# Patient Record
Sex: MALE | Race: WHITE | NOT HISPANIC OR LATINO | URBAN - METROPOLITAN AREA
[De-identification: names, ages, dates, MRNs, and addresses within clinical notes are randomized per-mention and may not be internally consistent; named-entity substitution may affect disease eponyms.]

---

## 2018-11-04 ENCOUNTER — INPATIENT (INPATIENT)
Facility: HOSPITAL | Age: 83
LOS: 0 days | Discharge: HOME | DRG: 312 | End: 2018-11-05
Attending: HOSPITALIST | Admitting: INTERNAL MEDICINE
Payer: COMMERCIAL

## 2018-11-04 VITALS
SYSTOLIC BLOOD PRESSURE: 174 MMHG | RESPIRATION RATE: 18 BRPM | OXYGEN SATURATION: 96 % | TEMPERATURE: 97 F | DIASTOLIC BLOOD PRESSURE: 82 MMHG | WEIGHT: 175.05 LBS | HEART RATE: 67 BPM | HEIGHT: 69 IN

## 2018-11-04 DIAGNOSIS — Z95.1 PRESENCE OF AORTOCORONARY BYPASS GRAFT: Chronic | ICD-10-CM

## 2018-11-04 DIAGNOSIS — R55 SYNCOPE AND COLLAPSE: ICD-10-CM

## 2018-11-04 DIAGNOSIS — E03.9 HYPOTHYROIDISM, UNSPECIFIED: ICD-10-CM

## 2018-11-04 DIAGNOSIS — Z90.89 ACQUIRED ABSENCE OF OTHER ORGANS: Chronic | ICD-10-CM

## 2018-11-04 DIAGNOSIS — E11.9 TYPE 2 DIABETES MELLITUS WITHOUT COMPLICATIONS: ICD-10-CM

## 2018-11-04 DIAGNOSIS — I10 ESSENTIAL (PRIMARY) HYPERTENSION: ICD-10-CM

## 2018-11-04 DIAGNOSIS — I25.10 ATHEROSCLEROTIC HEART DISEASE OF NATIVE CORONARY ARTERY WITHOUT ANGINA PECTORIS: ICD-10-CM

## 2018-11-04 DIAGNOSIS — E78.00 PURE HYPERCHOLESTEROLEMIA, UNSPECIFIED: ICD-10-CM

## 2018-11-04 LAB
ALBUMIN SERPL ELPH-MCNC: 3.6 G/DL — SIGNIFICANT CHANGE UP (ref 3.3–5)
ALP SERPL-CCNC: 81 U/L — SIGNIFICANT CHANGE UP (ref 40–120)
ALT FLD-CCNC: 40 U/L DA — SIGNIFICANT CHANGE UP (ref 10–45)
ANION GAP SERPL CALC-SCNC: 5 MMOL/L — SIGNIFICANT CHANGE UP (ref 5–17)
APTT BLD: 29.8 SEC — SIGNIFICANT CHANGE UP (ref 27.5–36.3)
AST SERPL-CCNC: 32 U/L — SIGNIFICANT CHANGE UP (ref 10–40)
BASOPHILS # BLD AUTO: 0 K/UL — SIGNIFICANT CHANGE UP (ref 0–0.2)
BASOPHILS NFR BLD AUTO: 0.5 % — SIGNIFICANT CHANGE UP (ref 0–2)
BILIRUB SERPL-MCNC: 0.4 MG/DL — SIGNIFICANT CHANGE UP (ref 0.2–1.2)
BUN SERPL-MCNC: 31 MG/DL — HIGH (ref 7–23)
CALCIUM SERPL-MCNC: 9.3 MG/DL — SIGNIFICANT CHANGE UP (ref 8.4–10.5)
CHLORIDE SERPL-SCNC: 106 MMOL/L — SIGNIFICANT CHANGE UP (ref 96–108)
CK MB BLD-MCNC: 2.2 % — SIGNIFICANT CHANGE UP (ref 0–3.5)
CK MB CFR SERPL CALC: 2.5 NG/ML — SIGNIFICANT CHANGE UP (ref 0.5–10)
CK SERPL-CCNC: 115 U/L — SIGNIFICANT CHANGE UP (ref 30–200)
CO2 SERPL-SCNC: 31 MMOL/L — SIGNIFICANT CHANGE UP (ref 22–31)
CREAT SERPL-MCNC: 0.89 MG/DL — SIGNIFICANT CHANGE UP (ref 0.5–1.3)
EOSINOPHIL # BLD AUTO: 0.2 K/UL — SIGNIFICANT CHANGE UP (ref 0–0.5)
EOSINOPHIL NFR BLD AUTO: 2.7 % — SIGNIFICANT CHANGE UP (ref 0–6)
GLUCOSE SERPL-MCNC: 112 MG/DL — HIGH (ref 70–99)
HCT VFR BLD CALC: 49.7 % — SIGNIFICANT CHANGE UP (ref 39–50)
HGB BLD-MCNC: 16.4 G/DL — SIGNIFICANT CHANGE UP (ref 13–17)
INR BLD: 1.13 RATIO — SIGNIFICANT CHANGE UP (ref 0.88–1.16)
LYMPHOCYTES # BLD AUTO: 1.7 K/UL — SIGNIFICANT CHANGE UP (ref 1–3.3)
LYMPHOCYTES # BLD AUTO: 18.5 % — SIGNIFICANT CHANGE UP (ref 13–44)
MCHC RBC-ENTMCNC: 31.6 PG — SIGNIFICANT CHANGE UP (ref 27–34)
MCHC RBC-ENTMCNC: 32.9 GM/DL — SIGNIFICANT CHANGE UP (ref 32–36)
MCV RBC AUTO: 96 FL — SIGNIFICANT CHANGE UP (ref 80–100)
MONOCYTES # BLD AUTO: 0.9 K/UL — SIGNIFICANT CHANGE UP (ref 0–0.9)
MONOCYTES NFR BLD AUTO: 9.5 % — HIGH (ref 1–9)
NEUTROPHILS # BLD AUTO: 6.1 K/UL — SIGNIFICANT CHANGE UP (ref 1.8–7.4)
NEUTROPHILS NFR BLD AUTO: 68.6 % — SIGNIFICANT CHANGE UP (ref 43–77)
PLATELET # BLD AUTO: 128 K/UL — LOW (ref 150–400)
POTASSIUM SERPL-MCNC: 5 MMOL/L — SIGNIFICANT CHANGE UP (ref 3.5–5.3)
POTASSIUM SERPL-SCNC: 5 MMOL/L — SIGNIFICANT CHANGE UP (ref 3.5–5.3)
PROT SERPL-MCNC: 7.2 G/DL — SIGNIFICANT CHANGE UP (ref 6–8.3)
PROTHROM AB SERPL-ACNC: 12.7 SEC — SIGNIFICANT CHANGE UP (ref 10–12.9)
RBC # BLD: 5.18 M/UL — SIGNIFICANT CHANGE UP (ref 4.2–5.8)
RBC # FLD: 12.1 % — SIGNIFICANT CHANGE UP (ref 10.3–14.5)
SODIUM SERPL-SCNC: 142 MMOL/L — SIGNIFICANT CHANGE UP (ref 135–145)
TROPONIN I SERPL-MCNC: 0.02 NG/ML — SIGNIFICANT CHANGE UP (ref 0.02–0.06)
TROPONIN I SERPL-MCNC: 0.03 NG/ML — SIGNIFICANT CHANGE UP (ref 0.02–0.06)
WBC # BLD: 8.9 K/UL — SIGNIFICANT CHANGE UP (ref 3.8–10.5)
WBC # FLD AUTO: 8.9 K/UL — SIGNIFICANT CHANGE UP (ref 3.8–10.5)

## 2018-11-04 PROCEDURE — 71045 X-RAY EXAM CHEST 1 VIEW: CPT | Mod: 26

## 2018-11-04 PROCEDURE — 70450 CT HEAD/BRAIN W/O DYE: CPT | Mod: 26

## 2018-11-04 PROCEDURE — 99223 1ST HOSP IP/OBS HIGH 75: CPT

## 2018-11-04 PROCEDURE — 93010 ELECTROCARDIOGRAM REPORT: CPT

## 2018-11-04 PROCEDURE — 71275 CT ANGIOGRAPHY CHEST: CPT | Mod: 26

## 2018-11-04 PROCEDURE — 99285 EMERGENCY DEPT VISIT HI MDM: CPT

## 2018-11-04 PROCEDURE — 72125 CT NECK SPINE W/O DYE: CPT | Mod: 26

## 2018-11-04 RX ORDER — ACETAMINOPHEN 500 MG
650 TABLET ORAL ONCE
Qty: 0 | Refills: 0 | Status: COMPLETED | OUTPATIENT
Start: 2018-11-04 | End: 2018-11-04

## 2018-11-04 RX ORDER — ENOXAPARIN SODIUM 100 MG/ML
40 INJECTION SUBCUTANEOUS EVERY 24 HOURS
Qty: 0 | Refills: 0 | Status: DISCONTINUED | OUTPATIENT
Start: 2018-11-04 | End: 2018-11-05

## 2018-11-04 RX ORDER — METOPROLOL TARTRATE 50 MG
25 TABLET ORAL DAILY
Qty: 0 | Refills: 0 | Status: DISCONTINUED | OUTPATIENT
Start: 2018-11-04 | End: 2018-11-05

## 2018-11-04 RX ORDER — ASPIRIN/CALCIUM CARB/MAGNESIUM 324 MG
81 TABLET ORAL DAILY
Qty: 0 | Refills: 0 | Status: DISCONTINUED | OUTPATIENT
Start: 2018-11-04 | End: 2018-11-05

## 2018-11-04 RX ORDER — METOPROLOL TARTRATE 50 MG
1 TABLET ORAL
Qty: 0 | Refills: 0 | COMMUNITY

## 2018-11-04 RX ORDER — LEVOTHYROXINE SODIUM 125 MCG
100 TABLET ORAL DAILY
Qty: 0 | Refills: 0 | Status: DISCONTINUED | OUTPATIENT
Start: 2018-11-04 | End: 2018-11-05

## 2018-11-04 RX ORDER — ATORVASTATIN CALCIUM 80 MG/1
10 TABLET, FILM COATED ORAL AT BEDTIME
Qty: 0 | Refills: 0 | Status: DISCONTINUED | OUTPATIENT
Start: 2018-11-04 | End: 2018-11-05

## 2018-11-04 RX ORDER — SIMVASTATIN 20 MG/1
20 TABLET, FILM COATED ORAL AT BEDTIME
Qty: 0 | Refills: 0 | Status: DISCONTINUED | OUTPATIENT
Start: 2018-11-04 | End: 2018-11-04

## 2018-11-04 RX ADMIN — Medication 650 MILLIGRAM(S): at 21:48

## 2018-11-04 RX ADMIN — Medication 650 MILLIGRAM(S): at 20:48

## 2018-11-04 NOTE — H&P ADULT - HISTORY OF PRESENT ILLNESS
83M hx of CAD, s/p CABG x5 (2006), HTN, HLD, hypothyroid, borderline DM (not req meds), diffuse OA (neck, hands, hips, knees) pw s/p syncope and collapse. Pt recalled that he was outside trying to go up steps but was short before the steps and turned into a bush and the next thing he knew he was being transported to the ambulance. He denied any antecedent HA, SOB, palps, CP, diaphoresis, nausea. Syncope was unwitnessed. Wife heard him fall and found him on the ground unconscious and came to in several minutes and incoherent initially. Currently, he has some back pain s/p fall. Has chronic neck pain from OA. No recent fevers, chills, URI sxs, NVD or dysuria.

## 2018-11-04 NOTE — H&P ADULT - PROBLEM SELECTOR PLAN 1
abnormal CXR r/o PNA/PE  tele monitoring to assess for arrthymias, bradycardia  trend trops, ECHO, card  CThead/neck: prelim report neg for CVA/ICH or fx  check UA to exclude occult UTI

## 2018-11-04 NOTE — H&P ADULT - NSHPPHYSICALEXAM_GEN_ALL_CORE
Vital Signs Last 24 Hrs  T(C): 36.2 (04 Nov 2018 18:20), Max: 36.2 (04 Nov 2018 18:20)  T(F): 97.1 (04 Nov 2018 18:20), Max: 97.1 (04 Nov 2018 18:20)  HR: 59 (04 Nov 2018 21:23) (59 - 67)  BP: 123/53 (04 Nov 2018 21:23) (123/53 - 174/82)  BP(mean): --  RR: 18 (04 Nov 2018 21:23) (18 - 18)  SpO2: 97% (04 Nov 2018 21:23) (96% - 97%)  Daily Height in cm: 175.26 (04 Nov 2018 18:20)    Daily   CAPILLARY BLOOD GLUCOSE        I&O's Summary      GENERAL: NAD  HEAD:  Normocephalic  EYES: EOMI, PERRLA, conjunctiva and sclera clear  ENMT: No tonsillar erythema, exudates, or enlargement; Moist mucous membranes, No lesions  NECK: Supple, No JVD, no bruit, normal thyroid  NERVOUS SYSTEM:  Alert & Oriented X3, Good concentration; grossly  Motor Strength 5/5 B/L upper and lower extremities; DTRs 2+ intact and symmetric  CHEST/LUNG: Clear to auscultation bilaterally; No rales, rhonchi, wheezing, or rubs  HEART: Regular rate and rhythm; No murmurs, rubs, or gallops  ABDOMEN: Soft, Nontender, Nondistended; Bowel sounds present  EXTREMITIES:  2+ Peripheral Pulses, No clubbing, cyanosis, or edema  LYMPH: No lymphadenopathy noted  SKIN: No rashes or lesions

## 2018-11-04 NOTE — PATIENT PROFILE ADULT - NSPROGENPREVTRANSF_GEN_A_NUR
no
Spine appears normal - no midline c/t/ls tenderness, range of motion is not limited, no muscle or joint tenderness

## 2018-11-04 NOTE — H&P ADULT - ASSESSMENT
83M hx of CAD, s/p CABG x5 (2006), HTN, HLD, hypothyroid, borderline DM (not req meds), diffuse OA (neck, hands, hips, knees) pw s/p syncope and collapse.

## 2018-11-04 NOTE — H&P ADULT - PMH
Coronary artery disease involving native coronary artery of native heart, angina presence unspecified    High cholesterol    Hypothyroid

## 2018-11-04 NOTE — H&P ADULT - NSHPLABSRESULTS_GEN_ALL_CORE
16.4   8.9   )-----------( 128      ( 04 Nov 2018 19:50 )             49.7       11-04    142  |  106  |  31<H>  ----------------------------<  112<H>  5.0   |  31  |  0.89    Ca    9.3      04 Nov 2018 19:50    TPro  7.2  /  Alb  3.6  /  TBili  0.4  /  DBili  x   /  AST  32  /  ALT  40  /  AlkPhos  81  11-04         LIVER FUNCTIONS - ( 04 Nov 2018 19:50 )  Alb: 3.6 g/dL / Pro: 7.2 g/dL / ALK PHOS: 81 U/L / ALT: 40 U/L DA / AST: 32 U/L / GGT: x               PT/INR - ( 04 Nov 2018 19:50 )   PT: 12.7 sec;   INR: 1.13 ratio         PTT - ( 04 Nov 2018 19:50 )  PTT:29.8 sec    CARDIAC MARKERS ( 04 Nov 2018 19:50 )  .021 ng/mL / x     / 115 U/L / x     / 2.5 ng/mL            CAPILLARY BLOOD GLUCOSE            EKG: sinus rochelle at 53bpm, LAD, no acute st changes.       CXR: increased density on the LLL    CT Cervical spine: PRELIM report    IMPRESSION:   1. No acute fracture.   2. Multilevel degenerative changes as detailed above.     CT head: PRELIM report    IMPRESSION:   No evidence of acute intracranial hemorrhage, or edema.     CT angio of the chest: RESULTS PENDING

## 2018-11-04 NOTE — ED PROVIDER NOTE - OBJECTIVE STATEMENT
84 y/o M with pmhx of hypothyroid, high cholesterol, CAD, presenting to the ED BIBEMS with c/o falling down this evening with + LOC. Pt reports he was walking up his outside stairs when he felt he miss stepped causing him to fall. He does not remember falling and does not remember hitting the ground. He was found by his wife outside on his back awake but incoherent. En route to the hospital pt started regaining his memory however still does not remember falling. He admits to 3 out of 10 pain to the back on his head and his left scapula. He denies any further injury or pain. He denies feeling faint prior to falling, denies CP, SOB, ABD pain, weakness.

## 2018-11-04 NOTE — ED ADULT NURSE NOTE - OBJECTIVE STATEMENT
pt presents to ED s/p fall. reports tripped and fell hitting back of head. reports +loc.  abrasion  noted. pt alert and oriented, however takes several moments to recall. denies n/v, cp, sob. PA at bedside. no further complaints.

## 2018-11-04 NOTE — H&P ADULT - FAMILY HISTORY
Mother  Still living? Unknown  Family history of cerebrovascular accident (CVA), Age at diagnosis: Age Unknown     Sibling  Still living? Unknown  Family history of diabetes mellitus, Age at diagnosis: Age Unknown

## 2018-11-04 NOTE — H&P ADULT - NSHPREVIEWOFSYSTEMS_GEN_ALL_CORE
CONSTITUTIONAL: No fever, weight loss, or fatigue  EYES: No eye pain, visual disturbances, or discharge  ENMT:  No difficulty hearing, tinnitus, vertigo; No sinus or throat pain  NECK: No pain or stiffness  RESPIRATORY: No cough, wheezing, chills or hemoptysis; No shortness of breath  CARDIOVASCULAR: No chest pain, palpitations, dizziness, or leg swelling  GASTROINTESTINAL: No abdominal or epigastric pain. No nausea, vomiting, or hematemesis; No diarrhea or constipation. No melena or hematochezia.  GENITOURINARY: No dysuria, frequency, hematuria, or incontinence  NEUROLOGICAL: No headaches, memory loss, loss of strength, numbness, or tremors  SKIN: No itching, burning, rashes, or lesions   LYMPH NODES: No enlarged glands  ENDOCRINE: No heat or cold intolerance; No hair loss  MUSCULOSKELETAL: No joint pain or swelling; No muscle, back, or extremity pain  PSYCHIATRIC: No depression, anxiety, mood swings, or difficulty sleeping  HEME/LYMPH: No easy bruising, or bleeding gums  ALLERY AND IMMUNOLOGIC: No hives or eczema    IMPROVE VTE Individual Risk Assessment          RISK                                                          Points  [  ] Previous VTE                                                3  [  ] Thrombophilia                                             2  [  ] Lower limb paralysis                                   2        (unable to hold up >15 seconds)    [  ] Current Cancer                                             2         (within 6 months)  [  ] Immobilization > 24 hrs                              1  [  ] ICU/CCU stay > 24 hours                             1  [1  ] Age > 60                                                         1    IMPROVE VTE Score:         [     1    ]    Total Risk Factor Score:    0 - 1:   Consider IPC  >2 - 3:  Thromboprophylaxis required (enoxaparin or SQ heparin)        >4:   High Risk: Thromboprophylaxis required (enoxaparin or SQ heparin), optional add IPC  **If CONTRAINDICATION to enoxaparin or SQ heparin, USE IPCs**

## 2018-11-05 ENCOUNTER — TRANSCRIPTION ENCOUNTER (OUTPATIENT)
Age: 83
End: 2018-11-05

## 2018-11-05 VITALS
RESPIRATION RATE: 15 BRPM | SYSTOLIC BLOOD PRESSURE: 131 MMHG | TEMPERATURE: 99 F | HEART RATE: 60 BPM | DIASTOLIC BLOOD PRESSURE: 61 MMHG | OXYGEN SATURATION: 96 %

## 2018-11-05 LAB
ALBUMIN SERPL ELPH-MCNC: 3.1 G/DL — LOW (ref 3.3–5)
ALP SERPL-CCNC: 72 U/L — SIGNIFICANT CHANGE UP (ref 40–120)
ALT FLD-CCNC: 32 U/L DA — SIGNIFICANT CHANGE UP (ref 10–45)
ANION GAP SERPL CALC-SCNC: 8 MMOL/L — SIGNIFICANT CHANGE UP (ref 5–17)
APPEARANCE UR: CLEAR — SIGNIFICANT CHANGE UP
AST SERPL-CCNC: 26 U/L — SIGNIFICANT CHANGE UP (ref 10–40)
BACTERIA # UR AUTO: ABNORMAL /HPF
BILIRUB SERPL-MCNC: 0.7 MG/DL — SIGNIFICANT CHANGE UP (ref 0.2–1.2)
BILIRUB UR-MCNC: NEGATIVE — SIGNIFICANT CHANGE UP
BUN SERPL-MCNC: 22 MG/DL — SIGNIFICANT CHANGE UP (ref 7–23)
CALCIUM SERPL-MCNC: 8.9 MG/DL — SIGNIFICANT CHANGE UP (ref 8.4–10.5)
CHLORIDE SERPL-SCNC: 106 MMOL/L — SIGNIFICANT CHANGE UP (ref 96–108)
CHOLEST SERPL-MCNC: 133 MG/DL — SIGNIFICANT CHANGE UP (ref 10–199)
CO2 SERPL-SCNC: 27 MMOL/L — SIGNIFICANT CHANGE UP (ref 22–31)
COLOR SPEC: YELLOW — SIGNIFICANT CHANGE UP
CREAT SERPL-MCNC: 0.78 MG/DL — SIGNIFICANT CHANGE UP (ref 0.5–1.3)
DIFF PNL FLD: NEGATIVE — SIGNIFICANT CHANGE UP
EPI CELLS # UR: SIGNIFICANT CHANGE UP
GLUCOSE SERPL-MCNC: 93 MG/DL — SIGNIFICANT CHANGE UP (ref 70–99)
GLUCOSE UR QL: NEGATIVE — SIGNIFICANT CHANGE UP
HBA1C BLD-MCNC: 5.9 % — HIGH (ref 4–5.6)
HCT VFR BLD CALC: 46.2 % — SIGNIFICANT CHANGE UP (ref 39–50)
HDLC SERPL-MCNC: 36 MG/DL — LOW
HGB BLD-MCNC: 15 G/DL — SIGNIFICANT CHANGE UP (ref 13–17)
KETONES UR-MCNC: ABNORMAL
LEUKOCYTE ESTERASE UR-ACNC: NEGATIVE — SIGNIFICANT CHANGE UP
LIPID PNL WITH DIRECT LDL SERPL: 77 MG/DL — SIGNIFICANT CHANGE UP
MAGNESIUM SERPL-MCNC: 2 MG/DL — SIGNIFICANT CHANGE UP (ref 1.6–2.6)
MCHC RBC-ENTMCNC: 30.7 PG — SIGNIFICANT CHANGE UP (ref 27–34)
MCHC RBC-ENTMCNC: 32.4 GM/DL — SIGNIFICANT CHANGE UP (ref 32–36)
MCV RBC AUTO: 94.9 FL — SIGNIFICANT CHANGE UP (ref 80–100)
NITRITE UR-MCNC: NEGATIVE — SIGNIFICANT CHANGE UP
PH UR: 6 — SIGNIFICANT CHANGE UP (ref 5–8)
PLATELET # BLD AUTO: 113 K/UL — LOW (ref 150–400)
POTASSIUM SERPL-MCNC: 4 MMOL/L — SIGNIFICANT CHANGE UP (ref 3.5–5.3)
POTASSIUM SERPL-SCNC: 4 MMOL/L — SIGNIFICANT CHANGE UP (ref 3.5–5.3)
PROT SERPL-MCNC: 6.2 G/DL — SIGNIFICANT CHANGE UP (ref 6–8.3)
PROT UR-MCNC: 30 MG/DL
RBC # BLD: 4.87 M/UL — SIGNIFICANT CHANGE UP (ref 4.2–5.8)
RBC # FLD: 11.8 % — SIGNIFICANT CHANGE UP (ref 10.3–14.5)
RBC CASTS # UR COMP ASSIST: SIGNIFICANT CHANGE UP /HPF (ref 0–4)
SODIUM SERPL-SCNC: 141 MMOL/L — SIGNIFICANT CHANGE UP (ref 135–145)
SP GR SPEC: 1.01 — SIGNIFICANT CHANGE UP (ref 1.01–1.02)
TOTAL CHOLESTEROL/HDL RATIO MEASUREMENT: 3.7 RATIO — SIGNIFICANT CHANGE UP (ref 3.4–9.6)
TRIGL SERPL-MCNC: 102 MG/DL — SIGNIFICANT CHANGE UP (ref 10–149)
TROPONIN I SERPL-MCNC: <.017 NG/ML — LOW (ref 0.02–0.06)
TSH SERPL-MCNC: 1.45 UIU/ML — SIGNIFICANT CHANGE UP (ref 0.27–4.2)
UROBILINOGEN FLD QL: NEGATIVE — SIGNIFICANT CHANGE UP
WBC # BLD: 6.7 K/UL — SIGNIFICANT CHANGE UP (ref 3.8–10.5)
WBC # FLD AUTO: 6.7 K/UL — SIGNIFICANT CHANGE UP (ref 3.8–10.5)
WBC UR QL: SIGNIFICANT CHANGE UP /HPF (ref 0–5)

## 2018-11-05 PROCEDURE — 93306 TTE W/DOPPLER COMPLETE: CPT | Mod: 26

## 2018-11-05 PROCEDURE — 93306 TTE W/DOPPLER COMPLETE: CPT

## 2018-11-05 PROCEDURE — 72125 CT NECK SPINE W/O DYE: CPT

## 2018-11-05 PROCEDURE — 81001 URINALYSIS AUTO W/SCOPE: CPT

## 2018-11-05 PROCEDURE — 83036 HEMOGLOBIN GLYCOSYLATED A1C: CPT

## 2018-11-05 PROCEDURE — 99239 HOSP IP/OBS DSCHRG MGMT >30: CPT

## 2018-11-05 PROCEDURE — 99222 1ST HOSP IP/OBS MODERATE 55: CPT

## 2018-11-05 PROCEDURE — 84484 ASSAY OF TROPONIN QUANT: CPT

## 2018-11-05 PROCEDURE — 36000 PLACE NEEDLE IN VEIN: CPT

## 2018-11-05 PROCEDURE — 80061 LIPID PANEL: CPT

## 2018-11-05 PROCEDURE — 70450 CT HEAD/BRAIN W/O DYE: CPT

## 2018-11-05 PROCEDURE — 80053 COMPREHEN METABOLIC PANEL: CPT

## 2018-11-05 PROCEDURE — 71275 CT ANGIOGRAPHY CHEST: CPT

## 2018-11-05 PROCEDURE — 82550 ASSAY OF CK (CPK): CPT

## 2018-11-05 PROCEDURE — 84443 ASSAY THYROID STIM HORMONE: CPT

## 2018-11-05 PROCEDURE — 85730 THROMBOPLASTIN TIME PARTIAL: CPT

## 2018-11-05 PROCEDURE — 71045 X-RAY EXAM CHEST 1 VIEW: CPT

## 2018-11-05 PROCEDURE — 85027 COMPLETE CBC AUTOMATED: CPT

## 2018-11-05 PROCEDURE — 93005 ELECTROCARDIOGRAM TRACING: CPT

## 2018-11-05 PROCEDURE — 83735 ASSAY OF MAGNESIUM: CPT

## 2018-11-05 PROCEDURE — 93010 ELECTROCARDIOGRAM REPORT: CPT

## 2018-11-05 PROCEDURE — 82553 CREATINE MB FRACTION: CPT

## 2018-11-05 PROCEDURE — 99285 EMERGENCY DEPT VISIT HI MDM: CPT | Mod: 25

## 2018-11-05 PROCEDURE — 85610 PROTHROMBIN TIME: CPT

## 2018-11-05 RX ORDER — ACETAMINOPHEN 500 MG
650 TABLET ORAL EVERY 6 HOURS
Qty: 0 | Refills: 0 | Status: DISCONTINUED | OUTPATIENT
Start: 2018-11-05 | End: 2018-11-05

## 2018-11-05 RX ADMIN — Medication 100 MICROGRAM(S): at 05:28

## 2018-11-05 RX ADMIN — Medication 650 MILLIGRAM(S): at 15:06

## 2018-11-05 RX ADMIN — Medication 650 MILLIGRAM(S): at 05:09

## 2018-11-05 RX ADMIN — Medication 25 MILLIGRAM(S): at 05:28

## 2018-11-05 RX ADMIN — Medication 650 MILLIGRAM(S): at 16:47

## 2018-11-05 RX ADMIN — Medication 650 MILLIGRAM(S): at 04:09

## 2018-11-05 RX ADMIN — Medication 81 MILLIGRAM(S): at 12:41

## 2018-11-05 NOTE — PROGRESS NOTE ADULT - PROBLEM SELECTOR PROBLEM 2
Type 2 diabetes mellitus without complication, without long-term current use of insulin Coronary artery disease involving native coronary artery of native heart, angina presence unspecified

## 2018-11-05 NOTE — CONSULT NOTE ADULT - ASSESSMENT
In summary the patient is an 83 yr old man with cad. it is unclear whether he syncopized or simply tripped and fell.    No evidence of ACS and no arrythmia so far

## 2018-11-05 NOTE — DISCHARGE NOTE ADULT - CARE PROVIDER_API CALL
Jani Major), Cardiovascular Disease; Internal Medicine  [address] [place] [address]  Phone: [phone]  Fax: [phone]

## 2018-11-05 NOTE — PROGRESS NOTE ADULT - SUBJECTIVE AND OBJECTIVE BOX
Patient is a 83y old  Male who presents with a chief complaint of syncope (2018 23:07)      Patient seen and examined at bedside.    ALLERGIES:  No Known Allergies    MEDICATIONS  (STANDING):  aspirin enteric coated 81 milliGRAM(s) Oral daily  atorvastatin 10 milliGRAM(s) Oral at bedtime  enoxaparin Injectable 40 milliGRAM(s) SubCutaneous every 24 hours  levothyroxine 100 MICROGram(s) Oral daily  metoprolol succinate ER 25 milliGRAM(s) Oral daily    MEDICATIONS  (PRN):  acetaminophen   Tablet .. 650 milliGRAM(s) Oral every 6 hours PRN Temp greater or equal to 38C (100.4F), Mild Pain (1 - 3)    Vital Signs Last 24 Hrs  T(F): 98.4 (2018 10:28), Max: 98.4 (2018 10:28)  HR: 62 (2018 10:28) (59 - 74)  BP: 113/55 (2018 10:30) (110/46 - 174/82)  RR: 16 (2018 10:28) (15 - 18)  SpO2: 97% (2018 10:28) (94% - 98%)  I&O's Summary    2018 07:01  -  2018 11:23  --------------------------------------------------------  IN: 200 mL / OUT: 1 mL / NET: 199 mL      PHYSICAL EXAM:  General: NAD, A/O x 3  ENT: MMM  Neck: Supple, No JVD  Lungs: Clear to auscultation bilaterally  Cardio: RRR, S1/S2, No murmurs  Abdomen: Soft, Nontender, Nondistended; Bowel sounds present  Extremities: No calf tenderness, No pitting edema    LABS:                        15.0   6.7   )-----------( 113      ( 2018 07:28 )             46.2     11-05    141  |  106  |  22  ----------------------------<  93  4.0   |  27  |  0.78    Ca    8.9      2018 07:28  Mg     2.0     11-05    TPro  6.2  /  Alb  3.1  /  TBili  0.7  /  DBili  x   /  AST  26  /  ALT  32  /  AlkPhos  72      eGFR if Non African American: 84 mL/min/1.73M2 (18 @ 07:28)  eGFR if African American: 97 mL/min/1.73M2 (18 @ 07:28)    PT/INR - ( 2018 19:50 )   PT: 12.7 sec;   INR: 1.13 ratio         PTT - ( 2018 19:50 )  PTT:29.8 sec    CARDIAC MARKERS ( 2018 07:28 )  <.017 ng/mL / x     / x     / x     / x      CARDIAC MARKERS ( 2018 23:00 )  .030 ng/mL / x     / x     / x     / x      CARDIAC MARKERS ( 2018 19:50 )  .021 ng/mL / x     / 115 U/L / x     / 2.5 ng/mL    CAPILLARY BLOOD GLUCOSE          Urinalysis Basic - ( 2018 01:50 )    Color: Yellow / Appearance: Clear / S.010 / pH: x  Gluc: x / Ketone: Trace  / Bili: Negative / Urobili: Negative   Blood: x / Protein: 30 mg/dL / Nitrite: Negative   Leuk Esterase: Negative / RBC: 0-4 /HPF / WBC 0-2 /HPF   Sq Epi: x / Non Sq Epi: Neg.-Few / Bacteria: Trace /HPF          RADIOLOGY & ADDITIONAL TESTS:    Care Discussed with Consultants/Other Providers: Patient is a 83y old  Male who presents with a chief complaint of syncope (2018 23:07)  Patient seen and examined at bedside.  Pt. c/o headache and back pain.    ALLERGIES:  No Known Allergies    MEDICATIONS  (STANDING):  aspirin enteric coated 81 milliGRAM(s) Oral daily  atorvastatin 10 milliGRAM(s) Oral at bedtime  enoxaparin Injectable 40 milliGRAM(s) SubCutaneous every 24 hours  levothyroxine 100 MICROGram(s) Oral daily  metoprolol succinate ER 25 milliGRAM(s) Oral daily    MEDICATIONS  (PRN):  acetaminophen   Tablet .. 650 milliGRAM(s) Oral every 6 hours PRN Temp greater or equal to 38C (100.4F), Mild Pain (1 - 3)    Vital Signs Last 24 Hrs  T(F): 98.4 (2018 10:28), Max: 98.4 (2018 10:28)  HR: 62 (2018 10:28) (59 - 74)  BP: 113/55 (2018 10:30) (110/46 - 174/82)  RR: 16 (2018 10:28) (15 - 18)  SpO2: 97% (2018 10:28) (94% - 98%)  I&O's Summary    2018 07:01  -  2018 11:23  --------------------------------------------------------  IN: 200 mL / OUT: 1 mL / NET: 199 mL      PHYSICAL EXAM:  General: NAD, A/O x 3  HEENT:  +abrasion at back of head, no bleeding  Neck: Supple, No JVD  Lungs: Clear to auscultation bilaterally  Cardio: RRR, S1/S2, No murmurs  Abdomen: Soft, Nontender, Nondistended; Bowel sounds present  Extremities: No calf tenderness, No pitting edema  Neuro:  non focal, no gross deficits    LABS:                        15.0   6.7   )-----------( 113      ( 2018 07:28 )             46.2     11-05    141  |  106  |  22  ----------------------------<  93  4.0   |  27  |  0.78    Ca    8.9      2018 07:28  Mg     2.0         TPro  6.2  /  Alb  3.1  /  TBili  0.7  /  DBili  x   /  AST  26  /  ALT  32  /  AlkPhos  72      eGFR if Non African American: 84 mL/min/1.73M2 (18 @ 07:28)  eGFR if African American: 97 mL/min/1.73M2 (18 @ 07:28)    PT/INR - ( 2018 19:50 )   PT: 12.7 sec;   INR: 1.13 ratio         PTT - ( 2018 19:50 )  PTT:29.8 sec    CARDIAC MARKERS ( 2018 07:28 )  <.017 ng/mL / x     / x     / x     / x      CARDIAC MARKERS ( 2018 23:00 )  .030 ng/mL / x     / x     / x     / x      CARDIAC MARKERS ( 2018 19:50 )  .021 ng/mL / x     / 115 U/L / x     / 2.5 ng/mL    CAPILLARY BLOOD GLUCOSE          Urinalysis Basic - ( 2018 01:50 )    Color: Yellow / Appearance: Clear / S.010 / pH: x  Gluc: x / Ketone: Trace  / Bili: Negative / Urobili: Negative   Blood: x / Protein: 30 mg/dL / Nitrite: Negative   Leuk Esterase: Negative / RBC: 0-4 /HPF / WBC 0-2 /HPF   Sq Epi: x / Non Sq Epi: Neg.-Few / Bacteria: Trace /HPF          RADIOLOGY & ADDITIONAL TESTS:    Care Discussed with Consultants/Other Providers: Patient is a 83y old  Male who presents with a chief complaint of syncope (2018 23:07)  Patient seen and examined at bedside.  Pt. c/o headache and back pain.    ALLERGIES:  No Known Allergies    MEDICATIONS  (STANDING):  aspirin enteric coated 81 milliGRAM(s) Oral daily  atorvastatin 10 milliGRAM(s) Oral at bedtime  enoxaparin Injectable 40 milliGRAM(s) SubCutaneous every 24 hours  levothyroxine 100 MICROGram(s) Oral daily  metoprolol succinate ER 25 milliGRAM(s) Oral daily    MEDICATIONS  (PRN):  acetaminophen   Tablet .. 650 milliGRAM(s) Oral every 6 hours PRN Temp greater or equal to 38C (100.4F), Mild Pain (1 - 3)    Vital Signs Last 24 Hrs  T(F): 98.4 (2018 10:28), Max: 98.4 (2018 10:28)  HR: 62 (2018 10:28) (59 - 74)  BP: 113/55 (2018 10:30) (110/46 - 174/82)  RR: 16 (2018 10:28) (15 - 18)  SpO2: 97% (2018 10:28) (94% - 98%)  I&O's Summary    2018 07:01  -  2018 11:23  --------------------------------------------------------  IN: 200 mL / OUT: 1 mL / NET: 199 mL      PHYSICAL EXAM:  General: NAD, A/O x 3  HEENT:  +abrasion at back of head, no bleeding  Neck: Supple, No JVD  Lungs: Clear to auscultation bilaterally  Cardio: RRR, S1/S2, No murmurs  Abdomen: Soft, Nontender, Nondistended; Bowel sounds present  Extremities: No calf tenderness, No pitting edema  Neuro:  non focal, no gross deficits    LABS:                        15.0   6.7   )-----------( 113      ( 2018 07:28 )             46.2     11-05    141  |  106  |  22  ----------------------------<  93  4.0   |  27  |  0.78    Ca    8.9      2018 07:28  Mg     2.0         TPro  6.2  /  Alb  3.1  /  TBili  0.7  /  DBili  x   /  AST  26  /  ALT  32  /  AlkPhos  72      eGFR if Non African American: 84 mL/min/1.73M2 (18 @ 07:28)  eGFR if African American: 97 mL/min/1.73M2 (18 @ 07:28)    PT/INR - ( 2018 19:50 )   PT: 12.7 sec;   INR: 1.13 ratio         PTT - ( 2018 19:50 )  PTT:29.8 sec    CARDIAC MARKERS ( 2018 07:28 )  <.017 ng/mL / x     / x     / x     / x      CARDIAC MARKERS ( 2018 23:00 )  .030 ng/mL / x     / x     / x     / x      CARDIAC MARKERS ( 2018 19:50 )  .021 ng/mL / x     / 115 U/L / x     / 2.5 ng/mL    CAPILLARY BLOOD GLUCOSE          Urinalysis Basic - ( 2018 01:50 )    Color: Yellow / Appearance: Clear / S.010 / pH: x  Gluc: x / Ketone: Trace  / Bili: Negative / Urobili: Negative   Blood: x / Protein: 30 mg/dL / Nitrite: Negative   Leuk Esterase: Negative / RBC: 0-4 /HPF / WBC 0-2 /HPF   Sq Epi: x / Non Sq Epi: Neg.-Few / Bacteria: Trace /HPF          RADIOLOGY & ADDITIONAL TESTS:< from: CT Angio Chest w/ IV Cont (18 @ 22:07) >  There has been median sternotomy. There is mild to moderate cardiomegaly.   The aorta is normal in caliber and enhances uniformly. There is no   pulmonary embolism. There is no pleural or pericardial effusion, lobar   consolidation, central edema or layering effusion. There is a small   hiatal hernia. There is mild linear atelectasis or scarring in the left   lung base.    < end of copied text >  < from: 12 Lead ECG (18 @ 19:34) >    Diagnosis Line Sinus bradycardia  Left axis deviation  Minimal voltage criteria for LVH, may be normal variant    < end of copied text >    Echo- PENDING    Care Discussed with Consultants/Other Providers: Dr. Manuel

## 2018-11-05 NOTE — PROGRESS NOTE ADULT - PROBLEM SELECTOR PROBLEM 3
Coronary artery disease involving native coronary artery of native heart, angina presence unspecified Essential hypertension

## 2018-11-05 NOTE — PROGRESS NOTE ADULT - PROBLEM SELECTOR PLAN 1
no arrythmias on the moniter, trops were negative x 4  Cardiology consulted, check Echo results no arrythmias on the moniter, trops were negative x 3  Cardiology consulted, check Echo results

## 2018-11-05 NOTE — PROGRESS NOTE ADULT - ATTENDING COMMENTS
Patient admitted with syncopal episode while in a dark room. High risk cardiac patient, however, no current indication of cardiac etiology at this time. Seen by cardiology. Pending echocardiogram official read. May be able to leave later today.    I have personally seen and examined patient on the above date.  I discussed the case with LOBO Chinchilla and I agree with findings and plan as detailed per note above, which I have amended where appropriate.

## 2018-11-05 NOTE — DISCHARGE NOTE ADULT - CARE PLAN
Principal Discharge DX:	Syncope and collapse  Goal:	to remain symptom free  Assessment and plan of treatment:	to continue current home medications, f/u with your PMD within 1 week  Secondary Diagnosis:	Coronary artery disease involving native coronary artery of native heart, angina presence unspecified  Secondary Diagnosis:	Essential hypertension  Secondary Diagnosis:	Acquired hypothyroidism

## 2018-11-05 NOTE — DISCHARGE NOTE ADULT - SECONDARY DIAGNOSIS.
Coronary artery disease involving native coronary artery of native heart, angina presence unspecified Essential hypertension Acquired hypothyroidism

## 2018-11-05 NOTE — DISCHARGE NOTE ADULT - MEDICATION SUMMARY - MEDICATIONS TO TAKE
I will START or STAY ON the medications listed below when I get home from the hospital:    Arthrotec 75 mg-200 mcg oral tablet  -- Indication: For osteoarthritis    Aspir 81 oral delayed release tablet  -- Indication: For Coronary artery disease involving native coronary artery of native heart, angina presence unspecified    simvastatin 20 mg oral tablet  -- Indication: For Coronary artery disease involving native coronary artery of native heart, angina presence unspecified    Toprol-XL 25 mg oral tablet, extended release  -- 1 tab(s) by mouth once a day  -- Indication: For Coronary artery disease involving native coronary artery of native heart, angina presence unspecified    Synthroid 100 mcg (0.1 mg) oral tablet  -- 1 tab(s) by mouth once a day  -- Indication: For Hypothyroid

## 2018-11-05 NOTE — CONSULT NOTE ADULT - SUBJECTIVE AND OBJECTIVE BOX
Chief Complaint: fall    HPI: This is an 83 yr old man with a hx of cad s/p cabg many yrs ago for angina pectoris presents after a fall. Patient was walking in the dark and fell. He does not remember falling, he doesnt remmber being dizzy. This has never happened before.    PMH:   Coronary artery disease involving native coronary artery of native heart, angina presence unspecified  Hypothyroid  High cholesterol    PSH:   S/P tonsillectomy  S/P CABG x 5  No significant past surgical history    Family History:  FAMILY HISTORY:  Family history of diabetes mellitus (Sibling)  Family history of cerebrovascular accident (CVA) (Mother)      Social History:  Smoking:no   Alcohol:rare  Drugs:no    Allergies:  No Known Allergies      Medications:  acetaminophen   Tablet .. 650 milliGRAM(s) Oral every 6 hours PRN  aspirin enteric coated 81 milliGRAM(s) Oral daily  atorvastatin 10 milliGRAM(s) Oral at bedtime  enoxaparin Injectable 40 milliGRAM(s) SubCutaneous every 24 hours  levothyroxine 100 MICROGram(s) Oral daily  metoprolol succinate ER 25 milliGRAM(s) Oral daily      REVIEW OF SYSTEMS:  CONSTITUTIONAL: No fever, weight loss, or fatigue  EYES: No eye pain, visual disturbances, or discharge  ENMT:  No difficulty hearing, tinnitus, vertigo; No sinus or throat pain  NECK: No pain or stiffness  BREASTS: No pain, masses, or nipple discharge  RESPIRATORY: No cough, wheezing, chills or hemoptysis; No shortness of breath  CARDIOVASCULAR: No chest pain, palpitations, dizziness, or leg swelling  GASTROINTESTINAL: No abdominal or epigastric pain. No nausea, vomiting, or hematemesis; No diarrhea or constipation. No melena or hematochezia.  GENITOURINARY: No dysuria, frequency, hematuria, or incontinence  NEUROLOGICAL: No headaches, memory loss, loss of strength, numbness, or tremors  SKIN: No itching, burning, rashes, or lesions   LYMPH NODES: No enlarged glands  ENDOCRINE: No heat or cold intolerance; No hair loss  MUSCULOSKELETAL: No joint pain or swelling; No muscle, back, or extremity pain  PSYCHIATRIC: No depression, anxiety, mood swings, or difficulty sleeping  HEME/LYMPH: No easy bruising, or bleeding gums  ALLERY AND IMMUNOLOGIC: No hives or eczema    Physical Exam:  T(C): 36.9 (11-05-18 @ 10:28), Max: 36.9 (11-05-18 @ 10:28)  HR: 62 (11-05-18 @ 10:28) (59 - 74)  BP: 113/55 (11-05-18 @ 10:30) (110/46 - 174/82)  RR: 16 (11-05-18 @ 10:28) (15 - 18)  SpO2: 97% (11-05-18 @ 10:28) (94% - 98%)  Wt(kg): --    GENERAL: NAD, well-groomed, well-developed  HEAD:  Atraumatic, Normocephalic  EYES: EOMI, conjunctiva and sclera clear  ENT: Moist mucous membranes,  NECK: Supple, No JVD, no bruits  CHEST/LUNG: Clear to percussion bilaterally; No rales, rhonchi, wheezing, or rubs  HEART: Regular rate and rhythm; No murmurs, rubs, or gallops PMI non displaced.  ABDOMEN: Soft, Nontender, Nondistended; Bowel sounds present  EXTREMITIES:  2+ Peripheral Pulses, No clubbing, cyanosis, or edema  SKIN: No rashes or lesions  NERVOUS SYSTEM:  Alert & Oriented X3, Good concentration; Motor Strength 5/5 B/L upper and lower extremities; DTRs 2+ intact and symmetric    Cardiovascular Diagnostic Testing:  ECG: SB no acute abnormalities    Labs:                        15.0   6.7   )-----------( 113      ( 05 Nov 2018 07:28 )             46.2     11-05    141  |  106  |  22  ----------------------------<  93  4.0   |  27  |  0.78    Ca    8.9      05 Nov 2018 07:28  Mg     2.0     11-05    TPro  6.2  /  Alb  3.1<L>  /  TBili  0.7  /  DBili  x   /  AST  26  /  ALT  32  /  AlkPhos  72  11-05    PT/INR - ( 04 Nov 2018 19:50 )   PT: 12.7 sec;   INR: 1.13 ratio         PTT - ( 04 Nov 2018 19:50 )  PTT:29.8 sec  CARDIAC MARKERS ( 05 Nov 2018 07:28 )  <.017 ng/mL / x     / x     / x     / x      CARDIAC MARKERS ( 04 Nov 2018 23:00 )  .030 ng/mL / x     / x     / x     / x      CARDIAC MARKERS ( 04 Nov 2018 19:50 )  .021 ng/mL / x     / 115 U/L / x     / 2.5 ng/mL                Imaging:

## 2018-11-05 NOTE — DISCHARGE NOTE ADULT - HOSPITAL COURSE
83M hx of CAD, s/p CABG x5 (2006), HTN, HLD, hypothyroid, diffuse OA (neck, hands, hips, knees) pw s/p syncope and collapse.   Pt. states he was disoriented in the dark and fell back and hit his head.  He had no chest pain, palpitations, respiratory distress prior to the fall.  Pt. had trops cycled,  neg. x 3.  Cardiology   < from: CT Angio Chest w/ IV Cont (11.04.18 @ 22:07) >    IMPRESSION:    No acute findings          < end of copied text >  < from: CT Head No Cont (11.04.18 @ 19:51) >    Impression:  1. Unremarkable noncontrast CT scan of the brain.          < end of copied text >

## 2018-11-05 NOTE — DISCHARGE NOTE ADULT - PATIENT PORTAL LINK FT
You can access the [facility] Patient Portal, offered by [facility], by registering with the following website: [email or web address]

## 2018-11-05 NOTE — PROGRESS NOTE ADULT - ASSESSMENT
83M hx of CAD, s/p CABG x5 (2006), HTN, HLD, hypothyroid, borderline DM (not req meds), diffuse OA (neck, hands, hips, knees) pw s/p syncope and collapse.     Problem/Plan - 1:  ·  Problem: Syncope and collapse.  Plan: abnormal CXR r/o PNA/PE  tele monitoring to assess for arrthymias, bradycardia  trend trops, ECHO, card  CThead/neck: prelim report neg for CVA/ICH or fx  check UA to exclude occult UTI.      Problem/Plan - 2:  ·  Problem: Coronary artery disease involving native coronary artery of native heart, angina presence unspecified.  Plan: cont ASA, BB, statin.      Problem/Plan - 3:  ·  Problem: Essential hypertension.  Plan: cont BB.      Problem/Plan - 4:  ·  Problem: High cholesterol.  Plan: cont statin.      Problem/Plan - 5:  ·  Problem: Hypothyroidism, unspecified type.  Plan: cont synthroid, check TSH.      Problem/Plan - 6:  Problem: Type 2 diabetes mellitus without complication, without long-term current use of insulin. Plan: pre-dm not on meds, check hga1c. 83M hx of CAD, s/p CABG x5 (2006), HTN, HLD, hypothyroid, diffuse OA (neck, hands, hips, knees) pw s/p syncope and collapse.   Pt. states he was disoriented in the dark and fell back and hit his head.  He had no chest pain, palpitations, respiratory distress prior to the fall.

## 2022-09-27 PROBLEM — Z00.00 ENCOUNTER FOR PREVENTIVE HEALTH EXAMINATION: Status: ACTIVE | Noted: 2022-09-27

## 2022-09-27 PROBLEM — I25.10 ATHEROSCLEROTIC HEART DISEASE OF NATIVE CORONARY ARTERY WITHOUT ANGINA PECTORIS: Chronic | Status: ACTIVE | Noted: 2018-11-04

## 2022-09-27 PROBLEM — E03.9 HYPOTHYROIDISM, UNSPECIFIED: Chronic | Status: ACTIVE | Noted: 2018-11-04

## 2022-09-27 PROBLEM — E78.00 PURE HYPERCHOLESTEROLEMIA, UNSPECIFIED: Chronic | Status: ACTIVE | Noted: 2018-11-04

## 2022-09-28 ENCOUNTER — APPOINTMENT (OUTPATIENT)
Dept: ORTHOPEDIC SURGERY | Facility: CLINIC | Age: 87
End: 2022-09-28

## 2022-09-28 VITALS
HEART RATE: 60 BPM | DIASTOLIC BLOOD PRESSURE: 62 MMHG | WEIGHT: 169 LBS | BODY MASS INDEX: 25.61 KG/M2 | HEIGHT: 68 IN | SYSTOLIC BLOOD PRESSURE: 103 MMHG

## 2022-09-28 PROCEDURE — 99204 OFFICE O/P NEW MOD 45 MIN: CPT

## 2022-09-28 NOTE — PHYSICAL EXAM
[Antalgic] : antalgic [Stooped] : stooped [de-identified] : Examination of the lumbar spine reveals no midline tenderness palpation, step-offs, or skin lesions. Decreased range of motion with respect to flexion, extension, lateral bending, and rotation. No tenderness to palpation of the sciatic notch. No tenderness palpation of the bilateral greater trochanters. No pain with passive internal/external rotation of the hips. No instability of bilateral lower extremities.  Negative NISHA. Negative straight leg raise bilaterally. No bowstring. Negative femoral stretch. 5 out of 5 iliopsoas, hip abductors, hips adductors, quadriceps, hamstrings, gastrocsoleus, tibialis anterior, extensor hallucis longus, peroneals. Grossly intact sensation to light touch bilateral lower extremities. 1+ patellar and Achilles reflexes. Downgoing Babinski. No clonus. Intact proprioception. Palpable pulses. No skin lesion and no edema on the right and left lower extremities. [de-identified] : Review of his lumbar spine MRI reveals moderate to severe stenosis from L3-5 with L4-5 spondylolisthesis

## 2022-09-28 NOTE — HISTORY OF PRESENT ILLNESS
[de-identified] : Mr. ELIJAH NOLAN  is a 87 year old male who presents with back and bilateral leg pain that has gotten worse over time.  He has done FLO's and nerve blocks with some relief.  His pain management MD wanted him to get a surgical opinion before doing an ablation.  Normal bowel and bladder control.   Denies any recent fevers, chills, sweats, weight loss, or infection.\par \par The patients past medical history, past surgical history, medications, allergies, and social history were reviewed by me today with the patient and documented accordingly.  In addition, the patient's family history, which is noncontributory to their visit, was also reviewed.\par

## 2022-09-28 NOTE — DISCUSSION/SUMMARY
[de-identified] : We discussed further treatment options both nonsurgical and surgical.  We did discuss the role of a lumbar decompression from L3-5 with possible instrumented fusion at L4-5 given his mobile spine listhesis.  Risks and benefits were discussed at length.  In the interim he will continue with injection based therapies.  He will consult with his medical physicians including his cardiologist whether or not he would even be a candidate for a procedure and general anesthesia.  He will let me know how he would like to proceed.

## 2022-11-02 ENCOUNTER — APPOINTMENT (OUTPATIENT)
Dept: ORTHOPEDIC SURGERY | Facility: CLINIC | Age: 87
End: 2022-11-02

## 2022-11-02 VITALS
HEART RATE: 65 BPM | WEIGHT: 171 LBS | HEIGHT: 68 IN | DIASTOLIC BLOOD PRESSURE: 74 MMHG | SYSTOLIC BLOOD PRESSURE: 124 MMHG | BODY MASS INDEX: 25.91 KG/M2

## 2022-11-02 PROCEDURE — 99215 OFFICE O/P EST HI 40 MIN: CPT

## 2022-11-02 NOTE — PHYSICAL EXAM
[Antalgic] : antalgic [Stooped] : stooped [de-identified] : Examination of the lumbar spine reveals no midline tenderness palpation, step-offs, or skin lesions. Decreased range of motion with respect to flexion, extension, lateral bending, and rotation. No tenderness to palpation of the sciatic notch. No tenderness palpation of the bilateral greater trochanters. No pain with passive internal/external rotation of the hips. No instability of bilateral lower extremities.  Negative NISHA. Negative straight leg raise bilaterally. No bowstring. Negative femoral stretch. 5 out of 5 iliopsoas, hip abductors, hips adductors, quadriceps, hamstrings, gastrocsoleus, tibialis anterior, extensor hallucis longus, peroneals. Grossly intact sensation to light touch bilateral lower extremities. 1+ patellar and Achilles reflexes. Downgoing Babinski. No clonus. Intact proprioception. Palpable pulses. No skin lesion and no edema on the right and left lower extremities. [de-identified] : Review of his lumbar spine MRI reveals moderate to severe stenosis from L3-5 with L4-5 spondylolisthesis

## 2022-11-02 NOTE — HISTORY OF PRESENT ILLNESS
[de-identified] : Mr. ELIJAH NOLAN  is a 87 year old male who presents to the office for a follow-up visit.  He is here to discuss surgery.  His cardiologist did a stress test and advised him he would clear him for surgery. \par

## 2022-11-02 NOTE — DISCUSSION/SUMMARY
[de-identified] : We again discussed further treatment options.  He indicates he has been cleared medically for surgery.  We again discussed the nature purpose of an L3-5 decompression possible for L4/5 fusion given his mobile spondylolisthesis.  Risks of surgery were discussed including but not limited to bleeding, infection,  hardware related complications, graft migration, pseudoarthrosis, damage to nerves and vessels, continued or worsening pain and weakness, dural injury, CSF leak, need for further procedures, PE/DVT, GI, , pulmonary, and cardiac complications,  anesthetic risks, and death. The patient understands the risks and benefits and alternatives. They would like to proceed with surgery. They will be scheduled accordingly. They will follow up for a preoperative appointment.

## 2022-11-28 ENCOUNTER — OUTPATIENT (OUTPATIENT)
Dept: OUTPATIENT SERVICES | Facility: HOSPITAL | Age: 87
LOS: 1 days | End: 2022-11-28

## 2022-11-28 VITALS
HEIGHT: 67.5 IN | WEIGHT: 175.93 LBS | HEART RATE: 79 BPM | DIASTOLIC BLOOD PRESSURE: 80 MMHG | SYSTOLIC BLOOD PRESSURE: 140 MMHG | TEMPERATURE: 98 F | OXYGEN SATURATION: 97 % | RESPIRATION RATE: 14 BRPM

## 2022-11-28 DIAGNOSIS — M43.16 SPONDYLOLISTHESIS, LUMBAR REGION: ICD-10-CM

## 2022-11-28 DIAGNOSIS — Z90.89 ACQUIRED ABSENCE OF OTHER ORGANS: Chronic | ICD-10-CM

## 2022-11-28 DIAGNOSIS — M54.9 DORSALGIA, UNSPECIFIED: ICD-10-CM

## 2022-11-28 DIAGNOSIS — G47.33 OBSTRUCTIVE SLEEP APNEA (ADULT) (PEDIATRIC): ICD-10-CM

## 2022-11-28 DIAGNOSIS — Z95.1 PRESENCE OF AORTOCORONARY BYPASS GRAFT: Chronic | ICD-10-CM

## 2022-11-28 DIAGNOSIS — Z95.5 PRESENCE OF CORONARY ANGIOPLASTY IMPLANT AND GRAFT: Chronic | ICD-10-CM

## 2022-11-28 DIAGNOSIS — I25.10 ATHEROSCLEROTIC HEART DISEASE OF NATIVE CORONARY ARTERY WITHOUT ANGINA PECTORIS: ICD-10-CM

## 2022-11-28 DIAGNOSIS — N40.0 BENIGN PROSTATIC HYPERPLASIA WITHOUT LOWER URINARY TRACT SYMPTOMS: ICD-10-CM

## 2022-11-28 DIAGNOSIS — M48.061 SPINAL STENOSIS, LUMBAR REGION WITHOUT NEUROGENIC CLAUDICATION: ICD-10-CM

## 2022-11-28 DIAGNOSIS — E03.9 HYPOTHYROIDISM, UNSPECIFIED: ICD-10-CM

## 2022-11-28 LAB
A1C WITH ESTIMATED AVERAGE GLUCOSE RESULT: 5.6 % — SIGNIFICANT CHANGE UP (ref 4–5.6)
ALBUMIN SERPL ELPH-MCNC: 4 G/DL — SIGNIFICANT CHANGE UP (ref 3.3–5)
ALP SERPL-CCNC: 80 U/L — SIGNIFICANT CHANGE UP (ref 40–120)
ALT FLD-CCNC: 22 U/L — SIGNIFICANT CHANGE UP (ref 4–41)
ANION GAP SERPL CALC-SCNC: 9 MMOL/L — SIGNIFICANT CHANGE UP (ref 7–14)
AST SERPL-CCNC: 24 U/L — SIGNIFICANT CHANGE UP (ref 4–40)
BILIRUB SERPL-MCNC: 0.3 MG/DL — SIGNIFICANT CHANGE UP (ref 0.2–1.2)
BLD GP AB SCN SERPL QL: NEGATIVE — SIGNIFICANT CHANGE UP
BUN SERPL-MCNC: 32 MG/DL — HIGH (ref 7–23)
CALCIUM SERPL-MCNC: 9.5 MG/DL — SIGNIFICANT CHANGE UP (ref 8.4–10.5)
CHLORIDE SERPL-SCNC: 105 MMOL/L — SIGNIFICANT CHANGE UP (ref 98–107)
CO2 SERPL-SCNC: 25 MMOL/L — SIGNIFICANT CHANGE UP (ref 22–31)
CREAT SERPL-MCNC: 0.82 MG/DL — SIGNIFICANT CHANGE UP (ref 0.5–1.3)
EGFR: 86 ML/MIN/1.73M2 — SIGNIFICANT CHANGE UP
ESTIMATED AVERAGE GLUCOSE: 114 — SIGNIFICANT CHANGE UP
GLUCOSE SERPL-MCNC: 139 MG/DL — HIGH (ref 70–99)
HCT VFR BLD CALC: 37.7 % — LOW (ref 39–50)
HGB BLD-MCNC: 12.3 G/DL — LOW (ref 13–17)
MCHC RBC-ENTMCNC: 31.6 PG — SIGNIFICANT CHANGE UP (ref 27–34)
MCHC RBC-ENTMCNC: 32.6 GM/DL — SIGNIFICANT CHANGE UP (ref 32–36)
MCV RBC AUTO: 96.9 FL — SIGNIFICANT CHANGE UP (ref 80–100)
NRBC # BLD: 0 /100 WBCS — SIGNIFICANT CHANGE UP (ref 0–0)
NRBC # FLD: 0 K/UL — SIGNIFICANT CHANGE UP (ref 0–0)
PLATELET # BLD AUTO: 129 K/UL — LOW (ref 150–400)
POTASSIUM SERPL-MCNC: 4.2 MMOL/L — SIGNIFICANT CHANGE UP (ref 3.5–5.3)
POTASSIUM SERPL-SCNC: 4.2 MMOL/L — SIGNIFICANT CHANGE UP (ref 3.5–5.3)
PROT SERPL-MCNC: 6.7 G/DL — SIGNIFICANT CHANGE UP (ref 6–8.3)
RBC # BLD: 3.89 M/UL — LOW (ref 4.2–5.8)
RBC # FLD: 13.1 % — SIGNIFICANT CHANGE UP (ref 10.3–14.5)
RH IG SCN BLD-IMP: POSITIVE — SIGNIFICANT CHANGE UP
SODIUM SERPL-SCNC: 139 MMOL/L — SIGNIFICANT CHANGE UP (ref 135–145)
WBC # BLD: 3.97 K/UL — SIGNIFICANT CHANGE UP (ref 3.8–10.5)
WBC # FLD AUTO: 3.97 K/UL — SIGNIFICANT CHANGE UP (ref 3.8–10.5)

## 2022-11-28 PROCEDURE — 93010 ELECTROCARDIOGRAM REPORT: CPT

## 2022-11-28 RX ORDER — SIMVASTATIN 20 MG/1
0 TABLET, FILM COATED ORAL
Qty: 0 | Refills: 0 | DISCHARGE

## 2022-11-28 RX ORDER — ASPIRIN/CALCIUM CARB/MAGNESIUM 324 MG
0 TABLET ORAL
Qty: 0 | Refills: 0 | DISCHARGE

## 2022-11-28 RX ORDER — METOPROLOL TARTRATE 50 MG
1 TABLET ORAL
Qty: 0 | Refills: 0 | DISCHARGE

## 2022-11-28 RX ORDER — LEVOTHYROXINE SODIUM 125 MCG
1 TABLET ORAL
Qty: 0 | Refills: 0 | DISCHARGE

## 2022-11-28 RX ORDER — SODIUM CHLORIDE 9 MG/ML
1000 INJECTION, SOLUTION INTRAVENOUS
Refills: 0 | Status: DISCONTINUED | OUTPATIENT
Start: 2022-12-13 | End: 2022-12-13

## 2022-11-28 RX ORDER — DICLOFENAC SODIUM/MISOPROSTOL 50 MG-200
0 TABLET,IMMEDIATE,DELAY RELEASE,BIPHASE ORAL
Qty: 0 | Refills: 0 | DISCHARGE

## 2022-11-28 NOTE — H&P PST ADULT - PROBLEM SELECTOR PLAN 1
Patient is tentatively scheduled for L3-L5 Laminectomy L4-L5 fusion with Dr Mosher on 12/13/2022.    Pre-op instructions provided. Pt given verbal and written instructions with teach back on chlorhexidine wash and pepcid. Pt verbalized understanding with return demonstration.    Routine Covid PCR test ordered .Instructions regarding covid PCR test and locations for covid testing site provided. Pt verbalized understanding

## 2022-11-28 NOTE — H&P PST ADULT - NSICDXPASTMEDICALHX_GEN_ALL_CORE_FT
PAST MEDICAL HISTORY:  Borderline diabetes     Coronary artery disease involving native coronary artery of native heart, angina presence unspecified     High cholesterol     History of immunotherapy     Hypothyroid     Obstructive sleep apnea pt reports PMH on CPAP in past, after losing weight he stopped using CPAP    Prostate cancer     S/P radiation therapy

## 2022-11-28 NOTE — H&P PST ADULT - NSICDXPASTSURGICALHX_GEN_ALL_CORE_FT
PAST SURGICAL HISTORY:  S/P CABG x 5     S/P primary angioplasty with coronary stent 06/2021    S/P tonsillectomy

## 2022-11-28 NOTE — H&P PST ADULT - NSICDXFAMILYHX_GEN_ALL_CORE_FT
FAMILY HISTORY:  Mother  Still living? Unknown  Family history of cerebrovascular accident (CVA), Age at diagnosis: Age Unknown    Sibling  Still living? Unknown  Family history of diabetes mellitus, Age at diagnosis: Age Unknown

## 2022-11-28 NOTE — H&P PST ADULT - PROBLEM SELECTOR PLAN 2
Patient with CAD- s/p CABG and s/p stents x2- on aspirin- requesting cardiology evaluation prior to surgery.( surgeon also request's)- patient has appointment with cardiologist on 11/30/2022.Instructed pt to get pre op aspirin instructions  from cardiologist.    Will request comparison EKG and ECHO results.

## 2022-11-28 NOTE — H&P PST ADULT - HISTORY OF PRESENT ILLNESS
87 year old male with PMHof CAD, s/p CABG x5 (2006),s/p cardiac stents x2 06/2021, HTN, HLD, hypothyroid, borderline DM, presents to PST with pre op diagnosis of spinal stenosis Lumbosacral region, spondylolisthesis lumbar region, for pre op evaluation prior to scheduled L3-L5 Laminectomy L4-L5 fusion with Dr Mosher.

## 2022-11-29 LAB
MRSA PCR RESULT.: SIGNIFICANT CHANGE UP
S AUREUS DNA NOSE QL NAA+PROBE: SIGNIFICANT CHANGE UP

## 2022-12-07 ENCOUNTER — APPOINTMENT (OUTPATIENT)
Dept: ORTHOPEDIC SURGERY | Facility: CLINIC | Age: 87
End: 2022-12-07

## 2022-12-07 PROCEDURE — 99214 OFFICE O/P EST MOD 30 MIN: CPT

## 2022-12-07 NOTE — HISTORY OF PRESENT ILLNESS
[de-identified] : Mr. ELIJAH NOLAN  is a 87 year old male who presents to the office for a follow-up visit.  He is here to discuss surgery.

## 2022-12-07 NOTE — PHYSICAL EXAM
[Antalgic] : antalgic [Stooped] : stooped [de-identified] : Examination of the lumbar spine reveals no midline tenderness palpation, step-offs, or skin lesions. Decreased range of motion with respect to flexion, extension, lateral bending, and rotation. No tenderness to palpation of the sciatic notch. No tenderness palpation of the bilateral greater trochanters. No pain with passive internal/external rotation of the hips. No instability of bilateral lower extremities.  Negative NISHA. Negative straight leg raise bilaterally. No bowstring. Negative femoral stretch. 5 out of 5 iliopsoas, hip abductors, hips adductors, quadriceps, hamstrings, gastrocsoleus, tibialis anterior, extensor hallucis longus, peroneals. Grossly intact sensation to light touch bilateral lower extremities. 1+ patellar and Achilles reflexes. Downgoing Babinski. No clonus. Intact proprioception. Palpable pulses. No skin lesion and no edema on the right and left lower extremities. [de-identified] : Review of his lumbar spine MRI reveals moderate to severe stenosis from L3-5 with L4-5 spondylolisthesis

## 2022-12-07 NOTE — DISCUSSION/SUMMARY
[de-identified] : We again discussed further treatment options.  We discussed the nature and purpose of a lumbar decompression with possible instrumented fusion at L4-5.  Risks of surgery were discussed including but not limited to bleeding, infection,  hardware related complications, graft migration, pseudoarthrosis, damage to nerves and vessels, continued or worsening pain and weakness, dural injury, CSF leak, need for further procedures, PE/DVT, GI, , pulmonary, and cardiac complications,  anesthetic risks, and death. The patient understands the risks and benefits and alternatives. They would like to proceed with surgery.

## 2022-12-10 LAB — SARS-COV-2 N GENE NPH QL NAA+PROBE: NOT DETECTED

## 2022-12-12 ENCOUNTER — TRANSCRIPTION ENCOUNTER (OUTPATIENT)
Age: 87
End: 2022-12-12

## 2022-12-12 NOTE — ASU PATIENT PROFILE, ADULT - FALL HARM RISK - UNIVERSAL INTERVENTIONS
Bed in lowest position, wheels locked, appropriate side rails in place/Call bell, personal items and telephone in reach/Instruct patient to call for assistance before getting out of bed or chair/Non-slip footwear when patient is out of bed/[place] to call system/Physically safe environment - no spills, clutter or unnecessary equipment/Purposeful Proactive Rounding/Room/bathroom lighting operational, light cord in reach

## 2022-12-13 ENCOUNTER — INPATIENT (INPATIENT)
Facility: HOSPITAL | Age: 87
LOS: 3 days | Discharge: HOME | End: 2022-12-17
Attending: STUDENT IN AN ORGANIZED HEALTH CARE EDUCATION/TRAINING PROGRAM | Admitting: STUDENT IN AN ORGANIZED HEALTH CARE EDUCATION/TRAINING PROGRAM
Payer: MEDICARE

## 2022-12-13 ENCOUNTER — RESULT REVIEW (OUTPATIENT)
Age: 87
End: 2022-12-13

## 2022-12-13 ENCOUNTER — APPOINTMENT (OUTPATIENT)
Dept: ORTHOPEDIC SURGERY | Facility: HOSPITAL | Age: 87
End: 2022-12-13

## 2022-12-13 VITALS
DIASTOLIC BLOOD PRESSURE: 58 MMHG | OXYGEN SATURATION: 99 % | WEIGHT: 175.93 LBS | TEMPERATURE: 98 F | SYSTOLIC BLOOD PRESSURE: 145 MMHG | RESPIRATION RATE: 61 BRPM | HEIGHT: 67.5 IN

## 2022-12-13 DIAGNOSIS — Z95.5 PRESENCE OF CORONARY ANGIOPLASTY IMPLANT AND GRAFT: Chronic | ICD-10-CM

## 2022-12-13 DIAGNOSIS — Z90.89 ACQUIRED ABSENCE OF OTHER ORGANS: Chronic | ICD-10-CM

## 2022-12-13 DIAGNOSIS — M43.16 SPONDYLOLISTHESIS, LUMBAR REGION: ICD-10-CM

## 2022-12-13 DIAGNOSIS — Z95.1 PRESENCE OF AORTOCORONARY BYPASS GRAFT: Chronic | ICD-10-CM

## 2022-12-13 PROCEDURE — 63267 EXCISE INTRSPINL LESION LMBR: CPT | Mod: 59

## 2022-12-13 PROCEDURE — 88304 TISSUE EXAM BY PATHOLOGIST: CPT | Mod: 26

## 2022-12-13 PROCEDURE — 22612 ARTHRD PST TQ 1NTRSPC LUMBAR: CPT

## 2022-12-13 PROCEDURE — 22214 INCIS 1 VERTEBRAL SEG LUMBAR: CPT

## 2022-12-13 PROCEDURE — 88311 DECALCIFY TISSUE: CPT | Mod: 26

## 2022-12-13 PROCEDURE — 22840 INSERT SPINE FIXATION DEVICE: CPT

## 2022-12-13 DEVICE — SCREW BLOCKER BONE XIA: Type: IMPLANTABLE DEVICE | Status: FUNCTIONAL

## 2022-12-13 DEVICE — SURGIFLO MATRIX WITH THROMBIN KIT: Type: IMPLANTABLE DEVICE | Status: FUNCTIONAL

## 2022-12-13 DEVICE — FIBREGRAFT BG MATRIX 6.25CC: Type: IMPLANTABLE DEVICE | Status: FUNCTIONAL

## 2022-12-13 DEVICE — BONE WAX 2.5GM: Type: IMPLANTABLE DEVICE | Status: FUNCTIONAL

## 2022-12-13 DEVICE — SCREW SERRATO POLY 7.5X50MM: Type: IMPLANTABLE DEVICE | Status: FUNCTIONAL

## 2022-12-13 DEVICE — IMPLANTABLE DEVICE: Type: IMPLANTABLE DEVICE | Status: FUNCTIONAL

## 2022-12-13 DEVICE — SURGIFOAM PAD 8CM X 12.5CM X 2MM (100C): Type: IMPLANTABLE DEVICE | Status: FUNCTIONAL

## 2022-12-13 RX ORDER — CEFAZOLIN SODIUM 1 G
2000 VIAL (EA) INJECTION EVERY 8 HOURS
Refills: 0 | Status: COMPLETED | OUTPATIENT
Start: 2022-12-14 | End: 2022-12-14

## 2022-12-13 RX ORDER — POLYETHYLENE GLYCOL 3350 17 G/17G
17 POWDER, FOR SOLUTION ORAL DAILY
Refills: 0 | Status: DISCONTINUED | OUTPATIENT
Start: 2022-12-13 | End: 2022-12-17

## 2022-12-13 RX ORDER — OXYCODONE HYDROCHLORIDE 5 MG/1
5 TABLET ORAL EVERY 4 HOURS
Refills: 0 | Status: DISCONTINUED | OUTPATIENT
Start: 2022-12-13 | End: 2022-12-17

## 2022-12-13 RX ORDER — SODIUM CHLORIDE 9 MG/ML
1000 INJECTION, SOLUTION INTRAVENOUS
Refills: 0 | Status: DISCONTINUED | OUTPATIENT
Start: 2022-12-13 | End: 2022-12-17

## 2022-12-13 RX ORDER — HYDROMORPHONE HYDROCHLORIDE 2 MG/ML
0.5 INJECTION INTRAMUSCULAR; INTRAVENOUS; SUBCUTANEOUS
Refills: 0 | Status: DISCONTINUED | OUTPATIENT
Start: 2022-12-13 | End: 2022-12-13

## 2022-12-13 RX ORDER — ATORVASTATIN CALCIUM 80 MG/1
40 TABLET, FILM COATED ORAL AT BEDTIME
Refills: 0 | Status: DISCONTINUED | OUTPATIENT
Start: 2022-12-13 | End: 2022-12-17

## 2022-12-13 RX ORDER — TAMSULOSIN HYDROCHLORIDE 0.4 MG/1
0.4 CAPSULE ORAL AT BEDTIME
Refills: 0 | Status: DISCONTINUED | OUTPATIENT
Start: 2022-12-13 | End: 2022-12-14

## 2022-12-13 RX ORDER — ONDANSETRON 8 MG/1
4 TABLET, FILM COATED ORAL ONCE
Refills: 0 | Status: DISCONTINUED | OUTPATIENT
Start: 2022-12-13 | End: 2022-12-14

## 2022-12-13 RX ORDER — ONDANSETRON 8 MG/1
4 TABLET, FILM COATED ORAL EVERY 6 HOURS
Refills: 0 | Status: DISCONTINUED | OUTPATIENT
Start: 2022-12-13 | End: 2022-12-13

## 2022-12-13 RX ORDER — SODIUM CHLORIDE 9 MG/ML
500 INJECTION, SOLUTION INTRAVENOUS ONCE
Refills: 0 | Status: COMPLETED | OUTPATIENT
Start: 2022-12-14 | End: 2022-12-14

## 2022-12-13 RX ORDER — PANTOPRAZOLE SODIUM 20 MG/1
40 TABLET, DELAYED RELEASE ORAL ONCE
Refills: 0 | Status: COMPLETED | OUTPATIENT
Start: 2022-12-13 | End: 2022-12-13

## 2022-12-13 RX ORDER — LEVOTHYROXINE SODIUM 125 MCG
100 TABLET ORAL DAILY
Refills: 0 | Status: DISCONTINUED | OUTPATIENT
Start: 2022-12-13 | End: 2022-12-17

## 2022-12-13 RX ORDER — ACETAMINOPHEN 500 MG
1000 TABLET ORAL ONCE
Refills: 0 | Status: COMPLETED | OUTPATIENT
Start: 2022-12-13 | End: 2022-12-13

## 2022-12-13 RX ORDER — TRAMADOL HYDROCHLORIDE 50 MG/1
50 TABLET ORAL EVERY 6 HOURS
Refills: 0 | Status: DISCONTINUED | OUTPATIENT
Start: 2022-12-13 | End: 2022-12-17

## 2022-12-13 RX ORDER — CHOLECALCIFEROL (VITAMIN D3) 125 MCG
1000 CAPSULE ORAL DAILY
Refills: 0 | Status: DISCONTINUED | OUTPATIENT
Start: 2022-12-13 | End: 2022-12-17

## 2022-12-13 RX ORDER — HYDROMORPHONE HYDROCHLORIDE 2 MG/ML
30 INJECTION INTRAMUSCULAR; INTRAVENOUS; SUBCUTANEOUS
Refills: 0 | Status: DISCONTINUED | OUTPATIENT
Start: 2022-12-13 | End: 2022-12-13

## 2022-12-13 RX ORDER — OXYBUTYNIN CHLORIDE 5 MG
5 TABLET ORAL
Refills: 0 | Status: DISCONTINUED | OUTPATIENT
Start: 2022-12-13 | End: 2022-12-17

## 2022-12-13 RX ORDER — HYDROMORPHONE HYDROCHLORIDE 2 MG/ML
0.25 INJECTION INTRAMUSCULAR; INTRAVENOUS; SUBCUTANEOUS
Refills: 0 | Status: DISCONTINUED | OUTPATIENT
Start: 2022-12-13 | End: 2022-12-14

## 2022-12-13 RX ORDER — OXYCODONE HYDROCHLORIDE 5 MG/1
10 TABLET ORAL EVERY 4 HOURS
Refills: 0 | Status: DISCONTINUED | OUTPATIENT
Start: 2022-12-13 | End: 2022-12-17

## 2022-12-13 RX ORDER — SENNA PLUS 8.6 MG/1
2 TABLET ORAL AT BEDTIME
Refills: 0 | Status: DISCONTINUED | OUTPATIENT
Start: 2022-12-13 | End: 2022-12-17

## 2022-12-13 RX ORDER — HYDROMORPHONE HYDROCHLORIDE 2 MG/ML
0.5 INJECTION INTRAMUSCULAR; INTRAVENOUS; SUBCUTANEOUS
Refills: 0 | Status: DISCONTINUED | OUTPATIENT
Start: 2022-12-13 | End: 2022-12-14

## 2022-12-13 RX ORDER — CYCLOBENZAPRINE HYDROCHLORIDE 10 MG/1
10 TABLET, FILM COATED ORAL EVERY 8 HOURS
Refills: 0 | Status: DISCONTINUED | OUTPATIENT
Start: 2022-12-13 | End: 2022-12-17

## 2022-12-13 RX ORDER — ACETAMINOPHEN 500 MG
975 TABLET ORAL EVERY 8 HOURS
Refills: 0 | Status: DISCONTINUED | OUTPATIENT
Start: 2022-12-13 | End: 2022-12-17

## 2022-12-13 RX ORDER — ACETAMINOPHEN 500 MG
975 TABLET ORAL ONCE
Refills: 0 | Status: COMPLETED | OUTPATIENT
Start: 2022-12-13 | End: 2022-12-13

## 2022-12-13 RX ORDER — ASPIRIN/CALCIUM CARB/MAGNESIUM 324 MG
81 TABLET ORAL DAILY
Refills: 0 | Status: DISCONTINUED | OUTPATIENT
Start: 2022-12-13 | End: 2022-12-17

## 2022-12-13 RX ORDER — NALOXONE HYDROCHLORIDE 4 MG/.1ML
0.1 SPRAY NASAL
Refills: 0 | Status: DISCONTINUED | OUTPATIENT
Start: 2022-12-13 | End: 2022-12-13

## 2022-12-13 RX ADMIN — TAMSULOSIN HYDROCHLORIDE 0.4 MILLIGRAM(S): 0.4 CAPSULE ORAL at 23:28

## 2022-12-13 RX ADMIN — Medication 400 MILLIGRAM(S): at 23:07

## 2022-12-13 RX ADMIN — Medication 975 MILLIGRAM(S): at 13:53

## 2022-12-13 RX ADMIN — Medication 1000 MILLIGRAM(S): at 23:50

## 2022-12-13 RX ADMIN — SODIUM CHLORIDE 125 MILLILITER(S): 9 INJECTION, SOLUTION INTRAVENOUS at 21:30

## 2022-12-13 RX ADMIN — Medication 75 MILLIGRAM(S): at 13:54

## 2022-12-13 RX ADMIN — ATORVASTATIN CALCIUM 40 MILLIGRAM(S): 80 TABLET, FILM COATED ORAL at 23:07

## 2022-12-13 RX ADMIN — PANTOPRAZOLE SODIUM 40 MILLIGRAM(S): 20 TABLET, DELAYED RELEASE ORAL at 13:54

## 2022-12-13 RX ADMIN — SENNA PLUS 2 TABLET(S): 8.6 TABLET ORAL at 23:28

## 2022-12-13 RX ADMIN — Medication 975 MILLIGRAM(S): at 13:58

## 2022-12-13 NOTE — ASU PREOP CHECKLIST - TEMPERATURE IN FAHRENHEIT (DEGREES F)
JOSÉ MIGUEL Peña     Pt will take eliquis this month but unsure if he will be able to continue to afford it after that.    98.1

## 2022-12-14 ENCOUNTER — TRANSCRIPTION ENCOUNTER (OUTPATIENT)
Age: 87
End: 2022-12-14

## 2022-12-14 DIAGNOSIS — I10 ESSENTIAL (PRIMARY) HYPERTENSION: ICD-10-CM

## 2022-12-14 DIAGNOSIS — Z98.1 ARTHRODESIS STATUS: ICD-10-CM

## 2022-12-14 DIAGNOSIS — D62 ACUTE POSTHEMORRHAGIC ANEMIA: ICD-10-CM

## 2022-12-14 DIAGNOSIS — Z87.438 PERSONAL HISTORY OF OTHER DISEASES OF MALE GENITAL ORGANS: ICD-10-CM

## 2022-12-14 DIAGNOSIS — Z29.9 ENCOUNTER FOR PROPHYLACTIC MEASURES, UNSPECIFIED: ICD-10-CM

## 2022-12-14 LAB
ANION GAP SERPL CALC-SCNC: 7 MMOL/L — SIGNIFICANT CHANGE UP (ref 7–14)
BUN SERPL-MCNC: 22 MG/DL — SIGNIFICANT CHANGE UP (ref 7–23)
CALCIUM SERPL-MCNC: 8.6 MG/DL — SIGNIFICANT CHANGE UP (ref 8.4–10.5)
CHLORIDE SERPL-SCNC: 105 MMOL/L — SIGNIFICANT CHANGE UP (ref 98–107)
CO2 SERPL-SCNC: 24 MMOL/L — SIGNIFICANT CHANGE UP (ref 22–31)
CREAT SERPL-MCNC: 0.73 MG/DL — SIGNIFICANT CHANGE UP (ref 0.5–1.3)
EGFR: 89 ML/MIN/1.73M2 — SIGNIFICANT CHANGE UP
GLUCOSE BLDC GLUCOMTR-MCNC: 93 MG/DL — SIGNIFICANT CHANGE UP (ref 70–99)
GLUCOSE SERPL-MCNC: 147 MG/DL — HIGH (ref 70–99)
HCT VFR BLD CALC: 31.4 % — LOW (ref 39–50)
HGB BLD-MCNC: 10.3 G/DL — LOW (ref 13–17)
MCHC RBC-ENTMCNC: 31.4 PG — SIGNIFICANT CHANGE UP (ref 27–34)
MCHC RBC-ENTMCNC: 32.8 GM/DL — SIGNIFICANT CHANGE UP (ref 32–36)
MCV RBC AUTO: 95.7 FL — SIGNIFICANT CHANGE UP (ref 80–100)
NRBC # BLD: 0 /100 WBCS — SIGNIFICANT CHANGE UP (ref 0–0)
NRBC # FLD: 0 K/UL — SIGNIFICANT CHANGE UP (ref 0–0)
PLATELET # BLD AUTO: 113 K/UL — LOW (ref 150–400)
POTASSIUM SERPL-MCNC: 4.2 MMOL/L — SIGNIFICANT CHANGE UP (ref 3.5–5.3)
POTASSIUM SERPL-SCNC: 4.2 MMOL/L — SIGNIFICANT CHANGE UP (ref 3.5–5.3)
RBC # BLD: 3.28 M/UL — LOW (ref 4.2–5.8)
RBC # FLD: 12.8 % — SIGNIFICANT CHANGE UP (ref 10.3–14.5)
SODIUM SERPL-SCNC: 136 MMOL/L — SIGNIFICANT CHANGE UP (ref 135–145)
WBC # BLD: 6.92 K/UL — SIGNIFICANT CHANGE UP (ref 3.8–10.5)
WBC # FLD AUTO: 6.92 K/UL — SIGNIFICANT CHANGE UP (ref 3.8–10.5)

## 2022-12-14 PROCEDURE — 99223 1ST HOSP IP/OBS HIGH 75: CPT

## 2022-12-14 RX ORDER — TAMSULOSIN HYDROCHLORIDE 0.4 MG/1
0.8 CAPSULE ORAL AT BEDTIME
Refills: 0 | Status: DISCONTINUED | OUTPATIENT
Start: 2022-12-14 | End: 2022-12-17

## 2022-12-14 RX ADMIN — TAMSULOSIN HYDROCHLORIDE 0.8 MILLIGRAM(S): 0.4 CAPSULE ORAL at 22:03

## 2022-12-14 RX ADMIN — SODIUM CHLORIDE 1000 MILLILITER(S): 9 INJECTION, SOLUTION INTRAVENOUS at 05:54

## 2022-12-14 RX ADMIN — Medication 5 MILLIGRAM(S): at 05:53

## 2022-12-14 RX ADMIN — Medication 975 MILLIGRAM(S): at 05:53

## 2022-12-14 RX ADMIN — OXYCODONE HYDROCHLORIDE 5 MILLIGRAM(S): 5 TABLET ORAL at 13:32

## 2022-12-14 RX ADMIN — POLYETHYLENE GLYCOL 3350 17 GRAM(S): 17 POWDER, FOR SOLUTION ORAL at 12:20

## 2022-12-14 RX ADMIN — Medication 1000 UNIT(S): at 12:20

## 2022-12-14 RX ADMIN — OXYCODONE HYDROCHLORIDE 10 MILLIGRAM(S): 5 TABLET ORAL at 22:03

## 2022-12-14 RX ADMIN — OXYCODONE HYDROCHLORIDE 5 MILLIGRAM(S): 5 TABLET ORAL at 10:14

## 2022-12-14 RX ADMIN — Medication 100 MICROGRAM(S): at 05:53

## 2022-12-14 RX ADMIN — Medication 5 MILLIGRAM(S): at 18:04

## 2022-12-14 RX ADMIN — TRAMADOL HYDROCHLORIDE 50 MILLIGRAM(S): 50 TABLET ORAL at 03:30

## 2022-12-14 RX ADMIN — OXYCODONE HYDROCHLORIDE 5 MILLIGRAM(S): 5 TABLET ORAL at 09:14

## 2022-12-14 RX ADMIN — TRAMADOL HYDROCHLORIDE 50 MILLIGRAM(S): 50 TABLET ORAL at 02:24

## 2022-12-14 RX ADMIN — Medication 975 MILLIGRAM(S): at 13:32

## 2022-12-14 RX ADMIN — OXYCODONE HYDROCHLORIDE 5 MILLIGRAM(S): 5 TABLET ORAL at 14:20

## 2022-12-14 RX ADMIN — Medication 100 MILLIGRAM(S): at 09:09

## 2022-12-14 RX ADMIN — Medication 975 MILLIGRAM(S): at 22:03

## 2022-12-14 RX ADMIN — Medication 100 MILLIGRAM(S): at 00:49

## 2022-12-14 RX ADMIN — CYCLOBENZAPRINE HYDROCHLORIDE 10 MILLIGRAM(S): 10 TABLET, FILM COATED ORAL at 17:33

## 2022-12-14 RX ADMIN — Medication 81 MILLIGRAM(S): at 12:20

## 2022-12-14 RX ADMIN — Medication 1 TABLET(S): at 12:20

## 2022-12-14 RX ADMIN — ATORVASTATIN CALCIUM 40 MILLIGRAM(S): 80 TABLET, FILM COATED ORAL at 22:03

## 2022-12-14 RX ADMIN — SENNA PLUS 2 TABLET(S): 8.6 TABLET ORAL at 22:04

## 2022-12-14 RX ADMIN — OXYCODONE HYDROCHLORIDE 10 MILLIGRAM(S): 5 TABLET ORAL at 23:00

## 2022-12-14 NOTE — OCCUPATIONAL THERAPY INITIAL EVALUATION ADULT - LIVES WITH, PROFILE
Lives with his wife in a private home with 3-4 steps to enter + 13 steps to 2nd floor (reports he is able to stay on the 1st floor if needed).

## 2022-12-14 NOTE — DISCHARGE NOTE PROVIDER - CARE PROVIDER_API CALL
[facility] Mosher)  Orthopaedic Surgery  [address], [address]  [place] [address]  Phone: [phone]  Fax: [phone]  Established Patient  Follow Up Time:

## 2022-12-14 NOTE — DISCHARGE NOTE PROVIDER - NSDCMRMEDTOKEN_GEN_ALL_CORE_FT
aspirin 81 mg oral tablet: 1 tab(s) orally once a day PM  chondroitin: 1500 milligram(s) orally once a day AM  12/6/2022  diclofenac sodium 75 mg oral delayed release tablet: 1 tab(s) orally every odd days PM  12/6/2022  glucosamine: 1 tab(s) orally 3 times a day  12/6/2022  Multiple Vitamins oral tablet: 1 tab(s) orally once a day AM  12/6/2022  oxybutynin 5 mg oral tablet: 1 tab(s) orally 2 times a day  simvastatin 20 mg oral tablet: 1 tab(s) orally every other day PM  Synthroid 100 mcg (0.1 mg) oral tablet: 1 tab(s) orally once a day PM  tamsulosin 0.4 mg oral capsule: 1 cap(s) orally once a day PM  traMADol 50 mg oral tablet: 1 tab(s) orally 3 times a day, As Needed  Vitamin D3: 3000 international unit(s) orally once a day   acetaminophen 325 mg oral tablet: 3 tab(s) orally every 8 hours  aspirin 81 mg oral tablet: 1 tab(s) orally once a day PM  chondroitin: 1500 milligram(s) orally once a day AM  12/6/2022  cyclobenzaprine 10 mg oral tablet: 1 tab(s) orally every 8 hours, As needed, Muscle Spasm  glucosamine: 1 tab(s) orally 3 times a day  12/6/2022  Multiple Vitamins oral tablet: 1 tab(s) orally once a day AM  12/6/2022  naloxone 4 mg/0.1 mL nasal spray: 4 milligram(s) intranasally into one nostril. Repeart every 2-3 minutes as needed in alternating nostrils for oversedation  oxybutynin 5 mg oral tablet: 1 tab(s) orally 2 times a day  oxyCODONE 5 mg oral tablet: 1-2 tab(s) orally every 4-6 hours, As needed, Moderate Pain (4 - 6) MDD:8  pantoprazole 40 mg oral delayed release tablet: 1 tab(s) orally once a day (before a meal)  polyethylene glycol 3350 oral powder for reconstitution: 17 gram(s) orally once a day  senna leaf extract oral tablet: 2 tab(s) orally once a day (at bedtime)  simvastatin 20 mg oral tablet: 1 tab(s) orally every other day PM  Synthroid 100 mcg (0.1 mg) oral tablet: 1 tab(s) orally once a day PM  tamsulosin 0.4 mg oral capsule: 1 cap(s) orally once a day PM  traMADol 50 mg oral tablet: 1 tab(s) orally every 8 hours MDD:3  Vitamin D3: 3000 international unit(s) orally once a day

## 2022-12-14 NOTE — PATIENT PROFILE ADULT - FALL HARM RISK - HARM RISK INTERVENTIONS
Assistance with ambulation/Assistance OOB with selected safe patient handling equipment/Discuss with provider need for PT consult/Monitor for mental status changes/Monitor gait and stability/Provide patient with walking aids - walker, cane, crutches/Reinforce activity limits and safety measures with patient and family/Tailored Fall Risk Interventions/Use of alarms - bed, chair and/or voice tab/Visual Cue: Yellow wristband and red socks/Bed in lowest position, wheels locked, appropriate side rails in place/Call bell, personal items and telephone in reach/Instruct patient to call for assistance before getting out of bed or chair/Non-slip footwear when patient is out of bed/[place] to call system/Physically safe environment - no spills, clutter or unnecessary equipment/Purposeful Proactive Rounding/Room/bathroom lighting operational, light cord in reach

## 2022-12-14 NOTE — OCCUPATIONAL THERAPY INITIAL EVALUATION ADULT - PERTINENT HX OF CURRENT PROBLEM, REHAB EVAL
87 year old male admitted s/p L3-S1 laminectomy, L4-5 instrumented posterolateral fusion on 12/13/22.

## 2022-12-14 NOTE — PHYSICAL THERAPY INITIAL EVALUATION ADULT - GAIT DEVIATIONS NOTED, PT EVAL
decreased tanya/increased time in double stance/decreased velocity of limb motion/decreased step length/decreased stride length/decreased weight-shifting ability

## 2022-12-14 NOTE — DISCHARGE NOTE PROVIDER - ATTENDING ATTESTATION STATEMENT
I have personally seen and examined the patient. I have collaborated with and supervised the
Other (Free Text): Patient confirmed site by pointing biopsy site out
Note Text (......Xxx Chief Complaint.): This diagnosis correlates with the
Detail Level: Zone

## 2022-12-14 NOTE — PROGRESS NOTE ADULT - SUBJECTIVE AND OBJECTIVE BOX
Day _2__ of Anesthesia Pain Management Service    SUBJECTIVE:    Therapy:	  [ x] IV PCA	   [ ] Epidural           [ ] s/p Spinal Opoid              [ ] Postpartum infusion	  [ ] Patient controlled regional anesthesia (PCRA)    [ ] prn Analgesics    OBJECTIVE:   [x ] No new signs     [ ] Other:    Side Effects:  [x ] None			[ ] Other:    Assessment of Catheter Site:		[ ] Intact		[ ] Other:    ASSESSMENT/PLAN  [ ] Continue current therapy    [xx ] Therapy changed to:    [ ] IV PCA       [ ] Epidural     [ x] prn Analgesics     Comments:

## 2022-12-14 NOTE — OCCUPATIONAL THERAPY INITIAL EVALUATION ADULT - GENERAL OBSERVATIONS, REHAB EVAL
Patient received semisupine in bed in NAD; agreeable to participate in OT evaluation. +Wilson. +IV. +hemovac

## 2022-12-14 NOTE — PHYSICAL THERAPY INITIAL EVALUATION ADULT - GENERAL OBSERVATIONS, REHAB EVAL
Patient received in semifowler position in bed in [facility]. Patient denies chest pain, SOB, headache, and dizziness.

## 2022-12-14 NOTE — DISCHARGE NOTE PROVIDER - HOSPITAL COURSE
88 yo s/p L3-5 lami/ L4-5 PSF with Dr Mosher. Patient tolerated the procedure well without any complications.  Patient tolerated physical therapy well and pain is controlled.   A medical co-management attending has followed patient  for continuity of care and management and cleared for safe discharge.  Please follow up with Dr Mosher in 1-2 weeks.  Call office to make an appointment [phone].  Please follow up with your primary care physician as medications may have changed.  Please avoid any NSAIDS, aspirin or anti-inflammatory medications unless otherwise specified by your surgeon.  Avoid any heavy lifting, bending, squatting, twisting motion,  Keep dressing and/or incision clean and dry.  Remove dressing in 2 days.   Patient may shower, please avoid aiming shower stream directly onto incision.  Sutures/staples to be removed at office postop visit POD 14.  Patient is weight bear as tolerated.  Please notify Ortho with any questions.

## 2022-12-14 NOTE — CONSULT NOTE ADULT - ASSESSMENT
87M h/o BPH, HLP, hypothyroid, CAD s/p CABG x5 (2006) and cardiac stents x2 (06/2021), prostate cancer s/p radiation therapy and immunotherapy, JOSH, hypothyroid and borderline DM s/p L3-L5 Laminectomy and L4-L5 fusion POD#1.

## 2022-12-14 NOTE — CONSULT NOTE ADULT - SUBJECTIVE AND OBJECTIVE BOX
CHIEF COMPLAINT: Patient is a 87y old  Male who presents with a chief complaint of for back surgery (28 Nov 2022 10:41)    HPI: 87M h/o HLP, hypothyroid, CAD s/p CABG x5 (2006) and cardiac stents x2 (06/2021), prostate cancer s/p radiation therapy and immunotherapy, JOSH, hypothyroid and borderline DM, presents to [facility] with pre op diagnosis of spinal stenosis Lumbosacral region, spondylolisthesis lumbar region, for pre op evaluation prior to scheduled L3-L5 Laminectomy L4-L5 fusion with Dr Mosher. (28 Nov 2022 10:41)    Patient tolerated procedure well. Denies any F/C/N/V, CP or SOB.    Allergies: No Known Allergies  Intolerances: epinephrine (Other)    HOME MEDICATIONS: [x] Reviewed    MEDICATIONS  (STANDING):  acetaminophen     Tablet .. 975 milliGRAM(s) Oral every 8 hours  aspirin enteric coated 81 milliGRAM(s) Oral daily  atorvastatin 40 milliGRAM(s) Oral at bedtime  cholecalciferol 1000 Unit(s) Oral daily  lactated ringers. 1000 milliLiter(s) (125 mL/Hr) IV Continuous <Continuous>  levothyroxine 100 MICROGram(s) Oral daily  multivitamin 1 Tablet(s) Oral daily  oxybutynin 5 milliGRAM(s) Oral two times a day  polyethylene glycol 3350 17 Gram(s) Oral daily  senna 2 Tablet(s) Oral at bedtime  tamsulosin 0.4 milliGRAM(s) Oral at bedtime    MEDICATIONS  (PRN):  cyclobenzaprine 10 milliGRAM(s) Oral every 8 hours PRN Muscle Spasm  oxyCODONE    IR 5 milliGRAM(s) Oral every 4 hours PRN Moderate Pain (4 - 6)  oxyCODONE    IR 10 milliGRAM(s) Oral every 4 hours PRN Severe Pain (7 - 10)  traMADol 50 milliGRAM(s) Oral every 6 hours PRN Mild Pain (1 - 3)    PAST MEDICAL & SURGICAL HISTORY:  High cholesterol  Hypothyroid  Coronary artery disease involving native coronary artery of native heart, angina presence unspecified  Prostate cancer S/P radiation therapy  History of immunotherapy  Obstructive sleep apnea  pt reports PMH on CPAP in past, after losing weight he stopped using CPAP  Borderline diabetes  S/P CABG x 5  S/P tonsillectomy  S/P primary angioplasty with coronary stent 06/2021  [x ] Reviewed     SOCIAL HISTORY:  · Marital Status	  · Occupation	retired  · Lives With:	spouse     FAMILY HISTORY:  Family history of cerebrovascular accident (CVA) (Mother)  Family history of diabetes mellitus (Sibling)  [x] No pertinent family history in first degree relatives     REVIEW OF SYSTEMS:  [x] All other ROS negative  [  ] Unable to obtain due to poor mental status    Vital Signs Last 24 Hrs  T(C): 36.7 (14 Dec 2022 10:05), Max: 36.7 (13 Dec 2022 12:57)  T(F): 98 (14 Dec 2022 10:05), Max: 98.1 (13 Dec 2022 12:57)  HR: 90 (14 Dec 2022 10:05) (63 - 90)  BP: 128/52 (14 Dec 2022 10:05) (109/72 - 154/69)  BP(mean): 78 (14 Dec 2022 01:00) (75 - 91)  RR: 18 (14 Dec 2022 10:05) (11 - 61)  SpO2: 100% (14 Dec 2022 10:05) (92% - 100%)    Parameters below as of 14 Dec 2022 10:05  Patient On (Oxygen Delivery Method): room air    PHYSICAL EXAM:  GENERAL: NAD, well-groomed, well-developed  HEAD:  Atraumatic, Normocephalic  EYES: EOMI, PERRLA, conjunctiva and sclera clear  ENMT: Moist mucous membranes  NECK: Supple, No JVD  RESPIRATORY: Clear to auscultation bilaterally; No rales, rhonchi, wheezing, or rubs  CARDIOVASCULAR: Regular rate and rhythm; No murmurs, rubs, or gallops  GASTROINTESTINAL: Soft, Nontender, Nondistended; Bowel sounds present  GENITOURINARY: +parra  EXTREMITIES:  2+ Peripheral Pulses, No clubbing, cyanosis, or edema  NERVOUS SYSTEM:  Alert & Oriented X3; Moving all 4 extremities; No gross sensory deficits  HEME/LYMPH: No lymphadenopathy noted  SKIN: No rashes or lesions; dressing C/D/I; +HV    LABS:                        10.3   6.92  )-----------( 113      ( 14 Dec 2022 06:20 )             31.4     Hemoglobin: 10.3 g/dL (12-14 @ 06:20)    12-14    136  |  105  |  22  ----------------------------<  147<H>  4.2   |  24  |  0.73    Ca    8.6      14 Dec 2022 06:20    RADIOLOGY & ADDITIONAL STUDIES:  EKG:   Personally Reviewed:  [x] YES   < from: 12 Lead ECG (11.28.22 @ 10:47) >  Diagnosis Line Normal sinus rhythm  Left anterior fascicular block  Minimal voltage criteria for LVH, may be normal variant  Abnormal ECG    < end of copied text >              [ ] Consultant(s) Notes Reviewed  [x] Care Discussed with Consultants/Other Providers: Ortho PA - discussed management of anemia by monitoring CBC and pain control

## 2022-12-14 NOTE — DISCHARGE NOTE PROVIDER - NSDCCPCAREPLAN_GEN_ALL_CORE_FT
PRINCIPAL DISCHARGE DIAGNOSIS  Diagnosis: Spinal stenosis, lumbar  Assessment and Plan of Treatment:

## 2022-12-14 NOTE — CONSULT NOTE ADULT - PROBLEM SELECTOR RECOMMENDATION 9
-Pain well controlled; continue management and pain control per ortho recs with tylenol tid, oxycodone IR prn and tramadol prn  -c/w bowel regimen  -c/w incentive spirometer use  -c/w PT and f/u PT recs  -monitor HV  -parra in place - f/u with urology

## 2022-12-14 NOTE — CHART NOTE - NSCHARTNOTEFT_GEN_A_CORE
Patient seen at bedside. Patient states pain is well managed on current pain regimen. IV PCA was never started for patient as per primary RN. Continue current pain regimen. Pain service to sign off. Please call pain service if further assistance needed with pain management.

## 2022-12-14 NOTE — DISCHARGE NOTE PROVIDER - NSDCCPTREATMENT_GEN_ALL_CORE_FT
PRINCIPAL PROCEDURE  Procedure: Lumbar laminectomy with pedicle screw fusion  Findings and Treatment:

## 2022-12-14 NOTE — PROGRESS NOTE ADULT - SUBJECTIVE AND OBJECTIVE BOX
ORTHO POST-OP CHECK AND PROGRESS NOTE    SUBJECTIVE  No acute postop events. Pain controlled.    OBJECTIVE  Vital Signs Last 24 Hrs  T(C): 36.1 (13 Dec 2022 21:20), Max: 36.7 (13 Dec 2022 12:57)  T(F): 97 (13 Dec 2022 21:20), Max: 98.1 (13 Dec 2022 12:57)  HR: 65 (13 Dec 2022 23:30) (63 - 78)  BP: 138/65 (13 Dec 2022 23:30) (123/71 - 154/69)  BP(mean): 83 (13 Dec 2022 23:30) (75 - 91)  RR: 18 (13 Dec 2022 23:30) (11 - 61)  SpO2: 92% (13 Dec 2022 23:30) (92% - 100%)  Parameters below as of 13 Dec 2022 23:00  Patient On (Oxygen Delivery Method): room air      PHYSICAL EXAM  Gen: Lying in bed, NAD  Resp: No increased WOB  Spine:  Drain in place  Dressing c/d/i    Motor:                   C5                C6              C7               C8           T1   R            5/5                5/5            5/5             5/5          5/5  L             5/5               5/5             5/5             5/5          5/5                L2             L3             L4               L5            S1  R         5/5           5/5          5/5             5/5           5/5  L          5/5          5/5           5/5             5/5           5/5    Sensory:            C5         C6         C7      C8       T1        (0=absent, 1=impaired, 2=normal, NT=not testable)  R         2            2           2        2         2  L          2            2           2        2         2               L2          L3         L4      L5       S1         (0=absent, 1=impaired, 2=normal, NT=not testable)  R         2            2            2        2        2  L          2            2           2        2         2    LEs:  Calves soft, no TTP b/l  WWP b/l    LABS  Pending      ASSESSMENT & PLAN  87yMale s/p L3-5 lami + L4-5 PSF on 12/13/22.  -WBAT  -monitor drain output  -pain control  -incentive spirometry  -DVT ppx: ambulation/SCDs  -PT/OT  -dispo planning

## 2022-12-14 NOTE — DISCHARGE NOTE PROVIDER - NSDCFUSCHEDAPPT_GEN_ALL_CORE_FT
[facility]  ORTHOSURG [address]  Scheduled Appointment: 12/28/2022

## 2022-12-15 ENCOUNTER — TRANSCRIPTION ENCOUNTER (OUTPATIENT)
Age: 87
End: 2022-12-15

## 2022-12-15 DIAGNOSIS — R50.82 POSTPROCEDURAL FEVER: ICD-10-CM

## 2022-12-15 PROCEDURE — 99233 SBSQ HOSP IP/OBS HIGH 50: CPT

## 2022-12-15 RX ORDER — DEXAMETHASONE 0.5 MG/5ML
6 ELIXIR ORAL EVERY 6 HOURS
Refills: 0 | Status: DISCONTINUED | OUTPATIENT
Start: 2022-12-15 | End: 2022-12-15

## 2022-12-15 RX ORDER — DEXAMETHASONE 0.5 MG/5ML
6 ELIXIR ORAL EVERY 6 HOURS
Refills: 0 | Status: COMPLETED | OUTPATIENT
Start: 2022-12-15 | End: 2022-12-16

## 2022-12-15 RX ADMIN — Medication 975 MILLIGRAM(S): at 21:29

## 2022-12-15 RX ADMIN — Medication 100 MICROGRAM(S): at 06:00

## 2022-12-15 RX ADMIN — Medication 975 MILLIGRAM(S): at 06:01

## 2022-12-15 RX ADMIN — OXYCODONE HYDROCHLORIDE 10 MILLIGRAM(S): 5 TABLET ORAL at 12:09

## 2022-12-15 RX ADMIN — Medication 5 MILLIGRAM(S): at 06:01

## 2022-12-15 RX ADMIN — CYCLOBENZAPRINE HYDROCHLORIDE 10 MILLIGRAM(S): 10 TABLET, FILM COATED ORAL at 10:34

## 2022-12-15 RX ADMIN — Medication 5 MILLIGRAM(S): at 17:26

## 2022-12-15 RX ADMIN — Medication 975 MILLIGRAM(S): at 15:01

## 2022-12-15 RX ADMIN — SENNA PLUS 2 TABLET(S): 8.6 TABLET ORAL at 21:30

## 2022-12-15 RX ADMIN — TRAMADOL HYDROCHLORIDE 50 MILLIGRAM(S): 50 TABLET ORAL at 07:00

## 2022-12-15 RX ADMIN — OXYCODONE HYDROCHLORIDE 10 MILLIGRAM(S): 5 TABLET ORAL at 22:30

## 2022-12-15 RX ADMIN — Medication 1 TABLET(S): at 12:10

## 2022-12-15 RX ADMIN — TAMSULOSIN HYDROCHLORIDE 0.8 MILLIGRAM(S): 0.4 CAPSULE ORAL at 21:29

## 2022-12-15 RX ADMIN — TRAMADOL HYDROCHLORIDE 50 MILLIGRAM(S): 50 TABLET ORAL at 06:00

## 2022-12-15 RX ADMIN — Medication 1000 UNIT(S): at 16:07

## 2022-12-15 RX ADMIN — Medication 6 MILLIGRAM(S): at 16:07

## 2022-12-15 RX ADMIN — Medication 81 MILLIGRAM(S): at 12:09

## 2022-12-15 RX ADMIN — POLYETHYLENE GLYCOL 3350 17 GRAM(S): 17 POWDER, FOR SOLUTION ORAL at 12:10

## 2022-12-15 RX ADMIN — OXYCODONE HYDROCHLORIDE 10 MILLIGRAM(S): 5 TABLET ORAL at 21:36

## 2022-12-15 RX ADMIN — OXYCODONE HYDROCHLORIDE 10 MILLIGRAM(S): 5 TABLET ORAL at 12:39

## 2022-12-15 RX ADMIN — ATORVASTATIN CALCIUM 40 MILLIGRAM(S): 80 TABLET, FILM COATED ORAL at 21:30

## 2022-12-15 NOTE — DISCHARGE NOTE NURSING/CASE MANAGEMENT/SOCIAL WORK - NSDCPECAREGIVERED_GEN_ALL_CORE
Medline and carenotes for surgical procedure Laminectomy/Fusion, Spine precautions, Oxy IR, Tramadol, Incision care, Pain management, as well as DC Medications and side effects literature for patient reference.

## 2022-12-15 NOTE — DISCHARGE NOTE NURSING/CASE MANAGEMENT/SOCIAL WORK - PATIENT PORTAL LINK FT
You can access the FollowMyHealth Patient Portal offered by [facility] by registering at the following website: [email or web address]. By joining [employer]’s FollowMyHealth portal, you will also be able to view your health information using other applications (apps) compatible with our system.

## 2022-12-15 NOTE — PROGRESS NOTE ADULT - SUBJECTIVE AND OBJECTIVE BOX
CHIEF COMPLAINT: f/u spinal surgery    SUBJECTIVE / OVERNIGHT EVENTS: Patient seen and examined. Overnight, patient had fever to 100.7. Pain well controlled and patient without any complaints.    MEDICATIONS  (STANDING):  acetaminophen     Tablet .. 975 milliGRAM(s) Oral every 8 hours  aspirin enteric coated 81 milliGRAM(s) Oral daily  atorvastatin 40 milliGRAM(s) Oral at bedtime  cholecalciferol 1000 Unit(s) Oral daily  lactated ringers. 1000 milliLiter(s) (125 mL/Hr) IV Continuous <Continuous>  levothyroxine 100 MICROGram(s) Oral daily  multivitamin 1 Tablet(s) Oral daily  oxybutynin 5 milliGRAM(s) Oral two times a day  polyethylene glycol 3350 17 Gram(s) Oral daily  senna 2 Tablet(s) Oral at bedtime  tamsulosin 0.8 milliGRAM(s) Oral at bedtime    MEDICATIONS  (PRN):  cyclobenzaprine 10 milliGRAM(s) Oral every 8 hours PRN Muscle Spasm  oxyCODONE    IR 5 milliGRAM(s) Oral every 4 hours PRN Moderate Pain (4 - 6)  oxyCODONE    IR 10 milliGRAM(s) Oral every 4 hours PRN Severe Pain (7 - 10)  traMADol 50 milliGRAM(s) Oral every 6 hours PRN Mild Pain (1 - 3)      VITALS:  T(F): 98.2 (12-15-22 @ 09:15), Max: 100.7 (12-15-22 @ 01:58)  HR: 91 (12-15-22 @ 09:15) (68 - 91)  BP: 122/55 (12-15-22 @ 09:15) (114/39 - 135/52)  RR: 16 (12-15-22 @ 09:15) (16 - 18)  SpO2: 96% (12-15-22 @ 09:15)  Wt(kg): --      PHYSICAL EXAM:  GENERAL: NAD, well-developed  HEAD:  Atraumatic, Normocephalic  EYES: EOMI, PERRLA, conjunctiva and sclera clear  NECK: Supple, No JVD  CHEST/LUNG: Clear to auscultation bilaterally; No wheeze  HEART: Regular rate and rhythm; No murmurs, rubs, or gallops  ABDOMEN: Soft, Nontender, Nondistended; Bowel sounds present  EXTREMITIES:  2+ Peripheral Pulses, No clubbing, cyanosis, or edema  PSYCH: AAOx3  NEUROLOGY: non-focal  SKIN: No rashes or lesions    LABS:              10.3                 136  | 24   | 22           6.92  >-----------< 113     ------------------------< 147                   31.4                 4.2  | 105  | 0.73                                         Ca 8.6   Mg x     Ph x          [ ] Consultant(s) Notes Reviewed:  [x] Care Discussed with Consultants/Other Providers: Orthopedic PA - discussed management of fever with encouraging of incentive spirometer CHIEF COMPLAINT: f/u spinal surgery    SUBJECTIVE / OVERNIGHT EVENTS: Patient seen and examined. Overnight, patient had fever to 100.7. Pain well controlled and patient without any complaints.    MEDICATIONS  (STANDING):  acetaminophen     Tablet .. 975 milliGRAM(s) Oral every 8 hours  aspirin enteric coated 81 milliGRAM(s) Oral daily  atorvastatin 40 milliGRAM(s) Oral at bedtime  cholecalciferol 1000 Unit(s) Oral daily  lactated ringers. 1000 milliLiter(s) (125 mL/Hr) IV Continuous <Continuous>  levothyroxine 100 MICROGram(s) Oral daily  multivitamin 1 Tablet(s) Oral daily  oxybutynin 5 milliGRAM(s) Oral two times a day  polyethylene glycol 3350 17 Gram(s) Oral daily  senna 2 Tablet(s) Oral at bedtime  tamsulosin 0.8 milliGRAM(s) Oral at bedtime    MEDICATIONS  (PRN):  cyclobenzaprine 10 milliGRAM(s) Oral every 8 hours PRN Muscle Spasm  oxyCODONE    IR 5 milliGRAM(s) Oral every 4 hours PRN Moderate Pain (4 - 6)  oxyCODONE    IR 10 milliGRAM(s) Oral every 4 hours PRN Severe Pain (7 - 10)  traMADol 50 milliGRAM(s) Oral every 6 hours PRN Mild Pain (1 - 3)      VITALS:  T(F): 98.2 (12-15-22 @ 09:15), Max: 100.7 (12-15-22 @ 01:58)  HR: 91 (12-15-22 @ 09:15) (68 - 91)  BP: 122/55 (12-15-22 @ 09:15) (114/39 - 135/52)  RR: 16 (12-15-22 @ 09:15) (16 - 18)  SpO2: 96% (12-15-22 @ 09:15)  Wt(kg): --      PHYSICAL EXAM:  GENERAL: NAD, well-groomed, well-developed  RESPIRATORY: Clear to auscultation bilaterally; No rales, rhonchi, wheezing, or rubs  CARDIOVASCULAR: Regular rate and rhythm; No murmurs, rubs, or gallops  GASTROINTESTINAL: Soft, Nontender, Nondistended; Bowel sounds present  EXTREMITIES:  2+ Peripheral Pulses, No clubbing, cyanosis, or edema  SKIN: No rashes or lesions; dressing C/D/I; +HV      LABS:              10.3                 136  | 24   | 22           6.92  >-----------< 113     ------------------------< 147                   31.4                 4.2  | 105  | 0.73                                         Ca 8.6   Mg x     Ph x          [ ] Consultant(s) Notes Reviewed:  [x] Care Discussed with Consultants/Other Providers: Orthopedic PA - discussed management of fever with encouraging of incentive spirometer

## 2022-12-15 NOTE — PROGRESS NOTE ADULT - SUBJECTIVE AND OBJECTIVE BOX
ORTHOPEDIC PROGRESS NOTE     Subjective: Patient seen and examined at bedside, no acute events overnight, complaining of back pain but well-controlled.  Also now complaining of B/L anterior knee/shin burning pain that is worse than yesterday.     Denies any nausea/vomiting, fevers/chills, chest pain, or SOB.    Vital Signs Last 24 Hrs  T(C): 38 (15 Dec 2022 06:07), Max: 38.2 (15 Dec 2022 01:58)  T(F): 100.4 (15 Dec 2022 06:07), Max: 100.7 (15 Dec 2022 01:58)  HR: 87 (15 Dec 2022 06:07) (68 - 90)  BP: 135/52 (15 Dec 2022 06:07) (114/39 - 135/52)  BP(mean): --  RR: 17 (15 Dec 2022 06:07) (16 - 18)  SpO2: 96% (15 Dec 2022 06:07) (95% - 100%)    Parameters below as of 15 Dec 2022 06:07  Patient On (Oxygen Delivery Method): room air        Physical exam:  Gen: NAD, resting in bed  Back: Dressing Clean/Dry/Intact, HV intact with SS output  LLE: L3-S1 motor 5/5                     SILT L2-S1                     foot wwp, cap refill <2s  RLE: L3-S1 motor 5/5                    SILT L2-S1                     foot wwp, cap refill <2s    Labs:                          10.3   6.92  )-----------( 113      ( 14 Dec 2022 06:20 )             31.4       12-14    136  |  105  |  22  ----------------------------<  147<H>  4.2   |  24  |  0.73    Ca    8.6      14 Dec 2022 06:20        87yMale s/p L3-5 lami + L4-5 instrumented PSF on 12/13/22, now POD#2    -WBAT  -monitor drain output, will remove when output decreases  -remove parra this morning  -pain control  -incentive spirometry  -DVT ppx: ambulation/SCDs  -PT/OT  -dispo planning- home with home PT

## 2022-12-15 NOTE — DISCHARGE NOTE NURSING/CASE MANAGEMENT/SOCIAL WORK - NSDPDISTO_GEN_ALL_CORE
Pt incision with dressing intact, positive NV status. VS stable Afebrile. pt danny po diet, voiding without difficulty./Home

## 2022-12-15 NOTE — DISCHARGE NOTE NURSING/CASE MANAGEMENT/SOCIAL WORK - NSDCPNINST_GEN_ALL_CORE
You have a Post-op Follow-Up appt scheduled with Dr. Gallegos on December 28th, 2022 at 12:45 PM.  Maintain back incision and dressing clean dry and intact. Call MD with any signs of infection ie fever, redness, drainage, or with signs of bleeding, unrelieved increased pain, or persistent nausea. Avoid twisting motion and remember to logroll to turn in bed. Continue to drink plenty of fluids. Avoid strenuous activity and constipation which may be a side effect from taking certain medications and narcotics. No heavy lifting greater than 10 pounds, ie. a gallon of milk. Safety and fall prevention measures reinforced. Follow-up with MD in office as instructed.

## 2022-12-16 LAB — SURGICAL PATHOLOGY STUDY: SIGNIFICANT CHANGE UP

## 2022-12-16 PROCEDURE — 99233 SBSQ HOSP IP/OBS HIGH 50: CPT

## 2022-12-16 RX ORDER — SODIUM CHLORIDE 9 MG/ML
1000 INJECTION, SOLUTION INTRAVENOUS ONCE
Refills: 0 | Status: COMPLETED | OUTPATIENT
Start: 2022-12-16 | End: 2022-12-16

## 2022-12-16 RX ADMIN — Medication 1 TABLET(S): at 13:21

## 2022-12-16 RX ADMIN — Medication 975 MILLIGRAM(S): at 05:34

## 2022-12-16 RX ADMIN — SENNA PLUS 2 TABLET(S): 8.6 TABLET ORAL at 21:21

## 2022-12-16 RX ADMIN — SODIUM CHLORIDE 1000 MILLILITER(S): 9 INJECTION, SOLUTION INTRAVENOUS at 10:44

## 2022-12-16 RX ADMIN — Medication 975 MILLIGRAM(S): at 13:23

## 2022-12-16 RX ADMIN — Medication 1000 UNIT(S): at 13:21

## 2022-12-16 RX ADMIN — ATORVASTATIN CALCIUM 40 MILLIGRAM(S): 80 TABLET, FILM COATED ORAL at 21:21

## 2022-12-16 RX ADMIN — Medication 5 MILLIGRAM(S): at 05:34

## 2022-12-16 RX ADMIN — Medication 100 MICROGRAM(S): at 05:34

## 2022-12-16 RX ADMIN — Medication 6 MILLIGRAM(S): at 05:34

## 2022-12-16 RX ADMIN — Medication 975 MILLIGRAM(S): at 21:21

## 2022-12-16 RX ADMIN — Medication 6 MILLIGRAM(S): at 00:05

## 2022-12-16 RX ADMIN — Medication 81 MILLIGRAM(S): at 13:21

## 2022-12-16 RX ADMIN — Medication 6 MILLIGRAM(S): at 13:22

## 2022-12-16 RX ADMIN — Medication 5 MILLIGRAM(S): at 17:51

## 2022-12-16 RX ADMIN — TAMSULOSIN HYDROCHLORIDE 0.8 MILLIGRAM(S): 0.4 CAPSULE ORAL at 21:21

## 2022-12-16 NOTE — PROGRESS NOTE ADULT - SUBJECTIVE AND OBJECTIVE BOX
ORTHOPEDIC PROGRESS NOTE     No acute events overnight, complaining of back pain and B/L anterior knee/shin burning pain that was partially relieved by Decadron.    Vital Signs Last 24 Hrs  T(C): 36.6 (16 Dec 2022 01:57), Max: 38 (15 Dec 2022 06:07)  T(F): 97.9 (16 Dec 2022 01:57), Max: 100.4 (15 Dec 2022 06:07)  HR: 62 (16 Dec 2022 01:57) (62 - 91)  BP: 126/46 (16 Dec 2022 01:57) (122/55 - 138/50)  RR: 16 (16 Dec 2022 01:57) (16 - 18)  SpO2: 95% (16 Dec 2022 01:57) (95% - 99%)  Parameters below as of 16 Dec 2022 01:57  Patient On (Oxygen Delivery Method): room air    Physical exam:  Gen: NAD, resting in bed  Back: Dressing Clean/Dry/Intact, HV intact with SS output  LLE: L3-S1 motor 5/5                     SILT L2-S1                     foot wwp, cap refill <2s  RLE: L3-S1 motor 5/5                    SILT L2-S1                     foot wwp, cap refill <2s    Labs:                          10.3   6.92  )-----------( 113      ( 14 Dec 2022 06:20 )             31.4   12-14    136  |  105  |  22  ----------------------------<  147<H>  4.2   |  24  |  0.73    Ca    8.6      14 Dec 2022 06:20      87yMale s/p L3-5 lami + L4-5 instrumented PSF on 12/13/22.    -WBAT  -monitor drain output, will remove when output decreases  -pain control  -incentive spirometry  -DVT ppx: ambulation/SCDs  -PT/OT  -dispo planning- home with home PT

## 2022-12-16 NOTE — PROGRESS NOTE ADULT - SUBJECTIVE AND OBJECTIVE BOX
[facility] Division of Hospital Medicine  Hilary Sierra MD  Pager (M-F, 8A-5P):  In-house pager [phone]; Long-range pager [phone]  Other Times:  Please page Hospitalist in Charge -  In-house pager [phone]    Patient is a 87y old  Male who presents with a chief complaint of L3-5 lami/ L4-5 PSF 12/13/22 (14 Dec 2022 13:35)    SUBJECTIVE / OVERNIGHT EVENTS:  No problems reported over night. Pt seen earlier, resting in bed, doing well. Pain at surgical site as expected, adequately controlled with meds. Pain down legs improved with steroids. No chest pain, SOB, nausea, vomiting. Eager to return home. Has family around to help him out.  ADDITIONAL REVIEW OF SYSTEMS:    MEDICATIONS  (STANDING):  acetaminophen     Tablet .. 975 milliGRAM(s) Oral every 8 hours  aspirin enteric coated 81 milliGRAM(s) Oral daily  atorvastatin 40 milliGRAM(s) Oral at bedtime  cholecalciferol 1000 Unit(s) Oral daily  lactated ringers. 1000 milliLiter(s) (125 mL/Hr) IV Continuous <Continuous>  levothyroxine 100 MICROGram(s) Oral daily  multivitamin 1 Tablet(s) Oral daily  oxybutynin 5 milliGRAM(s) Oral two times a day  polyethylene glycol 3350 17 Gram(s) Oral daily  senna 2 Tablet(s) Oral at bedtime  tamsulosin 0.8 milliGRAM(s) Oral at bedtime    MEDICATIONS  (PRN):  cyclobenzaprine 10 milliGRAM(s) Oral every 8 hours PRN Muscle Spasm  oxyCODONE    IR 5 milliGRAM(s) Oral every 4 hours PRN Moderate Pain (4 - 6)  oxyCODONE    IR 10 milliGRAM(s) Oral every 4 hours PRN Severe Pain (7 - 10)  traMADol 50 milliGRAM(s) Oral every 6 hours PRN Mild Pain (1 - 3)    I&O's Summary    15 Dec 2022 07:01  -  16 Dec 2022 07:00  --------------------------------------------------------  IN: 0 mL / OUT: 1443 mL / NET: -1443 mL    16 Dec 2022 07:01  -  16 Dec 2022 14:55  --------------------------------------------------------  IN: 0 mL / OUT: 33 mL / NET: -33 mL    PHYSICAL EXAM:  Vital Signs Last 24 Hrs  T(C): 37 (16 Dec 2022 13:42), Max: 37.4 (15 Dec 2022 17:28)  T(F): 98.6 (16 Dec 2022 13:42), Max: 99.4 (15 Dec 2022 17:28)  HR: 61 (16 Dec 2022 13:42) (61 - 84)  BP: 119/41 (16 Dec 2022 13:42) (111/39 - 137/49)  BP(mean): --  RR: 18 (16 Dec 2022 13:42) (16 - 18)  SpO2: 94% (16 Dec 2022 13:42) (94% - 99%)    Parameters below as of 16 Dec 2022 13:42  Patient On (Oxygen Delivery Method): room air    GENERAL: NAD, well-groomed, well-developed  RESPIRATORY: Clear to auscultation bilaterally; No rales, rhonchi, wheezing, or rubs  CARDIOVASCULAR: Regular rate and rhythm; No murmurs, rubs, or gallops  GASTROINTESTINAL: Soft, Nontender, Nondistended; Bowel sounds present  EXTREMITIES:  2+ Peripheral Pulses, No clubbing, cyanosis, or edema  SKIN: No rashes or lesions; dressing C/D/I; +HV  condom cath    LABS:    RADIOLOGY & ADDITIONAL TESTS:  Results Reviewed:   Imaging Personally Reviewed:  Electrocardiogram Personally Reviewed:    COORDINATION OF CARE:  Care Discussed with Consultants/Other Providers [Y/N]: ortho re overall care  Prior or Outpatient Records Reviewed [Y/N]:

## 2022-12-17 VITALS — DIASTOLIC BLOOD PRESSURE: 40 MMHG | SYSTOLIC BLOOD PRESSURE: 124 MMHG

## 2022-12-17 PROCEDURE — 99232 SBSQ HOSP IP/OBS MODERATE 35: CPT

## 2022-12-17 RX ORDER — SENNA PLUS 8.6 MG/1
2 TABLET ORAL
Qty: 28 | Refills: 0
Start: 2022-12-17 | End: 2022-12-30

## 2022-12-17 RX ORDER — PANTOPRAZOLE SODIUM 20 MG/1
1 TABLET, DELAYED RELEASE ORAL
Qty: 14 | Refills: 0
Start: 2022-12-17 | End: 2022-12-30

## 2022-12-17 RX ORDER — OXYCODONE HYDROCHLORIDE 5 MG/1
1 TABLET ORAL
Qty: 42 | Refills: 0
Start: 2022-12-17 | End: 2022-12-23

## 2022-12-17 RX ORDER — TRAMADOL HYDROCHLORIDE 50 MG/1
1 TABLET ORAL
Qty: 42 | Refills: 0
Start: 2022-12-17 | End: 2022-12-30

## 2022-12-17 RX ORDER — CYCLOBENZAPRINE HYDROCHLORIDE 10 MG/1
1 TABLET, FILM COATED ORAL
Qty: 42 | Refills: 0
Start: 2022-12-17 | End: 2022-12-30

## 2022-12-17 RX ORDER — TRAMADOL HYDROCHLORIDE 50 MG/1
1 TABLET ORAL
Qty: 0 | Refills: 0 | DISCHARGE

## 2022-12-17 RX ORDER — NALOXONE HYDROCHLORIDE 4 MG/.1ML
4 SPRAY NASAL
Qty: 2 | Refills: 0
Start: 2022-12-17

## 2022-12-17 RX ORDER — POLYETHYLENE GLYCOL 3350 17 G/17G
17 POWDER, FOR SOLUTION ORAL
Qty: 238 | Refills: 0
Start: 2022-12-17 | End: 2022-12-30

## 2022-12-17 RX ORDER — DICLOFENAC SODIUM 75 MG/1
1 TABLET, DELAYED RELEASE ORAL
Qty: 0 | Refills: 0 | DISCHARGE

## 2022-12-17 RX ORDER — ACETAMINOPHEN 500 MG
3 TABLET ORAL
Qty: 126 | Refills: 0
Start: 2022-12-17 | End: 2022-12-30

## 2022-12-17 RX ORDER — TIZANIDINE 4 MG/1
1 TABLET ORAL
Qty: 42 | Refills: 0
Start: 2022-12-17 | End: 2022-12-30

## 2022-12-17 RX ADMIN — Medication 1 TABLET(S): at 12:26

## 2022-12-17 RX ADMIN — Medication 100 MICROGRAM(S): at 05:27

## 2022-12-17 RX ADMIN — Medication 1000 UNIT(S): at 12:26

## 2022-12-17 RX ADMIN — Medication 81 MILLIGRAM(S): at 12:25

## 2022-12-17 RX ADMIN — Medication 5 MILLIGRAM(S): at 05:27

## 2022-12-17 RX ADMIN — Medication 975 MILLIGRAM(S): at 05:26

## 2022-12-17 RX ADMIN — Medication 975 MILLIGRAM(S): at 14:08

## 2022-12-17 NOTE — PROGRESS NOTE ADULT - PROBLEM SELECTOR PLAN 3
-likely secondary to post-op changes; pt hemodynamically stable
-likely secondary to post-op changes; pt hemodynamically stable
-likely secondary to post-op changes; pt hemodynamically stable; will monitor CBC in am.

## 2022-12-17 NOTE — PROGRESS NOTE ADULT - PROBLEM SELECTOR PLAN 1
-likely non-infectious 2/2 atelectasis  -will encourage incentive spirometer use  -no WBC or signs of infection at this time  no temp>48h
-likely non-infectious 2/2 atelectasis  -will encourage incentive spirometer use  -no WBC or signs of infection at this time  no temp>24h
-likely non-infectious 2/2 atelectasis  -will encourage incentive spirometer use  -no WBC or signs of infection at this time  -will continue to monitor closely

## 2022-12-17 NOTE — PROGRESS NOTE ADULT - PROBLEM SELECTOR PLAN 2
-Pain well controlled; continue management and pain control per ortho recs with tylenol tid, oxycodone IR prn and tramadol prn; steroids per ortho  -c/w bowel regimen  -c/w incentive spirometer use  -c/w PT and f/u PT recs  -HV per ortho
-Pain well controlled; continue management and pain control per ortho recs with tylenol tid, oxycodone IR prn and tramadol prn  -c/w bowel regimen  -c/w incentive spirometer use  -c/w PT and f/u PT recs  -monitor HV  -parra in place - f/u with urology.
-Pain well controlled; continue management and pain control per ortho recs with tylenol tid, oxycodone IR prn and tramadol prn; steroids per ortho  -c/w bowel regimen  -c/w incentive spirometer use  -c/w PT and f/u PT recs  -monitor HV

## 2022-12-17 NOTE — PROGRESS NOTE ADULT - PROBLEM SELECTOR PLAN 8
-c/w teds/figueroa per orthopedic protocol

## 2022-12-17 NOTE — PROGRESS NOTE ADULT - ASSESSMENT
87M h/o BPH, HLP, hypothyroid, CAD s/p CABG x5 (2006) and cardiac stents x2 (06/2021), prostate cancer s/p radiation therapy and immunotherapy, JOSH, hypothyroid and borderline DM s/p L3-L5 Laminectomy and L4-L5 fusion on 12/13, course c/b post-op fever.
87M h/o BPH, HLP, hypothyroid, CAD s/p CABG x5 (2006) and cardiac stents x2 (06/2021), prostate cancer s/p radiation therapy and immunotherapy, JOSH, hypothyroid and borderline DM s/p L3-L5 Laminectomy and L4-L5 fusion on 12/13
87M h/o BPH, HLP, hypothyroid, CAD s/p CABG x5 (2006) and cardiac stents x2 (06/2021), prostate cancer s/p radiation therapy and immunotherapy, JOSH, hypothyroid and borderline DM s/p L3-L5 Laminectomy and L4-L5 fusion POD#2 course c/b post-op fever.

## 2022-12-17 NOTE — PROGRESS NOTE ADULT - PROBLEM SELECTOR PROBLEM 2
S/P laminectomy with spinal fusion

## 2022-12-17 NOTE — PROGRESS NOTE ADULT - SUBJECTIVE AND OBJECTIVE BOX
[facility] Division of Hospital Medicine  Hilary Sierra MD  Pager (M-F, 8A-5P):  In-house pager [phone]; Long-range pager [phone]  Other Times:  Please page Hospitalist in Charge -  In-house pager [phone]    Patient is a 87y old  Male who presents with a chief complaint of surgery (16 Dec 2022 14:54)      SUBJECTIVE / OVERNIGHT EVENTS:  No problems reported over night. Doing ok overall. Family at bedside. Pt eager to go home. Worked with PT without any problems. Pain controlled with meds.   ADDITIONAL REVIEW OF SYSTEMS:    MEDICATIONS  (STANDING):  acetaminophen     Tablet .. 975 milliGRAM(s) Oral every 8 hours  aspirin enteric coated 81 milliGRAM(s) Oral daily  atorvastatin 40 milliGRAM(s) Oral at bedtime  cholecalciferol 1000 Unit(s) Oral daily  lactated ringers. 1000 milliLiter(s) (125 mL/Hr) IV Continuous <Continuous>  levothyroxine 100 MICROGram(s) Oral daily  multivitamin 1 Tablet(s) Oral daily  oxybutynin 5 milliGRAM(s) Oral two times a day  polyethylene glycol 3350 17 Gram(s) Oral daily  senna 2 Tablet(s) Oral at bedtime  tamsulosin 0.8 milliGRAM(s) Oral at bedtime    MEDICATIONS  (PRN):  cyclobenzaprine 10 milliGRAM(s) Oral every 8 hours PRN Muscle Spasm  oxyCODONE    IR 5 milliGRAM(s) Oral every 4 hours PRN Moderate Pain (4 - 6)  oxyCODONE    IR 10 milliGRAM(s) Oral every 4 hours PRN Severe Pain (7 - 10)  traMADol 50 milliGRAM(s) Oral every 6 hours PRN Mild Pain (1 - 3)      CAPILLARY BLOOD GLUCOSE        I&O's Summary    16 Dec 2022 07:01  -  17 Dec 2022 07:00  --------------------------------------------------------  IN: 0 mL / OUT: 2018 mL / NET: -2018 mL        PHYSICAL EXAM:  Vital Signs Last 24 Hrs  T(C): 36.9 (17 Dec 2022 10:20), Max: 37.1 (16 Dec 2022 21:26)  T(F): 98.4 (17 Dec 2022 10:20), Max: 98.7 (16 Dec 2022 21:26)  HR: 79 (17 Dec 2022 10:20) (54 - 79)  BP: 124/40 (17 Dec 2022 10:39) (119/41 - 137/37)  BP(mean): --  RR: 18 (17 Dec 2022 10:20) (17 - 18)  SpO2: 95% (17 Dec 2022 10:20) (94% - 96%)    Parameters below as of 17 Dec 2022 10:20  Patient On (Oxygen Delivery Method): room air    GENERAL: NAD, sitting up on couch  RESPIRATORY: Clear to auscultation bilaterally; No rales, rhonchi, wheezing, or rubs  CARDIOVASCULAR: Regular rate and rhythm; No murmurs, rubs, or gallops  GASTROINTESTINAL: Soft, Nontender, Nondistended; Bowel sounds present  EXTREMITIES:  2+ Peripheral Pulses, No clubbing, cyanosis, or edema  SKIN: No rashes or lesion  back brace on    LABS:      RADIOLOGY & ADDITIONAL TESTS:  Results Reviewed:   Imaging Personally Reviewed:  Electrocardiogram Personally Reviewed:    COORDINATION OF CARE:  Care Discussed with Consultants/Other Providers [Y/N]: ortho re overall care  Prior or Outpatient Records Reviewed [Y/N]:

## 2022-12-17 NOTE — PROGRESS NOTE ADULT - SUBJECTIVE AND OBJECTIVE BOX
ORTHO PROGRESS NOTE     No acute overnight events. Pt resting comfortably without complaint. Pain controlled  Walking with Physical Therapy     Vital Signs Last 24 Hrs  T(C): 36.7 (17 Dec 2022 05:32), Max: 37.1 (16 Dec 2022 21:26)  T(F): 98.1 (17 Dec 2022 05:32), Max: 98.7 (16 Dec 2022 21:26)  HR: 55 (17 Dec 2022 05:32) (54 - 61)  BP: 121/39 (17 Dec 2022 05:32) (111/39 - 137/37)  BP(mean): --  RR: 18 (17 Dec 2022 05:32) (17 - 18)  SpO2: 96% (17 Dec 2022 05:32) (94% - 96%)    Parameters below as of 17 Dec 2022 05:32  Patient On (Oxygen Delivery Method): room air        Back: Dressing/ Incision Clean/Dry/Intact,  HV intact with SS output  Motor:                   C5                C6              C7               C8           T1   R            5/5                5/5            5/5             5/5          5/5  L             5/5               5/5             5/5             5/5          5/5                L2             L3             L4               L5            S1  R         5/5           5/5          5/5             5/5           5/5  L          5/5          5/5           5/5             5/5           5/5    Sensory:            C5         C6         C7      C8       T1        (0=absent, 1=impaired, 2=normal, NT=not testable)  R         2            2           2        2         2  L          2            2           2        2         2               L2          L3         L4      L5       S1         (0=absent, 1=impaired, 2=normal, NT=not testable)  R         2            2            2        2        2  L          2            2           2        2         2      87yMale s/p L3-5 lami + L4-5 instrumented PSF on 12/13/22.    -WBAT  -monitor drain output, will remove when output decreases  -pain control  -incentive spirometry  -DVT ppx: ambulation/SCDs  -PT/OT  -dispo planning- home with home PT

## 2022-12-19 RX ORDER — METHYLPREDNISOLONE 4 MG/1
4 TABLET ORAL
Qty: 1 | Refills: 0 | Status: ACTIVE | COMMUNITY
Start: 2022-12-19 | End: 1900-01-01

## 2022-12-28 ENCOUNTER — APPOINTMENT (OUTPATIENT)
Dept: ORTHOPEDIC SURGERY | Facility: CLINIC | Age: 87
End: 2022-12-28
Payer: MEDICARE

## 2022-12-28 VITALS — HEIGHT: 68 IN | BODY MASS INDEX: 25.91 KG/M2 | WEIGHT: 171 LBS

## 2022-12-28 PROBLEM — C61 MALIGNANT NEOPLASM OF PROSTATE: Chronic | Status: ACTIVE | Noted: 2022-11-28

## 2022-12-28 PROBLEM — Z92.3 PERSONAL HISTORY OF IRRADIATION: Chronic | Status: ACTIVE | Noted: 2022-11-28

## 2022-12-28 PROBLEM — R73.03 PREDIABETES: Chronic | Status: ACTIVE | Noted: 2022-11-28

## 2022-12-28 PROBLEM — Z92.89 PERSONAL HISTORY OF OTHER MEDICAL TREATMENT: Chronic | Status: ACTIVE | Noted: 2022-11-28

## 2022-12-28 PROCEDURE — 99024 POSTOP FOLLOW-UP VISIT: CPT

## 2022-12-28 PROCEDURE — 72100 X-RAY EXAM L-S SPINE 2/3 VWS: CPT

## 2022-12-28 NOTE — PHYSICAL EXAM
[Stooped] : stooped [Walker] : ambulates with walker [Antalgic] : not antalgic [de-identified] : His incision is healing well.  Stable neurologic exam. [de-identified] : Review of his lumbar spine MRI reveals moderate to severe stenosis from L3-5 with L4-5 spondylolisthesis\par \par AP lateral lumbar x-rays reveals instrumented L4-5 fusion.

## 2022-12-28 NOTE — HISTORY OF PRESENT ILLNESS
[de-identified] : Mr. ELIJAH NOLAN  is a 87 year old male who presents s/p L3-5 laminectomy and L4-5 fusion on 12/13/22.  Leg pain is gone.

## 2022-12-28 NOTE — DISCUSSION/SUMMARY
[de-identified] : Overall he is recovering well.  Restrictions were reviewed.  Follow-up in 2 weeks for suture removal.

## 2023-01-06 ENCOUNTER — TRANSCRIPTION ENCOUNTER (OUTPATIENT)
Age: 88
End: 2023-01-06

## 2023-01-11 ENCOUNTER — APPOINTMENT (OUTPATIENT)
Dept: ORTHOPEDIC SURGERY | Facility: CLINIC | Age: 88
End: 2023-01-11
Payer: MEDICARE

## 2023-01-11 PROCEDURE — 99024 POSTOP FOLLOW-UP VISIT: CPT

## 2023-01-11 RX ORDER — TRAMADOL HYDROCHLORIDE 50 MG/1
50 TABLET, COATED ORAL EVERY 6 HOURS
Qty: 40 | Refills: 0 | Status: ACTIVE | COMMUNITY
Start: 2023-01-11 | End: 1900-01-01

## 2023-01-11 NOTE — PHYSICAL EXAM
[Antalgic] : not antalgic [Stooped] : not stooped [de-identified] : His incision is healing well.  Stable neurologic exam. [de-identified] : Review of his lumbar spine MRI reveals moderate to severe stenosis from L3-5 with L4-5 spondylolisthesis\par \par AP lateral lumbar x-rays reveals instrumented L4-5 fusion.

## 2023-01-11 NOTE — HISTORY OF PRESENT ILLNESS
[de-identified] : Mr. ELIJAH NOLAN  is a 87 year old male who presents s/p L3-5 laminectomy and L4-5 fusion on 12/13/22.  Leg pain is gone.  He is here for suture removal.

## 2023-01-11 NOTE — DISCUSSION/SUMMARY
[de-identified] : He continues to recover well.  Restrictions were reviewed.  Follow-up in 4 weeks

## 2023-02-10 ENCOUNTER — APPOINTMENT (OUTPATIENT)
Dept: ORTHOPEDIC SURGERY | Facility: CLINIC | Age: 88
End: 2023-02-10
Payer: MEDICARE

## 2023-02-10 VITALS
TEMPERATURE: 97.2 F | DIASTOLIC BLOOD PRESSURE: 70 MMHG | SYSTOLIC BLOOD PRESSURE: 108 MMHG | OXYGEN SATURATION: 95 % | HEART RATE: 92 BPM | WEIGHT: 171 LBS | HEIGHT: 68 IN | BODY MASS INDEX: 25.91 KG/M2

## 2023-02-10 PROCEDURE — 99024 POSTOP FOLLOW-UP VISIT: CPT

## 2023-02-10 PROCEDURE — 72100 X-RAY EXAM L-S SPINE 2/3 VWS: CPT

## 2023-02-10 NOTE — DISCUSSION/SUMMARY
[de-identified] : Overall he is happy with his progress.  Restrictions were again reviewed.  Follow-up in 6 weeks.
No

## 2023-02-10 NOTE — PHYSICAL EXAM
[Cane] : ambulates with cane [Antalgic] : not antalgic [Stooped] : not stooped [de-identified] : Incision is healed.  Stable neurologic exam. [de-identified] : Review of his lumbar spine MRI reveals moderate to severe stenosis from L3-5 with L4-5 spondylolisthesis\par \par AP lateral lumbar x-rays reveals instrumented L4-5 fusion.

## 2023-02-10 NOTE — HISTORY OF PRESENT ILLNESS
[de-identified] : Mr. ELIJAH NOLAN  is a 87 year old male who presents s/p L3-5 laminectomy and L4-5 fusion on 12/13/22.  Leg pain is gone.  Overall, he is doing well.

## 2023-02-16 ENCOUNTER — NON-APPOINTMENT (OUTPATIENT)
Age: 88
End: 2023-02-16

## 2023-03-22 ENCOUNTER — APPOINTMENT (OUTPATIENT)
Dept: ORTHOPEDIC SURGERY | Facility: CLINIC | Age: 88
End: 2023-03-22
Payer: MEDICARE

## 2023-03-22 PROCEDURE — 72100 X-RAY EXAM L-S SPINE 2/3 VWS: CPT

## 2023-03-22 PROCEDURE — 99214 OFFICE O/P EST MOD 30 MIN: CPT

## 2023-03-22 NOTE — HISTORY OF PRESENT ILLNESS
[de-identified] : Mr. ELIJAH NOLAN  is a 87 year old male who presents s/p L3-5 laminectomy and L4-5 fusion on 12/13/22.  Leg pain is gone.  Overall, he is doing well.

## 2023-03-22 NOTE — DISCUSSION/SUMMARY
[de-identified] : He continues to improve.  He is happy with his progress.  He will gradually increase his activities.  General back restrictions were reviewed.  Follow-up in 3 months time or sooner with any changes or worsening of his symptoms.

## 2023-04-24 NOTE — ED ADULT NURSE NOTE - NS ED NURSE LEVEL OF CONSCIOUSNESS AFFECT
The pt called and stated that she is having pain on the outside of her right thigh. She states the site does not appear red or warm to the touch. She states the pain precipitates as a burning sensation under the skin and is aggravated by palpation. She states it is not that painful, 5-6/10 at its worse but she is concerned about it.     Also, the pt stated that she has right lower abdominal pain. She has had this before in the past and nothing was found in relation to abdominal issues. However, a possible Hemangioma was noted on her liver from her CT of Abdomen and Pelvis. She states the pain usually occurs after her menstrual cycle but now it seems to more constant. The pt uses the    Calm

## 2023-06-28 ENCOUNTER — APPOINTMENT (OUTPATIENT)
Dept: ORTHOPEDIC SURGERY | Facility: CLINIC | Age: 88
End: 2023-06-28
Payer: MEDICARE

## 2023-06-28 VITALS — BODY MASS INDEX: 25.91 KG/M2 | WEIGHT: 171 LBS | HEIGHT: 68 IN

## 2023-06-28 DIAGNOSIS — M48.07 SPINAL STENOSIS, LUMBOSACRAL REGION: ICD-10-CM

## 2023-06-28 DIAGNOSIS — M54.16 RADICULOPATHY, LUMBAR REGION: ICD-10-CM

## 2023-06-28 PROCEDURE — 99214 OFFICE O/P EST MOD 30 MIN: CPT

## 2023-06-28 PROCEDURE — 72100 X-RAY EXAM L-S SPINE 2/3 VWS: CPT

## 2023-06-28 NOTE — PHYSICAL EXAM
[Cane] : ambulates with cane [Antalgic] : not antalgic [Stooped] : not stooped [de-identified] : Incision is healed.  Stable neurologic exam. [de-identified] : Review of his lumbar spine MRI reveals moderate to severe stenosis from L3-5 with L4-5 spondylolisthesis\par \par AP lateral lumbar x-rays reveals instrumented L4-5 fusion.  Improved posterior lateral arthrodesis.

## 2023-06-28 NOTE — DISCUSSION/SUMMARY
[de-identified] : We discussed further treatment options.  He continues to report significant improvement in the nerve related symptoms that pushed him towards surgery.  He is happy with that.  He does complain of some general arthralgias.  He would like to try some physical therapy.  Prescription was given.  Follow-up in 3 months time or sooner with any changes worsening of his symptoms.

## 2023-06-28 NOTE — HISTORY OF PRESENT ILLNESS
[de-identified] : Mr. ELIJAH NOLAN  is a 87 year old male who presents s/p L3-5 laminectomy and L4-5 fusion on 12/13/22.  Leg pain is gone.  He feels some pain related to generalized body arthritis.

## 2023-07-13 ENCOUNTER — NON-APPOINTMENT (OUTPATIENT)
Age: 88
End: 2023-07-13

## 2023-09-27 ENCOUNTER — APPOINTMENT (OUTPATIENT)
Dept: ORTHOPEDIC SURGERY | Facility: CLINIC | Age: 88
End: 2023-09-27
Payer: MEDICARE

## 2023-09-27 VITALS — HEIGHT: 68 IN | WEIGHT: 165 LBS | BODY MASS INDEX: 25.01 KG/M2

## 2023-09-27 PROCEDURE — 99214 OFFICE O/P EST MOD 30 MIN: CPT

## 2023-09-27 PROCEDURE — 72100 X-RAY EXAM L-S SPINE 2/3 VWS: CPT

## 2023-11-06 ENCOUNTER — INPATIENT (INPATIENT)
Facility: HOSPITAL | Age: 88
LOS: 4 days | Discharge: HOME | DRG: 481 | End: 2023-11-11
Attending: ORTHOPAEDIC SURGERY | Admitting: ORTHOPAEDIC SURGERY
Payer: MEDICARE

## 2023-11-06 ENCOUNTER — TRANSCRIPTION ENCOUNTER (OUTPATIENT)
Age: 88
End: 2023-11-06

## 2023-11-06 VITALS
HEIGHT: 68 IN | TEMPERATURE: 98 F | OXYGEN SATURATION: 100 % | RESPIRATION RATE: 18 BRPM | WEIGHT: 164.91 LBS | HEART RATE: 67 BPM | SYSTOLIC BLOOD PRESSURE: 156 MMHG | DIASTOLIC BLOOD PRESSURE: 81 MMHG

## 2023-11-06 DIAGNOSIS — Z95.1 PRESENCE OF AORTOCORONARY BYPASS GRAFT: Chronic | ICD-10-CM

## 2023-11-06 DIAGNOSIS — Z90.89 ACQUIRED ABSENCE OF OTHER ORGANS: Chronic | ICD-10-CM

## 2023-11-06 DIAGNOSIS — Z95.5 PRESENCE OF CORONARY ANGIOPLASTY IMPLANT AND GRAFT: Chronic | ICD-10-CM

## 2023-11-06 LAB
ALBUMIN SERPL ELPH-MCNC: 3.8 G/DL — SIGNIFICANT CHANGE UP (ref 3.3–5)
ALBUMIN SERPL ELPH-MCNC: 3.8 G/DL — SIGNIFICANT CHANGE UP (ref 3.3–5)
ALP SERPL-CCNC: 83 U/L — SIGNIFICANT CHANGE UP (ref 40–120)
ALP SERPL-CCNC: 83 U/L — SIGNIFICANT CHANGE UP (ref 40–120)
ALT FLD-CCNC: 39 U/L — SIGNIFICANT CHANGE UP (ref 10–45)
ALT FLD-CCNC: 39 U/L — SIGNIFICANT CHANGE UP (ref 10–45)
ANION GAP SERPL CALC-SCNC: 13 MMOL/L — SIGNIFICANT CHANGE UP (ref 5–17)
ANION GAP SERPL CALC-SCNC: 13 MMOL/L — SIGNIFICANT CHANGE UP (ref 5–17)
APTT BLD: 27.1 SEC — SIGNIFICANT CHANGE UP (ref 24.5–35.6)
APTT BLD: 27.1 SEC — SIGNIFICANT CHANGE UP (ref 24.5–35.6)
AST SERPL-CCNC: 31 U/L — SIGNIFICANT CHANGE UP (ref 10–40)
AST SERPL-CCNC: 31 U/L — SIGNIFICANT CHANGE UP (ref 10–40)
BASOPHILS # BLD AUTO: 0.02 K/UL — SIGNIFICANT CHANGE UP (ref 0–0.2)
BASOPHILS # BLD AUTO: 0.02 K/UL — SIGNIFICANT CHANGE UP (ref 0–0.2)
BASOPHILS NFR BLD AUTO: 0.3 % — SIGNIFICANT CHANGE UP (ref 0–2)
BASOPHILS NFR BLD AUTO: 0.3 % — SIGNIFICANT CHANGE UP (ref 0–2)
BILIRUB SERPL-MCNC: 0.3 MG/DL — SIGNIFICANT CHANGE UP (ref 0.2–1.2)
BILIRUB SERPL-MCNC: 0.3 MG/DL — SIGNIFICANT CHANGE UP (ref 0.2–1.2)
BLD GP AB SCN SERPL QL: NEGATIVE — SIGNIFICANT CHANGE UP
BLD GP AB SCN SERPL QL: NEGATIVE — SIGNIFICANT CHANGE UP
BUN SERPL-MCNC: 28 MG/DL — HIGH (ref 7–23)
BUN SERPL-MCNC: 28 MG/DL — HIGH (ref 7–23)
CALCIUM SERPL-MCNC: 9.6 MG/DL — SIGNIFICANT CHANGE UP (ref 8.4–10.5)
CALCIUM SERPL-MCNC: 9.6 MG/DL — SIGNIFICANT CHANGE UP (ref 8.4–10.5)
CHLORIDE SERPL-SCNC: 107 MMOL/L — SIGNIFICANT CHANGE UP (ref 96–108)
CHLORIDE SERPL-SCNC: 107 MMOL/L — SIGNIFICANT CHANGE UP (ref 96–108)
CO2 SERPL-SCNC: 21 MMOL/L — LOW (ref 22–31)
CO2 SERPL-SCNC: 21 MMOL/L — LOW (ref 22–31)
CREAT SERPL-MCNC: 0.81 MG/DL — SIGNIFICANT CHANGE UP (ref 0.5–1.3)
CREAT SERPL-MCNC: 0.81 MG/DL — SIGNIFICANT CHANGE UP (ref 0.5–1.3)
EGFR: 85 ML/MIN/1.73M2 — SIGNIFICANT CHANGE UP
EGFR: 85 ML/MIN/1.73M2 — SIGNIFICANT CHANGE UP
EOSINOPHIL # BLD AUTO: 0.04 K/UL — SIGNIFICANT CHANGE UP (ref 0–0.5)
EOSINOPHIL # BLD AUTO: 0.04 K/UL — SIGNIFICANT CHANGE UP (ref 0–0.5)
EOSINOPHIL NFR BLD AUTO: 0.6 % — SIGNIFICANT CHANGE UP (ref 0–6)
EOSINOPHIL NFR BLD AUTO: 0.6 % — SIGNIFICANT CHANGE UP (ref 0–6)
GLUCOSE SERPL-MCNC: 131 MG/DL — HIGH (ref 70–99)
GLUCOSE SERPL-MCNC: 131 MG/DL — HIGH (ref 70–99)
HCT VFR BLD CALC: 38.4 % — LOW (ref 39–50)
HCT VFR BLD CALC: 38.4 % — LOW (ref 39–50)
HGB BLD-MCNC: 12.7 G/DL — LOW (ref 13–17)
HGB BLD-MCNC: 12.7 G/DL — LOW (ref 13–17)
IMM GRANULOCYTES NFR BLD AUTO: 0.5 % — SIGNIFICANT CHANGE UP (ref 0–0.9)
IMM GRANULOCYTES NFR BLD AUTO: 0.5 % — SIGNIFICANT CHANGE UP (ref 0–0.9)
INR BLD: 1.11 RATIO — SIGNIFICANT CHANGE UP (ref 0.85–1.18)
INR BLD: 1.11 RATIO — SIGNIFICANT CHANGE UP (ref 0.85–1.18)
LYMPHOCYTES # BLD AUTO: 0.89 K/UL — LOW (ref 1–3.3)
LYMPHOCYTES # BLD AUTO: 0.89 K/UL — LOW (ref 1–3.3)
LYMPHOCYTES # BLD AUTO: 13.4 % — SIGNIFICANT CHANGE UP (ref 13–44)
LYMPHOCYTES # BLD AUTO: 13.4 % — SIGNIFICANT CHANGE UP (ref 13–44)
MCHC RBC-ENTMCNC: 31.4 PG — SIGNIFICANT CHANGE UP (ref 27–34)
MCHC RBC-ENTMCNC: 31.4 PG — SIGNIFICANT CHANGE UP (ref 27–34)
MCHC RBC-ENTMCNC: 33.1 GM/DL — SIGNIFICANT CHANGE UP (ref 32–36)
MCHC RBC-ENTMCNC: 33.1 GM/DL — SIGNIFICANT CHANGE UP (ref 32–36)
MCV RBC AUTO: 94.8 FL — SIGNIFICANT CHANGE UP (ref 80–100)
MCV RBC AUTO: 94.8 FL — SIGNIFICANT CHANGE UP (ref 80–100)
MONOCYTES # BLD AUTO: 0.64 K/UL — SIGNIFICANT CHANGE UP (ref 0–0.9)
MONOCYTES # BLD AUTO: 0.64 K/UL — SIGNIFICANT CHANGE UP (ref 0–0.9)
MONOCYTES NFR BLD AUTO: 9.7 % — SIGNIFICANT CHANGE UP (ref 2–14)
MONOCYTES NFR BLD AUTO: 9.7 % — SIGNIFICANT CHANGE UP (ref 2–14)
NEUTROPHILS # BLD AUTO: 5 K/UL — SIGNIFICANT CHANGE UP (ref 1.8–7.4)
NEUTROPHILS # BLD AUTO: 5 K/UL — SIGNIFICANT CHANGE UP (ref 1.8–7.4)
NEUTROPHILS NFR BLD AUTO: 75.5 % — SIGNIFICANT CHANGE UP (ref 43–77)
NEUTROPHILS NFR BLD AUTO: 75.5 % — SIGNIFICANT CHANGE UP (ref 43–77)
NRBC # BLD: 0 /100 WBCS — SIGNIFICANT CHANGE UP (ref 0–0)
NRBC # BLD: 0 /100 WBCS — SIGNIFICANT CHANGE UP (ref 0–0)
PLATELET # BLD AUTO: 156 K/UL — SIGNIFICANT CHANGE UP (ref 150–400)
PLATELET # BLD AUTO: 156 K/UL — SIGNIFICANT CHANGE UP (ref 150–400)
POTASSIUM SERPL-MCNC: 4.6 MMOL/L — SIGNIFICANT CHANGE UP (ref 3.5–5.3)
POTASSIUM SERPL-MCNC: 4.6 MMOL/L — SIGNIFICANT CHANGE UP (ref 3.5–5.3)
POTASSIUM SERPL-SCNC: 4.6 MMOL/L — SIGNIFICANT CHANGE UP (ref 3.5–5.3)
POTASSIUM SERPL-SCNC: 4.6 MMOL/L — SIGNIFICANT CHANGE UP (ref 3.5–5.3)
PROT SERPL-MCNC: 6.2 G/DL — SIGNIFICANT CHANGE UP (ref 6–8.3)
PROT SERPL-MCNC: 6.2 G/DL — SIGNIFICANT CHANGE UP (ref 6–8.3)
PROTHROM AB SERPL-ACNC: 11.6 SEC — SIGNIFICANT CHANGE UP (ref 9.5–13)
PROTHROM AB SERPL-ACNC: 11.6 SEC — SIGNIFICANT CHANGE UP (ref 9.5–13)
RBC # BLD: 4.05 M/UL — LOW (ref 4.2–5.8)
RBC # BLD: 4.05 M/UL — LOW (ref 4.2–5.8)
RBC # FLD: 13.9 % — SIGNIFICANT CHANGE UP (ref 10.3–14.5)
RBC # FLD: 13.9 % — SIGNIFICANT CHANGE UP (ref 10.3–14.5)
RH IG SCN BLD-IMP: POSITIVE — SIGNIFICANT CHANGE UP
RH IG SCN BLD-IMP: POSITIVE — SIGNIFICANT CHANGE UP
SODIUM SERPL-SCNC: 141 MMOL/L — SIGNIFICANT CHANGE UP (ref 135–145)
SODIUM SERPL-SCNC: 141 MMOL/L — SIGNIFICANT CHANGE UP (ref 135–145)
WBC # BLD: 6.62 K/UL — SIGNIFICANT CHANGE UP (ref 3.8–10.5)
WBC # BLD: 6.62 K/UL — SIGNIFICANT CHANGE UP (ref 3.8–10.5)
WBC # FLD AUTO: 6.62 K/UL — SIGNIFICANT CHANGE UP (ref 3.8–10.5)
WBC # FLD AUTO: 6.62 K/UL — SIGNIFICANT CHANGE UP (ref 3.8–10.5)

## 2023-11-06 PROCEDURE — 99285 EMERGENCY DEPT VISIT HI MDM: CPT | Mod: GC

## 2023-11-06 PROCEDURE — 73552 X-RAY EXAM OF FEMUR 2/>: CPT | Mod: 26,LT

## 2023-11-06 PROCEDURE — 71045 X-RAY EXAM CHEST 1 VIEW: CPT | Mod: 26

## 2023-11-06 PROCEDURE — 73502 X-RAY EXAM HIP UNI 2-3 VIEWS: CPT | Mod: 26,LT

## 2023-11-06 RX ORDER — MORPHINE SULFATE 50 MG/1
4 CAPSULE, EXTENDED RELEASE ORAL ONCE
Refills: 0 | Status: DISCONTINUED | OUTPATIENT
Start: 2023-11-06 | End: 2023-11-06

## 2023-11-06 RX ORDER — ACETAMINOPHEN 500 MG
1000 TABLET ORAL ONCE
Refills: 0 | Status: COMPLETED | OUTPATIENT
Start: 2023-11-06 | End: 2023-11-06

## 2023-11-06 RX ADMIN — MORPHINE SULFATE 4 MILLIGRAM(S): 50 CAPSULE, EXTENDED RELEASE ORAL at 23:48

## 2023-11-06 RX ADMIN — Medication 400 MILLIGRAM(S): at 20:12

## 2023-11-06 RX ADMIN — MORPHINE SULFATE 4 MILLIGRAM(S): 50 CAPSULE, EXTENDED RELEASE ORAL at 20:30

## 2023-11-06 RX ADMIN — Medication 1000 MILLIGRAM(S): at 20:30

## 2023-11-06 RX ADMIN — MORPHINE SULFATE 4 MILLIGRAM(S): 50 CAPSULE, EXTENDED RELEASE ORAL at 20:10

## 2023-11-06 NOTE — ED ADULT TRIAGE NOTE - WEIGHT METHOD
stated Rinvoq Counseling: I discussed with the patient the risks of Rinvoq therapy including but not limited to upper respiratory tract infections, shingles, cold sores, bronchitis, nausea, cough, fever, acne, and headache. Live vaccines should be avoided.  This medication has been linked to serious infections; higher rate of mortality; malignancy and lymphoproliferative disorders; major adverse cardiovascular events; thrombosis; thrombocytopenia, anemia, and neutropenia; lipid elevations; liver enzyme elevations; and gastrointestinal perforations.

## 2023-11-06 NOTE — ED PROVIDER NOTE - ATTENDING CONTRIBUTION TO CARE
Benigno Rodriguez DO: I have personally performed a face to face medical and diagnostic evaluation of the patient. I have discussed with and reviewed the Resident's and/or ACP's and/or Medical/PA/NP student's note and agree with the History, ROS, Physical Exam and MDM unless otherwise indicated. A brief summary of my personal evaluation and impression can be found below.     87M h/o BPH, HLP, hypothyroid, CAD s/p CABG x5 (2006) and cardiac stents x2 (06/2021), prostate cancer s/p radiation therapy and immunotherapy, JOSH, hypothyroid and borderline DM, L3L5 lamiectomy, L4L5 fusion bibems following a fall.  Patient states  that he was walking in his home when he slipped on a magazine that was on the floor–fell onto the linoleum floor.  Is endorsing pain to his left upper leg and hip.  Denies head strike or loss of consciousness any subsequent nausea or vomiting chest pain shortness of breath abdominal pain. Pt remembers entire event    CONSTITUTIONAL: uncomfortable appearing  SKIN: Warm dry, normal skin turgor  HEAD: NCAT  NECK: Supple; non tender. Full ROM.  CARD: RRR, no murmurs.  RESP: clear to ausculation b/l. No crackles or wheezing.  ABD: soft, non-tender, non-distended, no rebound or guarding.  MSK: LLE externally rotated, shortened, deformity over proximal lateral thigh  NEURO: normal motor. normal sensory. LLE rom limited 2/.2 pain  PSYCH: Cooperative, appropriate.    mechanical suspected femur/rib fracture, pain control, xrays, no concern for other injuries at this time despite distracting injury, will reassess once pain is better controlled, pt will require ortho consult and admission, pre-op labs sent

## 2023-11-06 NOTE — CONSULT NOTE ADULT - SUBJECTIVE AND OBJECTIVE BOX
88y Male community ambulatory with cane presents c/o L hip pain and inability to ambulate sp mechanical fall. Denies HS/LOC. Denies numbness/tingling. Denies fever/chills. Denies pain/injury elsewhere. Takes a81, only AC.    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], site unspecified    Atherosclerotic heart disease of native coronary artery without angina pectoris    Benign prostatic hyperplasia without lower urinary tract symptoms    Calcific tendinitis of right shoulder    Contact with and (suspected) exposure to covid-19    Dizziness and giddiness    Dorsalgia, unspecified    Dyspnea, unspecified    Encounter for follow-up examination after completed treatment for conditions other than malignant neoplasm    Gastro-esophageal reflux disease without esophagitis    Hypothyroidism, unspecified    Low back pain, unspecified    Malignant neoplasm of prostate    Myalgia, unspecified site    Nonrheumatic mitral (valve) insufficiency    Nonrheumatic pulmonary valve insufficiency    Nonrheumatic tricuspid (valve) insufficiency    Obstructive sleep apnea (adult) (pediatric)    Old myocardial infarction    Other amnesia    Other chronic pain    Other fecal abnormalities    Pain in unspecified joint    Palpitations    Personal history of irradiation    Personal history of malignant neoplasm of prostate    Personal history of other medical treatment    Polyosteoarthritis, unspecified    Prediabetes    Presence of aortocoronary bypass graft    Presence of coronary angioplasty implant and graft    Pure hypercholesterolemia, unspecified    Radiculopathy, lumbar region    Radiculopathy, lumbosacral region    Rheumatic mitral valve disease, unspecified    Spinal stenosis, lumbar region without neurogenic claudication    Spinal stenosis, lumbosacral region    Spondylolisthesis, lumbar region    Spondylosis without myelopathy or radiculopathy, lumbar region    Unspecified osteoarthritis, unspecified site    Ventricular premature depolarization    Family history of cerebrovascular accident (CVA) (Mother)    Family history of diabetes mellitus (Sibling)    MEWS Score    High cholesterol    Hypothyroid    Coronary artery disease involving native coronary artery of native heart, angina presence unspecified    Prostate cancer    S/P radiation therapy    S/P radiation therapy    History of immunotherapy    Obstructive sleep apnea    Borderline diabetes    No significant past surgical history    S/P CABG x 5    S/P tonsillectomy    S/P primary angioplasty with coronary stent    FALL    90+    [email or web address]      PAST MEDICAL & SURGICAL HISTORY:  High cholesterol      Hypothyroid      Coronary artery disease involving native coronary artery of native heart, angina presence unspecified      Prostate cancer      S/P radiation therapy      History of immunotherapy      Obstructive sleep apnea  pt reports PMH on CPAP in past, after losing weight he stopped using CPAP      Borderline diabetes      S/P CABG x 5      S/P tonsillectomy      S/P primary angioplasty with coronary stent  06/2021        MEDICATIONS  (STANDING):    Allergies    No Known Allergies    Intolerances    epinephrine (Other)                          12.7   6.62  )-----------( 156      ( 06 Nov 2023 20:27 )             38.4     06 Nov 2023 20:27    141    |  107    |  28     ----------------------------<  131    4.6     |  21     |  0.81     Ca    9.6        06 Nov 2023 20:27    TPro  6.2    /  Alb  3.8    /  TBili  0.3    /  DBili  x      /  AST  31     /  ALT  39     /  AlkPhos  83     06 Nov 2023 20:27    PT/INR - ( 06 Nov 2023 20:27 )   PT: 11.6 sec;   INR: 1.11 ratio         PTT - ( 06 Nov 2023 20:27 )  PTT:27.1 sec  Vital Signs Last 24 Hrs  T(C): 36.7 (11-06-23 @ 20:08), Max: 36.7 (11-06-23 @ 18:05)  T(F): 98 (11-06-23 @ 20:08), Max: 98 (11-06-23 @ 18:05)  HR: 70 (11-06-23 @ 20:08) (67 - 70)  BP: 152/76 (11-06-23 @ 20:08) (152/76 - 156/81)  BP(mean): --  RR: 20 (11-06-23 @ 20:08) (18 - 20)  SpO2: 99% (11-06-23 @ 20:08) (99% - 100%)    Imaging: XR demonstrates L hip IT fracture    Physical Exam  General: NAD, Alert, Awake and oriented  Neurologic: No gross deficits, moving all 4s.    Neck/C-Spine: FAROM. No bony TTP.  T/L Spine: No bony TTP, no palpable step-off.    LEFT LE: No open skin. Externally Rotated and shortened. No deformities or other signs of trauma at  knee, lower leg, ankle or foot. Full baseline painless ROM at ankle and toes with. deferred log-roll and heel strike. Unable to actively SLR. +TA/GS/EHL/FHL.  SILT toes L3-S1. 2+ DP/PT pulses with brisk cap refill distally. Compartments soft and compressible.     Secondary Assessment:  NC/AT, NTTP of clavicles, NTTP of C-spine,T-spine, or L-spine in the midline and paraspinal areas; NTTP of pelvis  LUE: NTTP of Shoulder, Elbow, Wrist, Hand; NT with AROM/PROM of Shoulder, Elbow, Wrist, Hand; AIN/PIN/Med/Uln/Msc/Rad/Ax intact  RUE: NTTP of Shoulder, Elbow, Wrist, Hand; NT with AROM/PROM of Shoulder, Elbow, Wrist, Hand; AIN/PIN/Med/Uln/Msc/Rad/Ax intact     RLE: Able to SLR, NT with Log Roll, NT with Heel Strike, NTTP of Hip, Knee, Ankle, Foot; NT with AROM/PROM of Hip, Knee, Ankle, Foot; Q/H/GSC/TA/EHL/FHL intact        A/P: 88y Male with L IT hip fracture  Plan for OR tomorrow for IMN  NPO after midnight   Hold chemical DVT ppx  FU medical / cardiac clearance   Pain control  NWB L LE, bedrest  FU labs/imaging  Medical clearance/optimization for OR  Will discuss with attending

## 2023-11-06 NOTE — ED ADULT TRIAGE NOTE - NS ED NURSE AMBULANCES
[address]  coding opportunities          Chart Reviewed number of suggestions sent to Provider: 3     Patients Insurance        Commercial Insurance: Krist[address]   [id number]  [id number] [facility]

## 2023-11-06 NOTE — ED PROVIDER NOTE - OBJECTIVE STATEMENT
87M h/o BPH, HLP, hypothyroid, CAD s/p CABG x5 (2006) and cardiac stents x2 (06/2021), prostate cancer s/p radiation therapy and immunotherapy, JOSH, hypothyroid and borderline DM, L3L5 lamiectomy, L4L5 fusion bibems following a fall.  Patient states  that he was walking in his home when he slipped on a magazine that was on the floor–fell onto the linoleum floor.  Is endorsing pain to his left upper leg and hip.  Denies head strike or loss of consciousness any subsequent nausea or vomiting chest pain shortness of breath abdominal pain.

## 2023-11-06 NOTE — ED PROVIDER NOTE - PHYSICAL EXAMINATION
PHYSICAL EXAM:  GEN: Awake alert uncomfortable appearing   HEAD: Atraumatic  EYES: Clear bilaterally, pupils equal, round and reactive to light.  ENMT: Airway patent, Nasal mucosa clear. Mouth with normal mucosa. Uvula is midline.   CARDIAC: Normal rate, regular rhythm. +S1/S2. No murmurs, rubs or gallops.  RESPIRATORY: Breathing unlabored. Breath sounds clear and equal bilaterally.  ABDOMEN:  Soft, nontender, nondistended. No rebound tenderness or guarding.  LLE: ttp over proximal femur, leg is shortened and externally rotated   NEUROLOGICAL: Alert and oriented, no focal deficits, no motor or sensory deficits. CN2-12 intact. Sensation intact x4 extremities.  SKIN: Skin warm and dry. No evidence of rashes or lesions.

## 2023-11-06 NOTE — ED PROVIDER NOTE - PROGRESS NOTE DETAILS
[place], PGY-3, EM: pt signed out to me. pending ortho recs. Pt to OR tomorrow. per ortho resident, admit to their service.

## 2023-11-06 NOTE — ED PROVIDER NOTE - CLINICAL SUMMARY MEDICAL DECISION MAKING FREE TEXT BOX
87M h/o BPH, HLP, hypothyroid, CAD s/p CABG x5 (2006) and cardiac stents x2 (06/2021), prostate cancer s/p radiation therapy and immunotherapy, JOSH, hypothyroid and borderline DM, L3L5 lamiectomy, L4L5 fusion presenting bibems following a fall.  vital signs not actionable.  Physical exam with shortened and externally rotated left lower extremity concerning for hip versus femoral fracture.  Will obtain x-rays of the pelvis femur chest and also preoperative labs and give pain control reassess.

## 2023-11-06 NOTE — ED ADULT NURSE NOTE - NSFALLRISKINTERV_ED_ALL_ED

## 2023-11-06 NOTE — ED ADULT NURSE NOTE - OBJECTIVE STATEMENT
Pt is a 87 y/o male with PMH CABG x5, cardiac stents x2, lumbar laminectomy and fusion presenting to the ED by EMS s/p fall. Pt reports single mechanical fall today at home, slipped on a magazine. Pt denies head-striking, LOC. Pt is on aspirin daily. Pt endorsing severe LLE pain. LLE noted to be shortened and externally rotated. No bruising/lacerations/abrasions noted. Sensation intact, limited ROM to LLE at this time. Pt denies headache, chest pain, SOB, abdominal pain, numbness/tingling.

## 2023-11-07 DIAGNOSIS — E11.9 TYPE 2 DIABETES MELLITUS WITHOUT COMPLICATIONS: ICD-10-CM

## 2023-11-07 DIAGNOSIS — S72.142A DISPLACED INTERTROCHANTERIC FRACTURE OF LEFT FEMUR, INITIAL ENCOUNTER FOR CLOSED FRACTURE: ICD-10-CM

## 2023-11-07 DIAGNOSIS — S72.002A FRACTURE OF UNSPECIFIED PART OF NECK OF LEFT FEMUR, INITIAL ENCOUNTER FOR CLOSED FRACTURE: ICD-10-CM

## 2023-11-07 DIAGNOSIS — R52 PAIN, UNSPECIFIED: ICD-10-CM

## 2023-11-07 DIAGNOSIS — I25.10 ATHEROSCLEROTIC HEART DISEASE OF NATIVE CORONARY ARTERY WITHOUT ANGINA PECTORIS: ICD-10-CM

## 2023-11-07 DIAGNOSIS — C61 MALIGNANT NEOPLASM OF PROSTATE: ICD-10-CM

## 2023-11-07 LAB
24R-OH-CALCIDIOL SERPL-MCNC: 45.1 NG/ML — SIGNIFICANT CHANGE UP (ref 30–80)
24R-OH-CALCIDIOL SERPL-MCNC: 45.1 NG/ML — SIGNIFICANT CHANGE UP (ref 30–80)
ALBUMIN SERPL ELPH-MCNC: 3.4 G/DL — SIGNIFICANT CHANGE UP (ref 3.3–5)
ALBUMIN SERPL ELPH-MCNC: 3.4 G/DL — SIGNIFICANT CHANGE UP (ref 3.3–5)
ANION GAP SERPL CALC-SCNC: 11 MMOL/L — SIGNIFICANT CHANGE UP (ref 5–17)
ANION GAP SERPL CALC-SCNC: 11 MMOL/L — SIGNIFICANT CHANGE UP (ref 5–17)
ANION GAP SERPL CALC-SCNC: 16 MMOL/L — SIGNIFICANT CHANGE UP (ref 5–17)
ANION GAP SERPL CALC-SCNC: 16 MMOL/L — SIGNIFICANT CHANGE UP (ref 5–17)
APTT BLD: 24.9 SEC — SIGNIFICANT CHANGE UP (ref 24.5–35.6)
APTT BLD: 24.9 SEC — SIGNIFICANT CHANGE UP (ref 24.5–35.6)
BLD GP AB SCN SERPL QL: NEGATIVE — SIGNIFICANT CHANGE UP
BLD GP AB SCN SERPL QL: NEGATIVE — SIGNIFICANT CHANGE UP
BUN SERPL-MCNC: 27 MG/DL — HIGH (ref 7–23)
CALCIUM SERPL-MCNC: 8.7 MG/DL — SIGNIFICANT CHANGE UP (ref 8.4–10.5)
CALCIUM SERPL-MCNC: 8.7 MG/DL — SIGNIFICANT CHANGE UP (ref 8.4–10.5)
CALCIUM SERPL-MCNC: 8.9 MG/DL — SIGNIFICANT CHANGE UP (ref 8.4–10.5)
CALCIUM SERPL-MCNC: 8.9 MG/DL — SIGNIFICANT CHANGE UP (ref 8.4–10.5)
CHLORIDE SERPL-SCNC: 105 MMOL/L — SIGNIFICANT CHANGE UP (ref 96–108)
CHLORIDE SERPL-SCNC: 105 MMOL/L — SIGNIFICANT CHANGE UP (ref 96–108)
CHLORIDE SERPL-SCNC: 107 MMOL/L — SIGNIFICANT CHANGE UP (ref 96–108)
CHLORIDE SERPL-SCNC: 107 MMOL/L — SIGNIFICANT CHANGE UP (ref 96–108)
CO2 SERPL-SCNC: 20 MMOL/L — LOW (ref 22–31)
CO2 SERPL-SCNC: 20 MMOL/L — LOW (ref 22–31)
CO2 SERPL-SCNC: 21 MMOL/L — LOW (ref 22–31)
CO2 SERPL-SCNC: 21 MMOL/L — LOW (ref 22–31)
CREAT SERPL-MCNC: 0.78 MG/DL — SIGNIFICANT CHANGE UP (ref 0.5–1.3)
CREAT SERPL-MCNC: 0.78 MG/DL — SIGNIFICANT CHANGE UP (ref 0.5–1.3)
CREAT SERPL-MCNC: 0.94 MG/DL — SIGNIFICANT CHANGE UP (ref 0.5–1.3)
CREAT SERPL-MCNC: 0.94 MG/DL — SIGNIFICANT CHANGE UP (ref 0.5–1.3)
EGFR: 78 ML/MIN/1.73M2 — SIGNIFICANT CHANGE UP
EGFR: 78 ML/MIN/1.73M2 — SIGNIFICANT CHANGE UP
EGFR: 86 ML/MIN/1.73M2 — SIGNIFICANT CHANGE UP
EGFR: 86 ML/MIN/1.73M2 — SIGNIFICANT CHANGE UP
GAS PNL BLDA: SIGNIFICANT CHANGE UP
GAS PNL BLDA: SIGNIFICANT CHANGE UP
GLUCOSE BLDC GLUCOMTR-MCNC: 105 MG/DL — HIGH (ref 70–99)
GLUCOSE BLDC GLUCOMTR-MCNC: 105 MG/DL — HIGH (ref 70–99)
GLUCOSE BLDC GLUCOMTR-MCNC: 111 MG/DL — HIGH (ref 70–99)
GLUCOSE BLDC GLUCOMTR-MCNC: 111 MG/DL — HIGH (ref 70–99)
GLUCOSE BLDC GLUCOMTR-MCNC: 116 MG/DL — HIGH (ref 70–99)
GLUCOSE BLDC GLUCOMTR-MCNC: 116 MG/DL — HIGH (ref 70–99)
GLUCOSE BLDC GLUCOMTR-MCNC: 136 MG/DL — HIGH (ref 70–99)
GLUCOSE BLDC GLUCOMTR-MCNC: 136 MG/DL — HIGH (ref 70–99)
GLUCOSE SERPL-MCNC: 130 MG/DL — HIGH (ref 70–99)
GLUCOSE SERPL-MCNC: 130 MG/DL — HIGH (ref 70–99)
GLUCOSE SERPL-MCNC: 137 MG/DL — HIGH (ref 70–99)
GLUCOSE SERPL-MCNC: 137 MG/DL — HIGH (ref 70–99)
HCT VFR BLD CALC: 34.7 % — LOW (ref 39–50)
HCT VFR BLD CALC: 34.7 % — LOW (ref 39–50)
HCT VFR BLD CALC: 37.1 % — LOW (ref 39–50)
HCT VFR BLD CALC: 37.1 % — LOW (ref 39–50)
HGB BLD-MCNC: 11.4 G/DL — LOW (ref 13–17)
HGB BLD-MCNC: 11.4 G/DL — LOW (ref 13–17)
HGB BLD-MCNC: 12.3 G/DL — LOW (ref 13–17)
HGB BLD-MCNC: 12.3 G/DL — LOW (ref 13–17)
INR BLD: 1.16 RATIO — SIGNIFICANT CHANGE UP (ref 0.85–1.18)
INR BLD: 1.16 RATIO — SIGNIFICANT CHANGE UP (ref 0.85–1.18)
MCHC RBC-ENTMCNC: 31.3 PG — SIGNIFICANT CHANGE UP (ref 27–34)
MCHC RBC-ENTMCNC: 31.3 PG — SIGNIFICANT CHANGE UP (ref 27–34)
MCHC RBC-ENTMCNC: 31.9 PG — SIGNIFICANT CHANGE UP (ref 27–34)
MCHC RBC-ENTMCNC: 31.9 PG — SIGNIFICANT CHANGE UP (ref 27–34)
MCHC RBC-ENTMCNC: 32.9 GM/DL — SIGNIFICANT CHANGE UP (ref 32–36)
MCHC RBC-ENTMCNC: 32.9 GM/DL — SIGNIFICANT CHANGE UP (ref 32–36)
MCHC RBC-ENTMCNC: 33.2 GM/DL — SIGNIFICANT CHANGE UP (ref 32–36)
MCHC RBC-ENTMCNC: 33.2 GM/DL — SIGNIFICANT CHANGE UP (ref 32–36)
MCV RBC AUTO: 95.3 FL — SIGNIFICANT CHANGE UP (ref 80–100)
MCV RBC AUTO: 95.3 FL — SIGNIFICANT CHANGE UP (ref 80–100)
MCV RBC AUTO: 96.4 FL — SIGNIFICANT CHANGE UP (ref 80–100)
MCV RBC AUTO: 96.4 FL — SIGNIFICANT CHANGE UP (ref 80–100)
NRBC # BLD: 0 /100 WBCS — SIGNIFICANT CHANGE UP (ref 0–0)
PLATELET # BLD AUTO: 106 K/UL — LOW (ref 150–400)
PLATELET # BLD AUTO: 106 K/UL — LOW (ref 150–400)
PLATELET # BLD AUTO: 127 K/UL — LOW (ref 150–400)
PLATELET # BLD AUTO: 127 K/UL — LOW (ref 150–400)
POTASSIUM SERPL-MCNC: 4.3 MMOL/L — SIGNIFICANT CHANGE UP (ref 3.5–5.3)
POTASSIUM SERPL-MCNC: 4.3 MMOL/L — SIGNIFICANT CHANGE UP (ref 3.5–5.3)
POTASSIUM SERPL-MCNC: 4.5 MMOL/L — SIGNIFICANT CHANGE UP (ref 3.5–5.3)
POTASSIUM SERPL-MCNC: 4.5 MMOL/L — SIGNIFICANT CHANGE UP (ref 3.5–5.3)
POTASSIUM SERPL-SCNC: 4.3 MMOL/L — SIGNIFICANT CHANGE UP (ref 3.5–5.3)
POTASSIUM SERPL-SCNC: 4.3 MMOL/L — SIGNIFICANT CHANGE UP (ref 3.5–5.3)
POTASSIUM SERPL-SCNC: 4.5 MMOL/L — SIGNIFICANT CHANGE UP (ref 3.5–5.3)
POTASSIUM SERPL-SCNC: 4.5 MMOL/L — SIGNIFICANT CHANGE UP (ref 3.5–5.3)
PROTHROM AB SERPL-ACNC: 12.1 SEC — SIGNIFICANT CHANGE UP (ref 9.5–13)
PROTHROM AB SERPL-ACNC: 12.1 SEC — SIGNIFICANT CHANGE UP (ref 9.5–13)
RBC # BLD: 3.64 M/UL — LOW (ref 4.2–5.8)
RBC # BLD: 3.64 M/UL — LOW (ref 4.2–5.8)
RBC # BLD: 3.85 M/UL — LOW (ref 4.2–5.8)
RBC # BLD: 3.85 M/UL — LOW (ref 4.2–5.8)
RBC # FLD: 14.2 % — SIGNIFICANT CHANGE UP (ref 10.3–14.5)
RBC # FLD: 14.2 % — SIGNIFICANT CHANGE UP (ref 10.3–14.5)
RBC # FLD: 14.6 % — HIGH (ref 10.3–14.5)
RBC # FLD: 14.6 % — HIGH (ref 10.3–14.5)
RH IG SCN BLD-IMP: POSITIVE — SIGNIFICANT CHANGE UP
RH IG SCN BLD-IMP: POSITIVE — SIGNIFICANT CHANGE UP
SODIUM SERPL-SCNC: 139 MMOL/L — SIGNIFICANT CHANGE UP (ref 135–145)
SODIUM SERPL-SCNC: 139 MMOL/L — SIGNIFICANT CHANGE UP (ref 135–145)
SODIUM SERPL-SCNC: 141 MMOL/L — SIGNIFICANT CHANGE UP (ref 135–145)
SODIUM SERPL-SCNC: 141 MMOL/L — SIGNIFICANT CHANGE UP (ref 135–145)
WBC # BLD: 12.73 K/UL — HIGH (ref 3.8–10.5)
WBC # BLD: 12.73 K/UL — HIGH (ref 3.8–10.5)
WBC # BLD: 6.29 K/UL — SIGNIFICANT CHANGE UP (ref 3.8–10.5)
WBC # BLD: 6.29 K/UL — SIGNIFICANT CHANGE UP (ref 3.8–10.5)
WBC # FLD AUTO: 12.73 K/UL — HIGH (ref 3.8–10.5)
WBC # FLD AUTO: 12.73 K/UL — HIGH (ref 3.8–10.5)
WBC # FLD AUTO: 6.29 K/UL — SIGNIFICANT CHANGE UP (ref 3.8–10.5)
WBC # FLD AUTO: 6.29 K/UL — SIGNIFICANT CHANGE UP (ref 3.8–10.5)

## 2023-11-07 DEVICE — IMPLANTABLE DEVICE: Type: IMPLANTABLE DEVICE | Site: LEFT | Status: FUNCTIONAL

## 2023-11-07 DEVICE — GUIDE PIN 3.2X343MM: Type: IMPLANTABLE DEVICE | Site: LEFT | Status: FUNCTIONAL

## 2023-11-07 DEVICE — SCREW TRIGEN LO PROF 5.0X45MM: Type: IMPLANTABLE DEVICE | Site: LEFT | Status: FUNCTIONAL

## 2023-11-07 DEVICE — SCREW TRIGEN LO PROF 5.0X47.5MM: Type: IMPLANTABLE DEVICE | Site: LEFT | Status: FUNCTIONAL

## 2023-11-07 DEVICE — GUID ROD 3X1000MM: Type: IMPLANTABLE DEVICE | Site: LEFT | Status: FUNCTIONAL

## 2023-11-07 RX ORDER — OXYCODONE HYDROCHLORIDE 5 MG/1
5 TABLET ORAL EVERY 4 HOURS
Refills: 0 | Status: DISCONTINUED | OUTPATIENT
Start: 2023-11-07 | End: 2023-11-07

## 2023-11-07 RX ORDER — ACETAMINOPHEN 500 MG
975 TABLET ORAL EVERY 8 HOURS
Refills: 0 | Status: DISCONTINUED | OUTPATIENT
Start: 2023-11-07 | End: 2023-11-07

## 2023-11-07 RX ORDER — ATORVASTATIN CALCIUM 80 MG/1
40 TABLET, FILM COATED ORAL AT BEDTIME
Refills: 0 | Status: DISCONTINUED | OUTPATIENT
Start: 2023-11-07 | End: 2023-11-07

## 2023-11-07 RX ORDER — ACETAMINOPHEN 500 MG
1000 TABLET ORAL EVERY 6 HOURS
Refills: 0 | Status: COMPLETED | OUTPATIENT
Start: 2023-11-07 | End: 2023-11-08

## 2023-11-07 RX ORDER — FAMOTIDINE 10 MG/ML
20 INJECTION INTRAVENOUS EVERY 12 HOURS
Refills: 0 | Status: DISCONTINUED | OUTPATIENT
Start: 2023-11-07 | End: 2023-11-07

## 2023-11-07 RX ORDER — TRAMADOL HYDROCHLORIDE 50 MG/1
50 TABLET ORAL EVERY 6 HOURS
Refills: 0 | Status: DISCONTINUED | OUTPATIENT
Start: 2023-11-07 | End: 2023-11-11

## 2023-11-07 RX ORDER — LEVOTHYROXINE SODIUM 125 MCG
100 TABLET ORAL DAILY
Refills: 0 | Status: DISCONTINUED | OUTPATIENT
Start: 2023-11-07 | End: 2023-11-07

## 2023-11-07 RX ORDER — TAMSULOSIN HYDROCHLORIDE 0.4 MG/1
0.4 CAPSULE ORAL AT BEDTIME
Refills: 0 | Status: DISCONTINUED | OUTPATIENT
Start: 2023-11-07 | End: 2023-11-07

## 2023-11-07 RX ORDER — SODIUM CHLORIDE 9 MG/ML
1000 INJECTION, SOLUTION INTRAVENOUS
Refills: 0 | Status: DISCONTINUED | OUTPATIENT
Start: 2023-11-07 | End: 2023-11-11

## 2023-11-07 RX ORDER — PANTOPRAZOLE SODIUM 20 MG/1
40 TABLET, DELAYED RELEASE ORAL
Refills: 0 | Status: DISCONTINUED | OUTPATIENT
Start: 2023-11-07 | End: 2023-11-07

## 2023-11-07 RX ORDER — OXYCODONE HYDROCHLORIDE 5 MG/1
5 TABLET ORAL EVERY 4 HOURS
Refills: 0 | Status: DISCONTINUED | OUTPATIENT
Start: 2023-11-07 | End: 2023-11-11

## 2023-11-07 RX ORDER — LANOLIN ALCOHOL/MO/W.PET/CERES
3 CREAM (GRAM) TOPICAL AT BEDTIME
Refills: 0 | Status: DISCONTINUED | OUTPATIENT
Start: 2023-11-07 | End: 2023-11-07

## 2023-11-07 RX ORDER — INSULIN LISPRO 100/ML
VIAL (ML) SUBCUTANEOUS
Refills: 0 | Status: DISCONTINUED | OUTPATIENT
Start: 2023-11-07 | End: 2023-11-07

## 2023-11-07 RX ORDER — SENNA PLUS 8.6 MG/1
2 TABLET ORAL AT BEDTIME
Refills: 0 | Status: DISCONTINUED | OUTPATIENT
Start: 2023-11-07 | End: 2023-11-07

## 2023-11-07 RX ORDER — OXYCODONE HYDROCHLORIDE 5 MG/1
2.5 TABLET ORAL EVERY 4 HOURS
Refills: 0 | Status: DISCONTINUED | OUTPATIENT
Start: 2023-11-07 | End: 2023-11-11

## 2023-11-07 RX ORDER — SENNA PLUS 8.6 MG/1
2 TABLET ORAL AT BEDTIME
Refills: 0 | Status: DISCONTINUED | OUTPATIENT
Start: 2023-11-07 | End: 2023-11-11

## 2023-11-07 RX ORDER — HYDROMORPHONE HYDROCHLORIDE 2 MG/ML
0.5 INJECTION INTRAMUSCULAR; INTRAVENOUS; SUBCUTANEOUS
Refills: 0 | Status: DISCONTINUED | OUTPATIENT
Start: 2023-11-07 | End: 2023-11-07

## 2023-11-07 RX ORDER — SODIUM CHLORIDE 9 MG/ML
1000 INJECTION INTRAMUSCULAR; INTRAVENOUS; SUBCUTANEOUS
Refills: 0 | Status: DISCONTINUED | OUTPATIENT
Start: 2023-11-07 | End: 2023-11-07

## 2023-11-07 RX ORDER — GLUCAGON INJECTION, SOLUTION 0.5 MG/.1ML
1 INJECTION, SOLUTION SUBCUTANEOUS ONCE
Refills: 0 | Status: DISCONTINUED | OUTPATIENT
Start: 2023-11-07 | End: 2023-11-11

## 2023-11-07 RX ORDER — DEXTROSE 50 % IN WATER 50 %
25 SYRINGE (ML) INTRAVENOUS ONCE
Refills: 0 | Status: DISCONTINUED | OUTPATIENT
Start: 2023-11-07 | End: 2023-11-07

## 2023-11-07 RX ORDER — TAMSULOSIN HYDROCHLORIDE 0.4 MG/1
0.4 CAPSULE ORAL AT BEDTIME
Refills: 0 | Status: DISCONTINUED | OUTPATIENT
Start: 2023-11-07 | End: 2023-11-11

## 2023-11-07 RX ORDER — DEXTROSE 50 % IN WATER 50 %
25 SYRINGE (ML) INTRAVENOUS ONCE
Refills: 0 | Status: DISCONTINUED | OUTPATIENT
Start: 2023-11-07 | End: 2023-11-11

## 2023-11-07 RX ORDER — INSULIN LISPRO 100/ML
VIAL (ML) SUBCUTANEOUS AT BEDTIME
Refills: 0 | Status: DISCONTINUED | OUTPATIENT
Start: 2023-11-07 | End: 2023-11-11

## 2023-11-07 RX ORDER — OXYBUTYNIN CHLORIDE 5 MG
5 TABLET ORAL
Refills: 0 | Status: DISCONTINUED | OUTPATIENT
Start: 2023-11-07 | End: 2023-11-07

## 2023-11-07 RX ORDER — PANTOPRAZOLE SODIUM 20 MG/1
40 TABLET, DELAYED RELEASE ORAL
Refills: 0 | Status: DISCONTINUED | OUTPATIENT
Start: 2023-11-07 | End: 2023-11-11

## 2023-11-07 RX ORDER — RIVAROXABAN 15 MG-20MG
10 KIT ORAL DAILY
Refills: 0 | Status: DISCONTINUED | OUTPATIENT
Start: 2023-11-08 | End: 2023-11-11

## 2023-11-07 RX ORDER — METOCLOPRAMIDE HCL 10 MG
5 TABLET ORAL EVERY 6 HOURS
Refills: 0 | Status: DISCONTINUED | OUTPATIENT
Start: 2023-11-07 | End: 2023-11-07

## 2023-11-07 RX ORDER — POLYETHYLENE GLYCOL 3350 17 G/17G
17 POWDER, FOR SOLUTION ORAL DAILY
Refills: 0 | Status: DISCONTINUED | OUTPATIENT
Start: 2023-11-07 | End: 2023-11-11

## 2023-11-07 RX ORDER — ACETAMINOPHEN 500 MG
975 TABLET ORAL EVERY 8 HOURS
Refills: 0 | Status: DISCONTINUED | OUTPATIENT
Start: 2023-11-07 | End: 2023-11-11

## 2023-11-07 RX ORDER — LANOLIN ALCOHOL/MO/W.PET/CERES
3 CREAM (GRAM) TOPICAL AT BEDTIME
Refills: 0 | Status: DISCONTINUED | OUTPATIENT
Start: 2023-11-07 | End: 2023-11-11

## 2023-11-07 RX ORDER — TRAMADOL HYDROCHLORIDE 50 MG/1
50 TABLET ORAL EVERY 8 HOURS
Refills: 0 | Status: DISCONTINUED | OUTPATIENT
Start: 2023-11-07 | End: 2023-11-07

## 2023-11-07 RX ORDER — SODIUM CHLORIDE 9 MG/ML
1000 INJECTION, SOLUTION INTRAVENOUS
Refills: 0 | Status: DISCONTINUED | OUTPATIENT
Start: 2023-11-07 | End: 2023-11-07

## 2023-11-07 RX ORDER — ONDANSETRON 8 MG/1
4 TABLET, FILM COATED ORAL ONCE
Refills: 0 | Status: DISCONTINUED | OUTPATIENT
Start: 2023-11-07 | End: 2023-11-07

## 2023-11-07 RX ORDER — GLUCAGON INJECTION, SOLUTION 0.5 MG/.1ML
1 INJECTION, SOLUTION SUBCUTANEOUS ONCE
Refills: 0 | Status: DISCONTINUED | OUTPATIENT
Start: 2023-11-07 | End: 2023-11-07

## 2023-11-07 RX ORDER — INSULIN LISPRO 100/ML
VIAL (ML) SUBCUTANEOUS AT BEDTIME
Refills: 0 | Status: DISCONTINUED | OUTPATIENT
Start: 2023-11-07 | End: 2023-11-07

## 2023-11-07 RX ORDER — DEXTROSE 50 % IN WATER 50 %
15 SYRINGE (ML) INTRAVENOUS ONCE
Refills: 0 | Status: DISCONTINUED | OUTPATIENT
Start: 2023-11-07 | End: 2023-11-11

## 2023-11-07 RX ORDER — CHOLECALCIFEROL (VITAMIN D3) 125 MCG
2000 CAPSULE ORAL DAILY
Refills: 0 | Status: DISCONTINUED | OUTPATIENT
Start: 2023-11-07 | End: 2023-11-07

## 2023-11-07 RX ORDER — SODIUM CHLORIDE 9 MG/ML
1000 INJECTION INTRAMUSCULAR; INTRAVENOUS; SUBCUTANEOUS
Refills: 0 | Status: DISCONTINUED | OUTPATIENT
Start: 2023-11-07 | End: 2023-11-11

## 2023-11-07 RX ORDER — ATORVASTATIN CALCIUM 80 MG/1
10 TABLET, FILM COATED ORAL
Refills: 0 | Status: DISCONTINUED | OUTPATIENT
Start: 2023-11-07 | End: 2023-11-11

## 2023-11-07 RX ORDER — CEFAZOLIN SODIUM 1 G
2000 VIAL (EA) INJECTION EVERY 8 HOURS
Refills: 0 | Status: COMPLETED | OUTPATIENT
Start: 2023-11-08 | End: 2023-11-08

## 2023-11-07 RX ORDER — HYDROMORPHONE HYDROCHLORIDE 2 MG/ML
0.25 INJECTION INTRAMUSCULAR; INTRAVENOUS; SUBCUTANEOUS
Refills: 0 | Status: DISCONTINUED | OUTPATIENT
Start: 2023-11-07 | End: 2023-11-07

## 2023-11-07 RX ORDER — OXYBUTYNIN CHLORIDE 5 MG
5 TABLET ORAL
Refills: 0 | Status: DISCONTINUED | OUTPATIENT
Start: 2023-11-07 | End: 2023-11-11

## 2023-11-07 RX ORDER — DEXTROSE 50 % IN WATER 50 %
12.5 SYRINGE (ML) INTRAVENOUS ONCE
Refills: 0 | Status: DISCONTINUED | OUTPATIENT
Start: 2023-11-07 | End: 2023-11-11

## 2023-11-07 RX ORDER — LEVOTHYROXINE SODIUM 125 MCG
100 TABLET ORAL DAILY
Refills: 0 | Status: DISCONTINUED | OUTPATIENT
Start: 2023-11-07 | End: 2023-11-11

## 2023-11-07 RX ORDER — ASPIRIN/CALCIUM CARB/MAGNESIUM 324 MG
81 TABLET ORAL DAILY
Refills: 0 | Status: DISCONTINUED | OUTPATIENT
Start: 2023-11-07 | End: 2023-11-07

## 2023-11-07 RX ORDER — DEXTROSE 50 % IN WATER 50 %
12.5 SYRINGE (ML) INTRAVENOUS ONCE
Refills: 0 | Status: DISCONTINUED | OUTPATIENT
Start: 2023-11-07 | End: 2023-11-07

## 2023-11-07 RX ORDER — OXYCODONE HYDROCHLORIDE 5 MG/1
2.5 TABLET ORAL EVERY 4 HOURS
Refills: 0 | Status: DISCONTINUED | OUTPATIENT
Start: 2023-11-07 | End: 2023-11-07

## 2023-11-07 RX ORDER — OXYCODONE HYDROCHLORIDE 5 MG/1
5 TABLET ORAL ONCE
Refills: 0 | Status: DISCONTINUED | OUTPATIENT
Start: 2023-11-07 | End: 2023-11-07

## 2023-11-07 RX ORDER — OXYCODONE HYDROCHLORIDE 5 MG/1
10 TABLET ORAL EVERY 4 HOURS
Refills: 0 | Status: DISCONTINUED | OUTPATIENT
Start: 2023-11-07 | End: 2023-11-07

## 2023-11-07 RX ORDER — INSULIN LISPRO 100/ML
VIAL (ML) SUBCUTANEOUS
Refills: 0 | Status: DISCONTINUED | OUTPATIENT
Start: 2023-11-07 | End: 2023-11-11

## 2023-11-07 RX ORDER — CHOLECALCIFEROL (VITAMIN D3) 125 MCG
2000 CAPSULE ORAL DAILY
Refills: 0 | Status: DISCONTINUED | OUTPATIENT
Start: 2023-11-07 | End: 2023-11-11

## 2023-11-07 RX ORDER — DEXTROSE 50 % IN WATER 50 %
15 SYRINGE (ML) INTRAVENOUS ONCE
Refills: 0 | Status: DISCONTINUED | OUTPATIENT
Start: 2023-11-07 | End: 2023-11-07

## 2023-11-07 RX ADMIN — TAMSULOSIN HYDROCHLORIDE 0.4 MILLIGRAM(S): 0.4 CAPSULE ORAL at 21:57

## 2023-11-07 RX ADMIN — Medication 1000 MILLIGRAM(S): at 21:45

## 2023-11-07 RX ADMIN — ATORVASTATIN CALCIUM 10 MILLIGRAM(S): 80 TABLET, FILM COATED ORAL at 21:58

## 2023-11-07 RX ADMIN — Medication 2000 UNIT(S): at 13:13

## 2023-11-07 RX ADMIN — Medication 5 MILLIGRAM(S): at 21:58

## 2023-11-07 RX ADMIN — Medication 975 MILLIGRAM(S): at 14:59

## 2023-11-07 RX ADMIN — Medication 5 MILLIGRAM(S): at 05:22

## 2023-11-07 RX ADMIN — SODIUM CHLORIDE 75 MILLILITER(S): 9 INJECTION INTRAMUSCULAR; INTRAVENOUS; SUBCUTANEOUS at 03:14

## 2023-11-07 RX ADMIN — MORPHINE SULFATE 4 MILLIGRAM(S): 50 CAPSULE, EXTENDED RELEASE ORAL at 00:05

## 2023-11-07 RX ADMIN — SODIUM CHLORIDE 125 MILLILITER(S): 9 INJECTION INTRAMUSCULAR; INTRAVENOUS; SUBCUTANEOUS at 20:00

## 2023-11-07 RX ADMIN — Medication 975 MILLIGRAM(S): at 05:22

## 2023-11-07 RX ADMIN — Medication 400 MILLIGRAM(S): at 21:31

## 2023-11-07 RX ADMIN — Medication 100 MICROGRAM(S): at 05:22

## 2023-11-07 RX ADMIN — OXYCODONE HYDROCHLORIDE 5 MILLIGRAM(S): 5 TABLET ORAL at 04:15

## 2023-11-07 RX ADMIN — OXYCODONE HYDROCHLORIDE 5 MILLIGRAM(S): 5 TABLET ORAL at 03:13

## 2023-11-07 NOTE — PATIENT PROFILE ADULT - FALL HARM RISK - HARM RISK INTERVENTIONS
Assistance with ambulation/Assistance OOB with selected safe patient handling equipment/Communicate Risk of Fall with Harm to all staff/Discuss with provider need for PT consult/Monitor gait and stability/Provide patient with walking aids - walker, cane, crutches/Reinforce activity limits and safety measures with patient and family/Sit up slowly, dangle for a short time, stand at bedside before walking/Tailored Fall Risk Interventions/Use of alarms - bed, chair and/or voice tab/Visual Cue: Yellow wristband and red socks/Bed in lowest position, wheels locked, appropriate side rails in place/Call bell, personal items and telephone in reach/Instruct patient to call for assistance before getting out of bed or chair/Non-slip footwear when patient is out of bed/[place] to call system/Physically safe environment - no spills, clutter or unnecessary equipment/Purposeful Proactive Rounding/Room/bathroom lighting operational, light cord in reach

## 2023-11-07 NOTE — CONSULT NOTE ADULT - PROBLEM SELECTOR RECOMMENDATION 4
Patient with hx pf prostate CA treated with RT  continue tamsulosin and oxybutynin  follow for signs of retention

## 2023-11-07 NOTE — CONSULT NOTE ADULT - TIME BILLING
Discussed treatment plan with patient at bedside.   I am a non participating [facility] physician seeing Pt in coverage for Dr Prado Discussed treatment plan with patient at bedside.   I am a non participating [facility] physician seeing Pt in coverage for Dr Prado. The above patient documentation by Dr. Juárez was reviewed and I agree with his evaluation, assessment and treatment plan.  Simone Prado M.D.

## 2023-11-07 NOTE — CONSULT NOTE ADULT - SUBJECTIVE AND OBJECTIVE BOX
87 yo M h/o BPH, HLP, hypothyroid, CAD s/p CABG x5 (2006) and cardiac stents x2 (06/2021), prostate cancer s/p radiation therapy and immunotherapy, JOSH, hypothyroid and borderline DM, L3L5 lamiectomy, L4L5 fusion bibems following a fall.  Patient states  that he was walking in his home when he slipped on a magazine that was on the floor–fell onto the linoleum floor. Patient states he had pain  to the left upper leg and hip.  He could not ambulate and was brought to [facility] for further evaluation and treatement . In the ED he was found to have a left hip fracture and is scheduled for an Open reduction and internal fixation of the left hip      PAST MEDICAL & SURGICAL HISTORY:  High cholesterol      Hypothyroid      Coronary artery disease involving native coronary artery of native heart, angina presence unspecified      Prostate cancer      S/P radiation therapy      History of immunotherapy      Obstructive sleep apnea  pt reports PMH on CPAP in past, after losing weight he stopped using CPAP      Borderline diabetes      S/P CABG x 5      S/P tonsillectomy      S/P primary angioplasty with coronary stent  06/2021            MEDICATIONS  (STANDING):  acetaminophen     Tablet .. 975 milliGRAM(s) Oral every 8 hours  aspirin enteric coated 81 milliGRAM(s) Oral daily  atorvastatin 40 milliGRAM(s) Oral at bedtime  cholecalciferol 2000 Unit(s) Oral daily  dextrose 5%. 1000 milliLiter(s) (50 mL/Hr) IV Continuous <Continuous>  dextrose 5%. 1000 milliLiter(s) (100 mL/Hr) IV Continuous <Continuous>  dextrose 50% Injectable 25 Gram(s) IV Push once  dextrose 50% Injectable 12.5 Gram(s) IV Push once  dextrose 50% Injectable 25 Gram(s) IV Push once  famotidine    Tablet 20 milliGRAM(s) Oral every 12 hours  glucagon  Injectable 1 milliGRAM(s) IntraMuscular once  insulin lispro (ADMELOG) corrective regimen sliding scale   SubCutaneous three times a day before meals  insulin lispro (ADMELOG) corrective regimen sliding scale   SubCutaneous at bedtime  levothyroxine 100 MICROGram(s) Oral daily  oxybutynin 5 milliGRAM(s) Oral two times a day  pantoprazole    Tablet 40 milliGRAM(s) Oral before breakfast  senna 2 Tablet(s) Oral at bedtime  sodium chloride 0.9%. 1000 milliLiter(s) (75 mL/Hr) IV Continuous <Continuous>  tamsulosin 0.4 milliGRAM(s) Oral at bedtime    MEDICATIONS  (PRN):  dextrose Oral Gel 15 Gram(s) Oral once PRN Blood Glucose LESS THAN 70 milliGRAM(s)/deciliter  melatonin 3 milliGRAM(s) Oral at bedtime PRN Insomnia  metoclopramide Injectable 5 milliGRAM(s) IV Push every 6 hours PRN Nausea and/or Vomiting  oxyCODONE    IR 2.5 milliGRAM(s) Oral every 4 hours PRN Moderate Pain (4 - 6)  oxyCODONE    IR 5 milliGRAM(s) Oral every 4 hours PRN Severe Pain (7 - 10)  traMADol 50 milliGRAM(s) Oral every 8 hours PRN Mild Pain (1 - 3)    Social Hx:  Tobacco: Neg  ETOH: Socially  Drugs:  Neg    Family Hx:  As per my conversation with the patient, non contributory       ROS  CONSTITUTIONAL: No weakness, fevers or chills  EYES/ENT: No visual changes;  No vertigo or throat pain   NECK: No pain or stiffness  RESPIRATORY: No cough, wheezing, hemoptysis; No shortness of breath  CARDIOVASCULAR: No chest pain or palpitations  GASTROINTESTINAL: No abdominal or epigastric pain. No nausea, vomiting, or hematemesis; No diarrhea or constipation. No melena or hematochezia.  GENITOURINARY: No dysuria, frequency or hematuria  NEUROLOGICAL: No numbness or weakness  SKIN: No itching, burning, rashes, or lesions   MUSCULOSKEKETAL left hip pain,  b/l knee pain, Back and neck pain    INTERVAL HPI/OVERNIGHT EVENTS:  T(C): 37.3 (11-07-23 @ 05:20), Max: 37.3 (11-07-23 @ 05:20)  HR: 79 (11-07-23 @ 05:20) (67 - 81)  BP: 116/69 (11-07-23 @ 05:20) (110/66 - 156/81)  RR: 16 (11-07-23 @ 05:20) (16 - 20)  SpO2: 98% (11-07-23 @ 05:20) (98% - 100%)  Wt(kg): --  I&O's Summary      PHYSICAL EXAM:  GENERAL: NAD, well-groomed, well-developed  HEAD:  Atraumatic, Normocephalic  EYES: EOMI, PERRLA, conjunctiva and sclera clear  ENMT: No tonsillar erythema, exudates, or enlargement; Moist mucous membranes, Good dentition, No lesions  NECK: Supple, No JVD, Normal thyroid  NERVOUS SYSTEM:  Alert & Oriented X3, Good concentration; Motor Strength 5/5 B/L upper and lower extremities; DTRs 2+ intact and symmetric  CHEST/LUNG: Clear to percussion bilaterally; No rales, rhonchi, wheezing, or rubs  HEART: Regular rate and rhythm; No murmurs, rubs, or gallops  ABDOMEN: Soft, Nontender, Nondistended; Bowel sounds present  EXTREMITIES:  2+ Peripheral Pulses, No clubbing, cyanosis, or edema  LYMPH: No lymphadenopathy noted  SKIN: No rashes or lesions        LABS:                        11.4   6.29  )-----------( 127      ( 07 Nov 2023 01:54 )             34.7     11-07    139  |  107  |  27<H>  ----------------------------<  137<H>  4.3   |  21<L>  |  0.78    Ca    8.9      07 Nov 2023 01:55    TPro  x   /  Alb  3.4  /  TBili  x   /  DBili  x   /  AST  x   /  ALT  x   /  AlkPhos  x   11-07    PT/INR - ( 07 Nov 2023 01:54 )   PT: 12.1 sec;   INR: 1.16 ratio         PTT - ( 07 Nov 2023 01:54 )  PTT:24.9 sec  Urinalysis Basic - ( 07 Nov 2023 01:55 )    Color: x / Appearance: x / SG: x / pH: x  Gluc: 137 mg/dL / Ketone: x  / Bili: x / Urobili: x   Blood: x / Protein: x / Nitrite: x   Leuk Esterase: x / RBC: x / WBC x   Sq Epi: x / Non Sq Epi: x / Bacteria: x      CAPILLARY BLOOD GLUCOSE      POCT Blood Glucose.: 136 mg/dL (07 Nov 2023 08:34)        Urinalysis Basic - ( 07 Nov 2023 01:55 )    Color: x / Appearance: x / SG: x / pH: x  Gluc: 137 mg/dL / Ketone: x  / Bili: x / Urobili: x   Blood: x / Protein: x / Nitrite: x   Leuk Esterase: x / RBC: x / WBC x   Sq Epi: x / Non Sq Epi: x / Bacteria: x      EKG NSR @ 76 LAFB

## 2023-11-07 NOTE — H&P ADULT - HISTORY OF PRESENT ILLNESS
88y Male community ambulatory with cane presents c/o L hip pain and inability to ambulate sp mechanical fall. Denies HS/LOC. Denies numbness/tingling. Denies fever/chills. Denies pain/injury elsewhere. Takes a81, only AC.      PAST MEDICAL & SURGICAL HISTORY:  High cholesterol      Hypothyroid      Coronary artery disease involving native coronary artery of native heart, angina presence unspecified      Prostate cancer      S/P radiation therapy      History of immunotherapy      Obstructive sleep apnea  pt reports PMH on CPAP in past, after losing weight he stopped using CPAP      Borderline diabetes      S/P CABG x 5      S/P tonsillectomy      S/P primary angioplasty with coronary stent  06/2021        MEDICATIONS  (STANDING):    Allergies    No Known Allergies    Intolerances    epinephrine (Other)                          12.7   6.62  )-----------( 156      ( 06 Nov 2023 20:27 )             38.4     06 Nov 2023 20:27    141    |  107    |  28     ----------------------------<  131    4.6     |  21     |  0.81     Ca    9.6        06 Nov 2023 20:27    TPro  6.2    /  Alb  3.8    /  TBili  0.3    /  DBili  x      /  AST  31     /  ALT  39     /  AlkPhos  83     06 Nov 2023 20:27    PT/INR - ( 06 Nov 2023 20:27 )   PT: 11.6 sec;   INR: 1.11 ratio         PTT - ( 06 Nov 2023 20:27 )  PTT:27.1 sec  Vital Signs Last 24 Hrs  T(C): 36.7 (11-06-23 @ 20:08), Max: 36.7 (11-06-23 @ 18:05)  T(F): 98 (11-06-23 @ 20:08), Max: 98 (11-06-23 @ 18:05)  HR: 70 (11-06-23 @ 20:08) (67 - 70)  BP: 152/76 (11-06-23 @ 20:08) (152/76 - 156/81)  BP(mean): --  RR: 20 (11-06-23 @ 20:08) (18 - 20)  SpO2: 99% (11-06-23 @ 20:08) (99% - 100%)    Imaging: XR demonstrates L hip IT fracture    Physical Exam  General: NAD, Alert, Awake and oriented  Neurologic: No gross deficits, moving all 4s.    Neck/C-Spine: FAROM. No bony TTP.  T/L Spine: No bony TTP, no palpable step-off.    LEFT LE: No open skin. Externally Rotated and shortened. No deformities or other signs of trauma at  knee, lower leg, ankle or foot. Full baseline painless ROM at ankle and toes with. deferred log-roll and heel strike. Unable to actively SLR. +TA/GS/EHL/FHL.  SILT toes L3-S1. 2+ DP/PT pulses with brisk cap refill distally. Compartments soft and compressible.     Secondary Assessment:  NC/AT, NTTP of clavicles, NTTP of C-spine,T-spine, or L-spine in the midline and paraspinal areas; NTTP of pelvis  LUE: NTTP of Shoulder, Elbow, Wrist, Hand; NT with AROM/PROM of Shoulder, Elbow, Wrist, Hand; AIN/PIN/Med/Uln/Msc/Rad/Ax intact  RUE: NTTP of Shoulder, Elbow, Wrist, Hand; NT with AROM/PROM of Shoulder, Elbow, Wrist, Hand; AIN/PIN/Med/Uln/Msc/Rad/Ax intact     RLE: Able to SLR, NT with Log Roll, NT with Heel Strike, NTTP of Hip, Knee, Ankle, Foot; NT with AROM/PROM of Hip, Knee, Ankle, Foot; Q/H/GSC/TA/EHL/FHL intact        A/P: 88y Male with L IT hip fracture  Plan for OR tomorrow for IMN  NPO after midnight   Hold chemical DVT ppx  Pain control  NWB L LE, bedrest  FU labs/imaging  Medical and cardiac clearance/optimization for OR  Will discuss with attending

## 2023-11-07 NOTE — CONSULT NOTE ADULT - PROBLEM SELECTOR RECOMMENDATION 9
Patient scheduled for an Open reduction and internal fixation of the left hip  No contraindication to scheduled procedure  Patient is NPO  DVT and GI prophylaxis as per ortho

## 2023-11-07 NOTE — CONSULT NOTE ADULT - ASSESSMENT
87 yo male present s after a fall at home with a left hip fracture. Patient is scheduled for an Open reduction and internal fixation of the left hip

## 2023-11-07 NOTE — CONSULT NOTE ADULT - PROBLEM SELECTOR RECOMMENDATION 2
Patient with a hx of CAD and PTCA with stent placement by Dr Shepard at [facility]  Patient denies any chest pain or palpitations  cardiology following  continue to monitor heart rate and BP

## 2023-11-07 NOTE — CONSULT NOTE ADULT - PROBLEM SELECTOR RECOMMENDATION 3
continue insulin coverage  continue to monitor glucose  will adjust insulin as needed  Consistent carbohydrate diet

## 2023-11-07 NOTE — CONSULT NOTE ADULT - SUBJECTIVE AND OBJECTIVE BOX
DATE OF SERVICE: 11-07-23 @ 08:43    CHIEF COMPLAINT:Patient is a 88y old  Male who presents with a chief complaint of     HISTORY OF PRESENT ILLNESS:HPI:  88y Male community ambulatory with cane presents c/o L hip pain and inability to ambulate sp mechanical fall. Denies HS/LOC. Denies numbness/tingling. Denies fever/chills. Denies pain/injury elsewhere. Takes a81, only AC.      PAST MEDICAL & SURGICAL HISTORY:  High cholesterol      Hypothyroid      Coronary artery disease involving native coronary artery of native heart, angina presence unspecified      Prostate cancer      S/P radiation therapy      History of immunotherapy      Obstructive sleep apnea  pt reports PMH on CPAP in past, after losing weight he stopped using CPAP      Borderline diabetes      S/P CABG x 5      S/P tonsillectomy      S/P primary angioplasty with coronary stent  06/2021        MEDICATIONS  (STANDING):    Allergies    No Known Allergies    Intolerances    epinephrine (Other)                          12.7   6.62  )-----------( 156      ( 06 Nov 2023 20:27 )             38.4     06 Nov 2023 20:27    141    |  107    |  28     ----------------------------<  131    4.6     |  21     |  0.81     Ca    9.6        06 Nov 2023 20:27    TPro  6.2    /  Alb  3.8    /  TBili  0.3    /  DBili  x      /  AST  31     /  ALT  39     /  AlkPhos  83     06 Nov 2023 20:27    PT/INR - ( 06 Nov 2023 20:27 )   PT: 11.6 sec;   INR: 1.11 ratio         PTT - ( 06 Nov 2023 20:27 )  PTT:27.1 sec  Vital Signs Last 24 Hrs  T(C): 36.7 (11-06-23 @ 20:08), Max: 36.7 (11-06-23 @ 18:05)  T(F): 98 (11-06-23 @ 20:08), Max: 98 (11-06-23 @ 18:05)  HR: 70 (11-06-23 @ 20:08) (67 - 70)  BP: 152/76 (11-06-23 @ 20:08) (152/76 - 156/81)  BP(mean): --  RR: 20 (11-06-23 @ 20:08) (18 - 20)  SpO2: 99% (11-06-23 @ 20:08) (99% - 100%)    Imaging: XR demonstrates L hip IT fracture    Physical Exam  General: NAD, Alert, Awake and oriented  Neurologic: No gross deficits, moving all 4s.    Neck/C-Spine: FAROM. No bony TTP.  T/L Spine: No bony TTP, no palpable step-off.    LEFT LE: No open skin. Externally Rotated and shortened. No deformities or other signs of trauma at  knee, lower leg, ankle or foot. Full baseline painless ROM at ankle and toes with. deferred log-roll and heel strike. Unable to actively SLR. +TA/GS/EHL/FHL.  SILT toes L3-S1. 2+ DP/PT pulses with brisk cap refill distally. Compartments soft and compressible.     Secondary Assessment:  NC/AT, NTTP of clavicles, NTTP of C-spine,T-spine, or L-spine in the midline and paraspinal areas; NTTP of pelvis  LUE: NTTP of Shoulder, Elbow, Wrist, Hand; NT with AROM/PROM of Shoulder, Elbow, Wrist, Hand; AIN/PIN/Med/Uln/Msc/Rad/Ax intact  RUE: NTTP of Shoulder, Elbow, Wrist, Hand; NT with AROM/PROM of Shoulder, Elbow, Wrist, Hand; AIN/PIN/Med/Uln/Msc/Rad/Ax intact     RLE: Able to SLR, NT with Log Roll, NT with Heel Strike, NTTP of Hip, Knee, Ankle, Foot; NT with AROM/PROM of Hip, Knee, Ankle, Foot; Q/H/GSC/TA/EHL/FHL intact        A/P: 88y Male with L IT hip fracture  Plan for OR tomorrow for IMN  NPO after midnight   Hold chemical DVT ppx  Pain control  NWB L LE, bedrest  FU labs/imaging  Medical and cardiac clearance/optimization for OR  Will discuss with attending   (07 Nov 2023 00:57)      PAST MEDICAL & SURGICAL HISTORY:  High cholesterol      Hypothyroid      Coronary artery disease involving native coronary artery of native heart, angina presence unspecified      Prostate cancer      S/P radiation therapy      History of immunotherapy      Obstructive sleep apnea  pt reports PMH on CPAP in past, after losing weight he stopped using CPAP      Borderline diabetes      S/P CABG x 5      S/P tonsillectomy      S/P primary angioplasty with coronary stent  06/2021              MEDICATIONS:  aspirin enteric coated 81 milliGRAM(s) Oral daily        acetaminophen     Tablet .. 975 milliGRAM(s) Oral every 8 hours  melatonin 3 milliGRAM(s) Oral at bedtime PRN  metoclopramide Injectable 5 milliGRAM(s) IV Push every 6 hours PRN  oxyCODONE    IR 2.5 milliGRAM(s) Oral every 4 hours PRN  oxyCODONE    IR 5 milliGRAM(s) Oral every 4 hours PRN  traMADol 50 milliGRAM(s) Oral every 8 hours PRN    famotidine    Tablet 20 milliGRAM(s) Oral every 12 hours  pantoprazole    Tablet 40 milliGRAM(s) Oral before breakfast  senna 2 Tablet(s) Oral at bedtime    atorvastatin 40 milliGRAM(s) Oral at bedtime  dextrose 50% Injectable 25 Gram(s) IV Push once  dextrose 50% Injectable 12.5 Gram(s) IV Push once  dextrose 50% Injectable 25 Gram(s) IV Push once  dextrose Oral Gel 15 Gram(s) Oral once PRN  glucagon  Injectable 1 milliGRAM(s) IntraMuscular once  insulin lispro (ADMELOG) corrective regimen sliding scale   SubCutaneous three times a day before meals  insulin lispro (ADMELOG) corrective regimen sliding scale   SubCutaneous at bedtime  levothyroxine 100 MICROGram(s) Oral daily    cholecalciferol 2000 Unit(s) Oral daily  dextrose 5%. 1000 milliLiter(s) IV Continuous <Continuous>  dextrose 5%. 1000 milliLiter(s) IV Continuous <Continuous>  oxybutynin 5 milliGRAM(s) Oral two times a day  sodium chloride 0.9%. 1000 milliLiter(s) IV Continuous <Continuous>  tamsulosin 0.4 milliGRAM(s) Oral at bedtime      FAMILY HISTORY:  Family history of cerebrovascular accident (CVA) (Mother)    Family history of diabetes mellitus (Sibling)        Non-contributory    SOCIAL HISTORY:    [X] Tobacco  [X] Drugs  [X] Alcohol    Allergies    No Known Allergies    Intolerances    epinephrine (Other)  	    REVIEW OF SYSTEMS:  CONSTITUTIONAL: No fever  EYES: No eye pain, visual disturbances, or discharge  ENMT:  No difficulty hearing, tinnitus  NECK: No pain or stiffness  RESPIRATORY: No cough, wheezing,  CARDIOVASCULAR: No chest pain, palpitations, passing out, dizziness, or leg swelling  GASTROINTESTINAL:  No nausea, vomiting, diarrhea or constipation. No melena.  GENITOURINARY: No dysuria, hematuria  NEUROLOGICAL: No stroke like symptoms  SKIN: No burning or lesions   ENDOCRINE: No heat or cold intolerance  MUSCULOSKELETAL: No joint pain or swelling  PSYCHIATRIC: No  anxiety, mood swings  HEME/LYMPH: No bleeding gums  ALLERGY AND IMMUNOLOGIC: No hives or eczema	    All other ROS negative    PHYSICAL EXAM:  T(C): 37.3 (11-07-23 @ 05:20), Max: 37.3 (11-07-23 @ 05:20)  HR: 79 (11-07-23 @ 05:20) (67 - 81)  BP: 116/69 (11-07-23 @ 05:20) (110/66 - 156/81)  RR: 16 (11-07-23 @ 05:20) (16 - 20)  SpO2: 98% (11-07-23 @ 05:20) (98% - 100%)  Wt(kg): --  I&O's Summary      Appearance: Normal	  HEENT:   Normal oral mucosa, EOMI	  Cardiovascular:  S1 S2, No JVD,    Respiratory: Lungs clear to auscultation	  Psychiatry: Alert  Gastrointestinal:  Soft, Non-tender, + BS	  Skin: No rashes   Neurologic: Non-focal  Extremities:  No edema  Vascular: Peripheral pulses palpable    	    	  	  CARDIAC MARKERS:  Labs personally reviewed by me                                  11.4   6.29  )-----------( 127      ( 07 Nov 2023 01:54 )             34.7     11-07    139  |  107  |  27<H>  ----------------------------<  137<H>  4.3   |  21<L>  |  0.78    Ca    8.9      07 Nov 2023 01:55    TPro  x   /  Alb  3.4  /  TBili  x   /  DBili  x   /  AST  x   /  ALT  x   /  AlkPhos  x   11-07          EKG: Personally reviewed by me - EKG HR 72  Radiology: Personally reviewed by me -   Chest Xray 11/6  The cardiomediastinal silhouette is  not accurately assessed in this AP   projection. Median sternotomy. Mediastinal surgical clips.  The lungs are clear.  There is no pneumothorax or pleural effusion.  No acute bony abnormality.  IMPRESSION:  Clear lungs.      Assessment /Plan:   87M h/o BPH, HLP, hypothyroid, CAD s/p CABG x5 (2006) and cardiac stents x2 (06/2021), prostate cancer s/p radiation therapy and immunotherapy, JOSH, hypothyroid and borderline DM, L3L5 lamiectomy, L4L5 fusion bibems following a fall.  Patient states  that he was walking in his home when he slipped on a magazine that was on the floor–fell onto the linoleum floor.  Is endorsing pain to his left upper leg and hip.  Denies head strike or loss of consciousness.         Differential diagnosis and plan of care discussed with patient after the evaluation. Counseling on diet, nutritional counseling, weight management, exercise and medication compliance was done.   Advanced care planning/advanced directives discussed with patient/family. DNR status including forceful chest compressions to attempt to restart the heart, ventilator support/artificial breathing, electric shock, artificial nutrition, health care proxy, Molst form all discussed with pt. Pt wishes to consider. More than fifteen minutes spent on discussing advanced directives.     MADELIN Roth DO [facility]  Cardiovascular Medicine  [address] [place] [address] Dr, [address]  Available for call or text via [employer]  Office [phone]   DATE OF SERVICE: 11-07-23 @ 08:43    CHIEF COMPLAINT:Patient is a 88y old  Male who presents with a chief complaint of     HISTORY OF PRESENT ILLNESS:HPI:  88y Male community ambulatory with cane presents c/o L hip pain and inability to ambulate sp mechanical fall. Denies HS/LOC. Denies numbness/tingling. Denies fever/chills. Denies pain/injury elsewhere. Takes a81, only AC.      PAST MEDICAL & SURGICAL HISTORY:  High cholesterol      Hypothyroid      Coronary artery disease involving native coronary artery of native heart, angina presence unspecified      Prostate cancer      S/P radiation therapy      History of immunotherapy      Obstructive sleep apnea  pt reports PMH on CPAP in past, after losing weight he stopped using CPAP      Borderline diabetes      S/P CABG x 5      S/P tonsillectomy      S/P primary angioplasty with coronary stent  06/2021        MEDICATIONS  (STANDING):    Allergies    No Known Allergies    Intolerances    epinephrine (Other)                          12.7   6.62  )-----------( 156      ( 06 Nov 2023 20:27 )             38.4     06 Nov 2023 20:27    141    |  107    |  28     ----------------------------<  131    4.6     |  21     |  0.81     Ca    9.6        06 Nov 2023 20:27    TPro  6.2    /  Alb  3.8    /  TBili  0.3    /  DBili  x      /  AST  31     /  ALT  39     /  AlkPhos  83     06 Nov 2023 20:27    PT/INR - ( 06 Nov 2023 20:27 )   PT: 11.6 sec;   INR: 1.11 ratio         PTT - ( 06 Nov 2023 20:27 )  PTT:27.1 sec  Vital Signs Last 24 Hrs  T(C): 36.7 (11-06-23 @ 20:08), Max: 36.7 (11-06-23 @ 18:05)  T(F): 98 (11-06-23 @ 20:08), Max: 98 (11-06-23 @ 18:05)  HR: 70 (11-06-23 @ 20:08) (67 - 70)  BP: 152/76 (11-06-23 @ 20:08) (152/76 - 156/81)  BP(mean): --  RR: 20 (11-06-23 @ 20:08) (18 - 20)  SpO2: 99% (11-06-23 @ 20:08) (99% - 100%)    Imaging: XR demonstrates L hip IT fracture    Physical Exam  General: NAD, Alert, Awake and oriented  Neurologic: No gross deficits, moving all 4s.    Neck/C-Spine: FAROM. No bony TTP.  T/L Spine: No bony TTP, no palpable step-off.    LEFT LE: No open skin. Externally Rotated and shortened. No deformities or other signs of trauma at  knee, lower leg, ankle or foot. Full baseline painless ROM at ankle and toes with. deferred log-roll and heel strike. Unable to actively SLR. +TA/GS/EHL/FHL.  SILT toes L3-S1. 2+ DP/PT pulses with brisk cap refill distally. Compartments soft and compressible.     Secondary Assessment:  NC/AT, NTTP of clavicles, NTTP of C-spine,T-spine, or L-spine in the midline and paraspinal areas; NTTP of pelvis  LUE: NTTP of Shoulder, Elbow, Wrist, Hand; NT with AROM/PROM of Shoulder, Elbow, Wrist, Hand; AIN/PIN/Med/Uln/Msc/Rad/Ax intact  RUE: NTTP of Shoulder, Elbow, Wrist, Hand; NT with AROM/PROM of Shoulder, Elbow, Wrist, Hand; AIN/PIN/Med/Uln/Msc/Rad/Ax intact     RLE: Able to SLR, NT with Log Roll, NT with Heel Strike, NTTP of Hip, Knee, Ankle, Foot; NT with AROM/PROM of Hip, Knee, Ankle, Foot; Q/H/GSC/TA/EHL/FHL intact        A/P: 88y Male with L IT hip fracture  Plan for OR tomorrow for IMN  NPO after midnight   Hold chemical DVT ppx  Pain control  NWB L LE, bedrest  FU labs/imaging  Medical and cardiac clearance/optimization for OR  Will discuss with attending   (07 Nov 2023 00:57)      PAST MEDICAL & SURGICAL HISTORY:  High cholesterol      Hypothyroid      Coronary artery disease involving native coronary artery of native heart, angina presence unspecified      Prostate cancer      S/P radiation therapy      History of immunotherapy      Obstructive sleep apnea  pt reports PMH on CPAP in past, after losing weight he stopped using CPAP      Borderline diabetes      S/P CABG x 5      S/P tonsillectomy      S/P primary angioplasty with coronary stent  06/2021              MEDICATIONS:  aspirin enteric coated 81 milliGRAM(s) Oral daily        acetaminophen     Tablet .. 975 milliGRAM(s) Oral every 8 hours  melatonin 3 milliGRAM(s) Oral at bedtime PRN  metoclopramide Injectable 5 milliGRAM(s) IV Push every 6 hours PRN  oxyCODONE    IR 2.5 milliGRAM(s) Oral every 4 hours PRN  oxyCODONE    IR 5 milliGRAM(s) Oral every 4 hours PRN  traMADol 50 milliGRAM(s) Oral every 8 hours PRN    famotidine    Tablet 20 milliGRAM(s) Oral every 12 hours  pantoprazole    Tablet 40 milliGRAM(s) Oral before breakfast  senna 2 Tablet(s) Oral at bedtime    atorvastatin 40 milliGRAM(s) Oral at bedtime  dextrose 50% Injectable 25 Gram(s) IV Push once  dextrose 50% Injectable 12.5 Gram(s) IV Push once  dextrose 50% Injectable 25 Gram(s) IV Push once  dextrose Oral Gel 15 Gram(s) Oral once PRN  glucagon  Injectable 1 milliGRAM(s) IntraMuscular once  insulin lispro (ADMELOG) corrective regimen sliding scale   SubCutaneous three times a day before meals  insulin lispro (ADMELOG) corrective regimen sliding scale   SubCutaneous at bedtime  levothyroxine 100 MICROGram(s) Oral daily    cholecalciferol 2000 Unit(s) Oral daily  dextrose 5%. 1000 milliLiter(s) IV Continuous <Continuous>  dextrose 5%. 1000 milliLiter(s) IV Continuous <Continuous>  oxybutynin 5 milliGRAM(s) Oral two times a day  sodium chloride 0.9%. 1000 milliLiter(s) IV Continuous <Continuous>  tamsulosin 0.4 milliGRAM(s) Oral at bedtime      FAMILY HISTORY:  Family history of cerebrovascular accident (CVA) (Mother)    Family history of diabetes mellitus (Sibling)        Non-contributory    SOCIAL HISTORY:    [X] Tobacco  [X] Drugs  [X] Alcohol    Allergies    No Known Allergies    Intolerances    epinephrine (Other)  	    REVIEW OF SYSTEMS:  CONSTITUTIONAL: No fever  EYES: No eye pain, visual disturbances, or discharge  ENMT:  No difficulty hearing, tinnitus  NECK: No pain or stiffness  RESPIRATORY: No cough, wheezing,  CARDIOVASCULAR: No chest pain, palpitations, passing out, dizziness, or leg swelling  GASTROINTESTINAL:  No nausea, vomiting, diarrhea or constipation. No melena.  GENITOURINARY: No dysuria, hematuria  NEUROLOGICAL: No stroke like symptoms  SKIN: No burning or lesions   ENDOCRINE: No heat or cold intolerance  MUSCULOSKELETAL: No joint pain or swelling  PSYCHIATRIC: No  anxiety, mood swings  HEME/LYMPH: No bleeding gums  ALLERGY AND IMMUNOLOGIC: No hives or eczema	    All other ROS negative    PHYSICAL EXAM:  T(C): 37.3 (11-07-23 @ 05:20), Max: 37.3 (11-07-23 @ 05:20)  HR: 79 (11-07-23 @ 05:20) (67 - 81)  BP: 116/69 (11-07-23 @ 05:20) (110/66 - 156/81)  RR: 16 (11-07-23 @ 05:20) (16 - 20)  SpO2: 98% (11-07-23 @ 05:20) (98% - 100%)  Wt(kg): --  I&O's Summary      Appearance: Normal	  HEENT:   Normal oral mucosa, EOMI	  Cardiovascular:  S1 S2, No JVD,    Respiratory: Lungs clear to auscultation	  Psychiatry: Alert  Gastrointestinal:  Soft, Non-tender, + BS	  Skin: No rashes   Neurologic: Non-focal  Extremities:  No edema  Vascular: Peripheral pulses palpable    	    	  	  CARDIAC MARKERS:  Labs personally reviewed by me                                  11.4   6.29  )-----------( 127      ( 07 Nov 2023 01:54 )             34.7     11-07    139  |  107  |  27<H>  ----------------------------<  137<H>  4.3   |  21<L>  |  0.78    Ca    8.9      07 Nov 2023 01:55    TPro  x   /  Alb  3.4  /  TBili  x   /  DBili  x   /  AST  x   /  ALT  x   /  AlkPhos  x   11-07          EKG: Personally reviewed by me - EKG HR 72  Radiology: Personally reviewed by me -   Chest Xray 11/6  The cardiomediastinal silhouette is  not accurately assessed in this AP   projection. Median sternotomy. Mediastinal surgical clips.  The lungs are clear.  There is no pneumothorax or pleural effusion.  No acute bony abnormality.  IMPRESSION:  Clear lungs.      Assessment /Plan:   87M h/o BPH, HLP, hypothyroid, CAD s/p CABG x5 (2006) and cardiac stents x2 (06/2021), prostate cancer s/p radiation therapy and immunotherapy, JOSH, hypothyroid and borderline DM, L3L5 lamiectomy, L4L5 fusion bibems following a fall.  Patient states  that he was walking in his home when he slipped on a magazine that was on the floor–fell onto the linoleum floor.  Is endorsing pain to his left upper leg and hip.  Denies head strike or loss of consciousness.       1. Problem/Plan  Problem: Cardiac Risk Stratification  - EKG with no ischemia noted  - Patient not in decompensated HF  - No hx of tachy/rochelle syndrome  - Patient and family report good functional capicity  - Patient is mod risk for mod risk surgical procedure. No contraindication to proceed     2. Problem/Plan  Problem: Hypertension  - Patient reports BP regimen discontinued by OP cards given hypotension    3. Problem/Plan  Problem: Coronary Artery Disease  - Hx of CABG in 2006  - Hx of Cardiac stents x2 in 2021  - Continue ASA daily   - Continue Statin, currently on simvastatin 20mg every other day. Can transition to Atorvastatin 10mg every other day    4. Problem/Plan  Problem: : Need for prophylactic measure.   ·  Plan: -given surgical planning, currently on hold        Differential diagnosis and plan of care discussed with patient after the evaluation. Counseling on diet, nutritional counseling, weight management, exercise and medication compliance was done.   Advanced care planning/advanced directives discussed with patient/family. DNR status including forceful chest compressions to attempt to restart the heart, ventilator support/artificial breathing, electric shock, artificial nutrition, health care proxy, Molst form all discussed with pt. Pt wishes to consider. More than fifteen minutes spent on discussing advanced directives.     MADELIN Roth DO [facility]  Cardiovascular Medicine  [address], [address]  Available for call or text via [employer]  Office [phone]

## 2023-11-07 NOTE — CHART NOTE - NSCHARTNOTEFT_GEN_A_CORE
Post-Operative Check    Patient was seen and evaluated at bedside. Patient with no acute complaints. Pain is well controlled.  Denies CP/SOB, dyspnea, paresthesias, N/V    Vitals:  T(C): 36.4 (07 Nov 2023 21:45), Max: 37.3 (07 Nov 2023 05:20)  T(F): 97.5 (07 Nov 2023 21:45), Max: 99.1 (07 Nov 2023 05:20)  HR: 87 (07 Nov 2023 21:45) (72 - 98)  BP: 129/62 (07 Nov 2023 21:30) (105/59 - 153/69)  BP(mean): 89 (07 Nov 2023 21:30) (74 - 107)  RR: 16 (07 Nov 2023 21:45) (16 - 18)  SpO2: 99% (07 Nov 2023 21:45) (94% - 100%)    Parameters below as of 07 Nov 2023 21:45  Patient On (Oxygen Delivery Method): room air    Exam:  General: A&Ox3, NAD, resting comfortably in bed.  Laterality: LLE     Dressing is clean, dry, and intact       Sensation intact to light touch     2+ DP/PT pulses.     (+) DF/PF/EHL/FHL 5/5  Calves soft, nontender bilaterally    Labs:                12.3   12.73 )-----------( 106      ( 07 Nov 2023 20:17 )             37.1     11-07    141  |  105  |  27<H>  ----------------------------<  130<H>  4.5   |  20<L>  |  0.94    Ca    8.7      07 Nov 2023 20:17    TPro  x   /  Alb  3.4  /  TBili  x   /  DBili  x   /  AST  x   /  ALT  x   /  AlkPhos  x   11-07    A/P: 88y Male s/p Left Femur IM Nailing  -Pain management  -IS encouraged  -Ice/elevation  -DVT ppx: Xarelto 10mg QD to start POD#1, SCDs, early amb/oob  -PT/OT: WBAT LLE  -Continue with Post-op Antibiotics x 24hrs  -f/u AM labs  -DM     -f/u A1c     -low dose insulin sliding scale with meals and at bedtime  -Appreciate medicine and cardiology recs  -Discharge planning: pending PT/OT maty Magallon PA-C  Orthopedic Surgery  Pager #[phone] Post-Operative Check    Patient was seen and evaluated at bedside. Patient with no acute complaints. Pain is well controlled.  Denies CP/SOB, dyspnea, paresthesias, N/V    Vitals:  T(C): 36.4 (07 Nov 2023 21:45), Max: 37.3 (07 Nov 2023 05:20)  T(F): 97.5 (07 Nov 2023 21:45), Max: 99.1 (07 Nov 2023 05:20)  HR: 87 (07 Nov 2023 21:45) (72 - 98)  BP: 129/62 (07 Nov 2023 21:30) (105/59 - 153/69)  BP(mean): 89 (07 Nov 2023 21:30) (74 - 107)  RR: 16 (07 Nov 2023 21:45) (16 - 18)  SpO2: 99% (07 Nov 2023 21:45) (94% - 100%)    Parameters below as of 07 Nov 2023 21:45  Patient On (Oxygen Delivery Method): room air    Exam:  General: A&Ox3, NAD, resting comfortably in bed.  Laterality: LLE     Dsg/tegaderm x3 are clean, dry, and intact       Sensation intact to light touch     2+ DP pulse     (+) DF/PF/EHL/FHL 5/5  Calves soft, nontender bilaterally  :     Wilson in place    Labs:                12.3   12.73 )-----------( 106      ( 07 Nov 2023 20:17 )             37.1     11-07    141  |  105  |  27<H>  ----------------------------<  130<H>  4.5   |  20<L>  |  0.94    Ca    8.7      07 Nov 2023 20:17    TPro  x   /  Alb  3.4  /  TBili  x   /  DBili  x   /  AST  x   /  ALT  x   /  AlkPhos  x   11-07    A/P: 88y Male s/p Left Femur IM Nailing  -Pain management  -IS encouraged  -Ice/elevation  -DVT ppx: Xarelto 10mg QD to start POD#1, SCDs, early amb/oob  -PT/OT: WBAT LLE  -Continue with Post-op Antibiotics x 24hrs  -f/u AM labs  -DM     -f/u A1c     -low dose insulin sliding scale with meals and at bedtime  -Appreciate medicine and cardiology recs  -Remove Wilson in AM once patient ambulating  -Discharge planning: pending PT/OT maty Magallon PA-C  Orthopedic Surgery  Pager #[phone]

## 2023-11-08 ENCOUNTER — TRANSCRIPTION ENCOUNTER (OUTPATIENT)
Age: 88
End: 2023-11-08

## 2023-11-08 LAB
A1C WITH ESTIMATED AVERAGE GLUCOSE RESULT: 6.1 % — HIGH (ref 4–5.6)
A1C WITH ESTIMATED AVERAGE GLUCOSE RESULT: 6.1 % — HIGH (ref 4–5.6)
ANION GAP SERPL CALC-SCNC: 10 MMOL/L — SIGNIFICANT CHANGE UP (ref 5–17)
ANION GAP SERPL CALC-SCNC: 10 MMOL/L — SIGNIFICANT CHANGE UP (ref 5–17)
BASOPHILS # BLD AUTO: 0.01 K/UL — SIGNIFICANT CHANGE UP (ref 0–0.2)
BASOPHILS # BLD AUTO: 0.01 K/UL — SIGNIFICANT CHANGE UP (ref 0–0.2)
BASOPHILS NFR BLD AUTO: 0.1 % — SIGNIFICANT CHANGE UP (ref 0–2)
BASOPHILS NFR BLD AUTO: 0.1 % — SIGNIFICANT CHANGE UP (ref 0–2)
BUN SERPL-MCNC: 27 MG/DL — HIGH (ref 7–23)
BUN SERPL-MCNC: 27 MG/DL — HIGH (ref 7–23)
CALCIUM SERPL-MCNC: 8.1 MG/DL — LOW (ref 8.4–10.5)
CALCIUM SERPL-MCNC: 8.1 MG/DL — LOW (ref 8.4–10.5)
CHLORIDE SERPL-SCNC: 108 MMOL/L — SIGNIFICANT CHANGE UP (ref 96–108)
CHLORIDE SERPL-SCNC: 108 MMOL/L — SIGNIFICANT CHANGE UP (ref 96–108)
CO2 SERPL-SCNC: 21 MMOL/L — LOW (ref 22–31)
CO2 SERPL-SCNC: 21 MMOL/L — LOW (ref 22–31)
CREAT SERPL-MCNC: 0.84 MG/DL — SIGNIFICANT CHANGE UP (ref 0.5–1.3)
CREAT SERPL-MCNC: 0.84 MG/DL — SIGNIFICANT CHANGE UP (ref 0.5–1.3)
EGFR: 84 ML/MIN/1.73M2 — SIGNIFICANT CHANGE UP
EGFR: 84 ML/MIN/1.73M2 — SIGNIFICANT CHANGE UP
EOSINOPHIL # BLD AUTO: 0 K/UL — SIGNIFICANT CHANGE UP (ref 0–0.5)
EOSINOPHIL # BLD AUTO: 0 K/UL — SIGNIFICANT CHANGE UP (ref 0–0.5)
EOSINOPHIL NFR BLD AUTO: 0 % — SIGNIFICANT CHANGE UP (ref 0–6)
EOSINOPHIL NFR BLD AUTO: 0 % — SIGNIFICANT CHANGE UP (ref 0–6)
ESTIMATED AVERAGE GLUCOSE: 128 MG/DL — HIGH (ref 68–114)
ESTIMATED AVERAGE GLUCOSE: 128 MG/DL — HIGH (ref 68–114)
GLUCOSE BLDC GLUCOMTR-MCNC: 127 MG/DL — HIGH (ref 70–99)
GLUCOSE BLDC GLUCOMTR-MCNC: 127 MG/DL — HIGH (ref 70–99)
GLUCOSE BLDC GLUCOMTR-MCNC: 132 MG/DL — HIGH (ref 70–99)
GLUCOSE BLDC GLUCOMTR-MCNC: 132 MG/DL — HIGH (ref 70–99)
GLUCOSE BLDC GLUCOMTR-MCNC: 146 MG/DL — HIGH (ref 70–99)
GLUCOSE BLDC GLUCOMTR-MCNC: 146 MG/DL — HIGH (ref 70–99)
GLUCOSE BLDC GLUCOMTR-MCNC: 174 MG/DL — HIGH (ref 70–99)
GLUCOSE BLDC GLUCOMTR-MCNC: 174 MG/DL — HIGH (ref 70–99)
GLUCOSE SERPL-MCNC: 144 MG/DL — HIGH (ref 70–99)
GLUCOSE SERPL-MCNC: 144 MG/DL — HIGH (ref 70–99)
HCT VFR BLD CALC: 27.4 % — LOW (ref 39–50)
HCT VFR BLD CALC: 27.4 % — LOW (ref 39–50)
HGB BLD-MCNC: 9 G/DL — LOW (ref 13–17)
HGB BLD-MCNC: 9 G/DL — LOW (ref 13–17)
IMM GRANULOCYTES NFR BLD AUTO: 0.9 % — SIGNIFICANT CHANGE UP (ref 0–0.9)
IMM GRANULOCYTES NFR BLD AUTO: 0.9 % — SIGNIFICANT CHANGE UP (ref 0–0.9)
LYMPHOCYTES # BLD AUTO: 0.35 K/UL — LOW (ref 1–3.3)
LYMPHOCYTES # BLD AUTO: 0.35 K/UL — LOW (ref 1–3.3)
LYMPHOCYTES # BLD AUTO: 3.8 % — LOW (ref 13–44)
LYMPHOCYTES # BLD AUTO: 3.8 % — LOW (ref 13–44)
MCHC RBC-ENTMCNC: 31.1 PG — SIGNIFICANT CHANGE UP (ref 27–34)
MCHC RBC-ENTMCNC: 31.1 PG — SIGNIFICANT CHANGE UP (ref 27–34)
MCHC RBC-ENTMCNC: 32.8 GM/DL — SIGNIFICANT CHANGE UP (ref 32–36)
MCHC RBC-ENTMCNC: 32.8 GM/DL — SIGNIFICANT CHANGE UP (ref 32–36)
MCV RBC AUTO: 94.8 FL — SIGNIFICANT CHANGE UP (ref 80–100)
MCV RBC AUTO: 94.8 FL — SIGNIFICANT CHANGE UP (ref 80–100)
MONOCYTES # BLD AUTO: 0.55 K/UL — SIGNIFICANT CHANGE UP (ref 0–0.9)
MONOCYTES # BLD AUTO: 0.55 K/UL — SIGNIFICANT CHANGE UP (ref 0–0.9)
MONOCYTES NFR BLD AUTO: 6 % — SIGNIFICANT CHANGE UP (ref 2–14)
MONOCYTES NFR BLD AUTO: 6 % — SIGNIFICANT CHANGE UP (ref 2–14)
NEUTROPHILS # BLD AUTO: 8.14 K/UL — HIGH (ref 1.8–7.4)
NEUTROPHILS # BLD AUTO: 8.14 K/UL — HIGH (ref 1.8–7.4)
NEUTROPHILS NFR BLD AUTO: 89.2 % — HIGH (ref 43–77)
NEUTROPHILS NFR BLD AUTO: 89.2 % — HIGH (ref 43–77)
NRBC # BLD: 0 /100 WBCS — SIGNIFICANT CHANGE UP (ref 0–0)
NRBC # BLD: 0 /100 WBCS — SIGNIFICANT CHANGE UP (ref 0–0)
PLATELET # BLD AUTO: 75 K/UL — LOW (ref 150–400)
PLATELET # BLD AUTO: 75 K/UL — LOW (ref 150–400)
POTASSIUM SERPL-MCNC: 4.3 MMOL/L — SIGNIFICANT CHANGE UP (ref 3.5–5.3)
POTASSIUM SERPL-MCNC: 4.3 MMOL/L — SIGNIFICANT CHANGE UP (ref 3.5–5.3)
POTASSIUM SERPL-SCNC: 4.3 MMOL/L — SIGNIFICANT CHANGE UP (ref 3.5–5.3)
POTASSIUM SERPL-SCNC: 4.3 MMOL/L — SIGNIFICANT CHANGE UP (ref 3.5–5.3)
RBC # BLD: 2.89 M/UL — LOW (ref 4.2–5.8)
RBC # BLD: 2.89 M/UL — LOW (ref 4.2–5.8)
RBC # FLD: 14.6 % — HIGH (ref 10.3–14.5)
RBC # FLD: 14.6 % — HIGH (ref 10.3–14.5)
SODIUM SERPL-SCNC: 139 MMOL/L — SIGNIFICANT CHANGE UP (ref 135–145)
SODIUM SERPL-SCNC: 139 MMOL/L — SIGNIFICANT CHANGE UP (ref 135–145)
WBC # BLD: 9.13 K/UL — SIGNIFICANT CHANGE UP (ref 3.8–10.5)
WBC # BLD: 9.13 K/UL — SIGNIFICANT CHANGE UP (ref 3.8–10.5)
WBC # FLD AUTO: 9.13 K/UL — SIGNIFICANT CHANGE UP (ref 3.8–10.5)
WBC # FLD AUTO: 9.13 K/UL — SIGNIFICANT CHANGE UP (ref 3.8–10.5)

## 2023-11-08 RX ORDER — DICLOFENAC SODIUM 75 MG/1
75 TABLET, DELAYED RELEASE ORAL
Refills: 0 | Status: DISCONTINUED | OUTPATIENT
Start: 2023-11-08 | End: 2023-11-11

## 2023-11-08 RX ORDER — ASPIRIN/CALCIUM CARB/MAGNESIUM 324 MG
81 TABLET ORAL DAILY
Refills: 0 | Status: DISCONTINUED | OUTPATIENT
Start: 2023-11-08 | End: 2023-11-11

## 2023-11-08 RX ORDER — DICLOFENAC SODIUM 75 MG/1
75 TABLET, DELAYED RELEASE ORAL
Refills: 0 | Status: DISCONTINUED | OUTPATIENT
Start: 2023-11-08 | End: 2023-11-08

## 2023-11-08 RX ADMIN — TRAMADOL HYDROCHLORIDE 50 MILLIGRAM(S): 50 TABLET ORAL at 00:21

## 2023-11-08 RX ADMIN — SENNA PLUS 2 TABLET(S): 8.6 TABLET ORAL at 21:54

## 2023-11-08 RX ADMIN — Medication 100 MICROGRAM(S): at 04:39

## 2023-11-08 RX ADMIN — Medication 400 MILLIGRAM(S): at 09:54

## 2023-11-08 RX ADMIN — TRAMADOL HYDROCHLORIDE 50 MILLIGRAM(S): 50 TABLET ORAL at 01:00

## 2023-11-08 RX ADMIN — Medication 1000 MILLIGRAM(S): at 05:00

## 2023-11-08 RX ADMIN — PANTOPRAZOLE SODIUM 40 MILLIGRAM(S): 20 TABLET, DELAYED RELEASE ORAL at 04:39

## 2023-11-08 RX ADMIN — Medication 5 MILLIGRAM(S): at 04:39

## 2023-11-08 RX ADMIN — Medication 5 MILLIGRAM(S): at 17:00

## 2023-11-08 RX ADMIN — Medication 100 MILLIGRAM(S): at 09:54

## 2023-11-08 RX ADMIN — SODIUM CHLORIDE 125 MILLILITER(S): 9 INJECTION INTRAMUSCULAR; INTRAVENOUS; SUBCUTANEOUS at 04:52

## 2023-11-08 RX ADMIN — Medication 81 MILLIGRAM(S): at 12:09

## 2023-11-08 RX ADMIN — TAMSULOSIN HYDROCHLORIDE 0.4 MILLIGRAM(S): 0.4 CAPSULE ORAL at 21:54

## 2023-11-08 RX ADMIN — DICLOFENAC SODIUM 75 MILLIGRAM(S): 75 TABLET, DELAYED RELEASE ORAL at 21:54

## 2023-11-08 RX ADMIN — Medication 1000 MILLIGRAM(S): at 17:00

## 2023-11-08 RX ADMIN — Medication 1000 MILLIGRAM(S): at 10:35

## 2023-11-08 RX ADMIN — RIVAROXABAN 10 MILLIGRAM(S): KIT at 12:08

## 2023-11-08 RX ADMIN — Medication 2000 UNIT(S): at 12:08

## 2023-11-08 RX ADMIN — Medication 400 MILLIGRAM(S): at 17:00

## 2023-11-08 RX ADMIN — DICLOFENAC SODIUM 75 MILLIGRAM(S): 75 TABLET, DELAYED RELEASE ORAL at 22:24

## 2023-11-08 RX ADMIN — POLYETHYLENE GLYCOL 3350 17 GRAM(S): 17 POWDER, FOR SOLUTION ORAL at 12:09

## 2023-11-08 RX ADMIN — Medication 100 MILLIGRAM(S): at 01:26

## 2023-11-08 RX ADMIN — Medication 400 MILLIGRAM(S): at 04:39

## 2023-11-08 NOTE — DISCHARGE NOTE PROVIDER - CARE PROVIDER_API CALL
Rodrick Rahman  Orthopaedic Surgery  [address] [place] [address], [address]  [place] [address]  Phone: [phone]  Fax: [phone]  Follow Up Time: 2 weeks

## 2023-11-08 NOTE — PROGRESS NOTE ADULT - SUBJECTIVE AND OBJECTIVE BOX
89 yo M h/o BPH, HLP, hypothyroid, CAD s/p CABG x5 (2006) and cardiac stents x2 (06/2021), prostate cancer s/p radiation therapy and immunotherapy, JOSH, hypothyroid and borderline DM, L3L5 lamiectomy, L4L5 fusion bibems following a fall.  Patient states  that he was walking in his home when he slipped on a magazine that was on the floor–fell onto the linoleum floor. Patient states he had pain  to the left upper leg and hip.  He could not ambulate and was brought to [facility] for further evaluation and treatement . In the ED he was found to have a left hip fracture and is s/p an Open reduction and internal fixation of the left hip. Patient tolerated the procedure well. lonny  has been managed.      MEDICATIONS  (STANDING):  acetaminophen     Tablet .. 975 milliGRAM(s) Oral every 8 hours  acetaminophen   IVPB .. 1000 milliGRAM(s) IV Intermittent every 6 hours  aspirin enteric coated 81 milliGRAM(s) Oral daily  atorvastatin 10 milliGRAM(s) Oral <User Schedule>  cholecalciferol 2000 Unit(s) Oral daily  dextrose 5%. 1000 milliLiter(s) (50 mL/Hr) IV Continuous <Continuous>  dextrose 5%. 1000 milliLiter(s) (100 mL/Hr) IV Continuous <Continuous>  dextrose 50% Injectable 25 Gram(s) IV Push once  dextrose 50% Injectable 12.5 Gram(s) IV Push once  dextrose 50% Injectable 25 Gram(s) IV Push once  diclofenac 75 milliGRAM(s) Oral <User Schedule>  glucagon  Injectable 1 milliGRAM(s) IntraMuscular once  insulin lispro (ADMELOG) corrective regimen sliding scale   SubCutaneous at bedtime  insulin lispro (ADMELOG) corrective regimen sliding scale   SubCutaneous three times a day before meals  levothyroxine 100 MICROGram(s) Oral daily  oxybutynin 5 milliGRAM(s) Oral two times a day  pantoprazole    Tablet 40 milliGRAM(s) Oral before breakfast  polyethylene glycol 3350 17 Gram(s) Oral daily  rivaroxaban 10 milliGRAM(s) Oral daily  senna 2 Tablet(s) Oral at bedtime  sodium chloride 0.9%. 1000 milliLiter(s) (125 mL/Hr) IV Continuous <Continuous>  tamsulosin 0.4 milliGRAM(s) Oral at bedtime    MEDICATIONS  (PRN):  dextrose Oral Gel 15 Gram(s) Oral once PRN Blood Glucose LESS THAN 70 milliGRAM(s)/deciliter  melatonin 3 milliGRAM(s) Oral at bedtime PRN Insomnia  oxyCODONE    IR 2.5 milliGRAM(s) Oral every 4 hours PRN Moderate Pain (4 - 6)  oxyCODONE    IR 5 milliGRAM(s) Oral every 4 hours PRN Severe Pain (7 - 10)  traMADol 50 milliGRAM(s) Oral every 6 hours PRN Mild Pain (1 - 3)          VITALS:   T(C): 36.9 (11-08-23 @ 08:02), Max: 36.9 (11-07-23 @ 23:30)  HR: 73 (11-08-23 @ 11:54) (73 - 98)  BP: 118/75 (11-08-23 @ 11:54) (96/56 - 153/69)  RR: 18 (11-08-23 @ 08:02) (16 - 18)  SpO2: 95% (11-08-23 @ 11:54) (94% - 100%)  Wt(kg): --    PHYSICAL EXAM:  GENERAL: NAD, well-groomed, well-developed  HEAD:  Atraumatic, Normocephalic  EYES: EOMI, PERRLA, conjunctiva and sclera clear  ENMT: No tonsillar erythema, exudates, or enlargement; Moist mucous membranes, Good dentition, No lesions  NECK: Supple, No JVD, Normal thyroid  NERVOUS SYSTEM:  Alert & Oriented X3, Good concentration; Motor Strength 5/5 B/L upper and lower extremities; DTRs 2+ intact and symmetric  CHEST/LUNG: Clear to percussion bilaterally; No rales, rhonchi, wheezing, or rubs  HEART: Regular rate and rhythm; No murmurs, rubs, or gallops  ABDOMEN: Soft, Nontender, Nondistended; Bowel sounds present  EXTREMITIES:  2+ Peripheral Pulses, No clubbing, cyanosis, or edema  LYMPH: No lymphadenopathy noted  SKIN: No rashes or lesions    LABS:  ABG - ( 07 Nov 2023 18:32 )  pH, Arterial: 7.33  pH, Blood: x     /  pCO2: 45    /  pO2: 248   / HCO3: 24    / Base Excess: -2.3  /  SaO2: 99.8                  CBC Full  -  ( 08 Nov 2023 06:39 )  WBC Count : 9.13 K/uL  RBC Count : 2.89 M/uL  Hemoglobin : 9.0 g/dL  Hematocrit : 27.4 %  Platelet Count - Automated : 75 K/uL  Mean Cell Volume : 94.8 fl  Mean Cell Hemoglobin : 31.1 pg  Mean Cell Hemoglobin Concentration : 32.8 gm/dL  Auto Neutrophil # : 8.14 K/uL  Auto Lymphocyte # : 0.35 K/uL  Auto Monocyte # : 0.55 K/uL  Auto Eosinophil # : 0.00 K/uL  Auto Basophil # : 0.01 K/uL  Auto Neutrophil % : 89.2 %  Auto Lymphocyte % : 3.8 %  Auto Monocyte % : 6.0 %  Auto Eosinophil % : 0.0 %  Auto Basophil % : 0.1 %    11-08    139  |  108  |  27<H>  ----------------------------<  144<H>  4.3   |  21<L>  |  0.84    Ca    8.1<L>      08 Nov 2023 06:39    TPro  x   /  Alb  3.4  /  TBili  x   /  DBili  x   /  AST  x   /  ALT  x   /  AlkPhos  x   11-07    LIVER FUNCTIONS - ( 07 Nov 2023 01:55 )  Alb: 3.4 g/dL / Pro: x     / ALK PHOS: x     / ALT: x     / AST: x     / GGT: x           PT/INR - ( 07 Nov 2023 01:54 )   PT: 12.1 sec;   INR: 1.16 ratio         PTT - ( 07 Nov 2023 01:54 )  PTT:24.9 sec  Urinalysis Basic - ( 08 Nov 2023 06:39 )    Color: x / Appearance: x / SG: x / pH: x  Gluc: 144 mg/dL / Ketone: x  / Bili: x / Urobili: x   Blood: x / Protein: x / Nitrite: x   Leuk Esterase: x / RBC: x / WBC x   Sq Epi: x / Non Sq Epi: x / Bacteria: x      CAPILLARY BLOOD GLUCOSE      POCT Blood Glucose.: 174 mg/dL (08 Nov 2023 11:59)  POCT Blood Glucose.: 132 mg/dL (08 Nov 2023 08:26)  POCT Blood Glucose.: 105 mg/dL (07 Nov 2023 21:43)  POCT Blood Glucose.: 116 mg/dL (07 Nov 2023 20:07)      RADIOLOGY & ADDITIONAL TESTS:

## 2023-11-08 NOTE — CHART NOTE - NSCHARTNOTEFT_GEN_A_CORE
The patient is requiring a 3-in-1 commode s/p left hip hemiarthroplasty. The beneficiary is confined to one level of the home environment and there is no toilet on that level.     Barbara Flores PA-C  Orthopedic Surgery  Team Pager: [phone]

## 2023-11-08 NOTE — PROGRESS NOTE ADULT - SUBJECTIVE AND OBJECTIVE BOX
Patient was seen and evaluated at bedside. Patient with no acute complaints. Pain is well controlled.      LABS:                        12.3   12.73 )-----------( 106      ( 07 Nov 2023 20:17 )             37.1     11-07    141  |  105  |  27<H>  ----------------------------<  130<H>  4.5   |  20<L>  |  0.94    Ca    8.7      07 Nov 2023 20:17    TPro  x   /  Alb  3.4  /  TBili  x   /  DBili  x   /  AST  x   /  ALT  x   /  AlkPhos  x   11-07    PT/INR - ( 07 Nov 2023 01:54 )   PT: 12.1 sec;   INR: 1.16 ratio         PTT - ( 07 Nov 2023 01:54 )  PTT:24.9 sec  Urinalysis Basic - ( 07 Nov 2023 20:17 )    Color: x / Appearance: x / SG: x / pH: x  Gluc: 130 mg/dL / Ketone: x  / Bili: x / Urobili: x   Blood: x / Protein: x / Nitrite: x   Leuk Esterase: x / RBC: x / WBC x   Sq Epi: x / Non Sq Epi: x / Bacteria: x        VITAL SIGNS:  T(C): 36.6 (11-08-23 @ 04:27), Max: 37.2 (11-07-23 @ 08:20)  HR: 78 (11-08-23 @ 04:27) (72 - 98)  BP: 107/60 (11-08-23 @ 04:58) (96/56 - 153/69)  RR: 18 (11-08-23 @ 04:27) (16 - 18)  SpO2: 100% (11-08-23 @ 04:27) (94% - 100%)    Parameters below as of 07 Nov 2023 21:45  Patient On (Oxygen Delivery Method): room air    Exam:  General: A&Ox3, NAD, resting comfortably in bed.  Laterality: LLE     Dsg/tegaderm x3 are clean, dry, and intact       Sensation intact to light touch     2+ DP pulse     (+) DF/PF/EHL/FHL 5/5  Calves soft, nontender bilaterally  :     Parra in place        A/P: 88y Male s/p Left Femur IM Nailing  -DC parra on ambulation  -Pain management  -IS encouraged  -Ice/elevation  -DVT ppx: Xarelto 10mg QD to start POD#1, SCDs, early amb/oob  -PT/OT: WBAT LLE  -Continue with Post-op Antibiotics x 24hrs  -f/u AM labs  -DM     -f/u A1c     -low dose insulin sliding scale with meals and at bedtime  -Appreciate medicine and cardiology recs  -Remove Parra in AM once patient ambulating  -Discharge planning: pending PT/OT eval

## 2023-11-08 NOTE — PHYSICAL THERAPY INITIAL EVALUATION ADULT - GENERAL OBSERVATIONS, REHAB EVAL
Pt is s/p L femur IMN on 11/7, WBAT LLE, no heel raises or abduction ROM. Pt received semi-supine in bed in NAD, VSS (-) orthostatic, +iv, +parra, A&Ox4, agreeable to PT, wife present. RN samanta premedicated pt for session.

## 2023-11-08 NOTE — PROGRESS NOTE ADULT - TIME BILLING
Discussed treatment plan with patient at bedside.   I am a non participating [facility] physician seeing Pt in coverage for Dr Prado. The above patient documentation by Dr. Juárez was reviewed and I agree with his evaluation, assessment and treatment plan.  Simone Prado M.D.

## 2023-11-08 NOTE — PHYSICAL THERAPY INITIAL EVALUATION ADULT - TRANSFER TRAINING, PT EVAL
GOAL: Pt will perform all transfer activities using rolling walker with contact guard assist in 2 weeks.

## 2023-11-08 NOTE — PHYSICAL THERAPY INITIAL EVALUATION ADULT - PATIENT/FAMILY AGREES WITH PLAN
PT spoke at length with pt & wife-both aware pt requires full assist at this time, would require assistance at home, both prefer d/c home/no

## 2023-11-08 NOTE — DISCHARGE NOTE PROVIDER - NSDCFUADDINST_GEN_ALL_CORE_FT
DVT ppx: Continued on ASA 81mg daily, Started on Xarelto 10mg QD x 6 weeks.   GI ppx: Protonix  Keep dressing clean dry and intact. May remove dressing as well as staples if applicable on POD #14, replace with steris to the incision.   Pain management as per med rec.   Please follow up with your primary care physician as well as cardiologist regarding your hospitalization within 2-4 weeks of your discharge.  DVT ppx: Continued on ASA 81mg daily, Started on Xarelto 10mg QD x 6 weeks.   GI ppx: Protonix  Keep dressing clean dry and intact. May remove dressing and staples if applicable on POD #14, replace with steri strips to the incision.   Pain management as per med rec.   Please follow up with your primary care physician as well as cardiologist regarding your hospitalization within 2-4 weeks of your discharge.   Follow up with Dr. Rahman as above.

## 2023-11-08 NOTE — DISCHARGE NOTE PROVIDER - NSDCFUSCHEDAPPT_GEN_ALL_CORE_FT
[facility]  ORTHOSURG [address] [facility]  Scheduled Appointment: 12/27/2023

## 2023-11-08 NOTE — OCCUPATIONAL THERAPY INITIAL EVALUATION ADULT - RANGE OF MOTION EXAMINATION, LOWER EXTREMITY
L hip not tested due to pain/Left LE Active Assistive ROM was WFL (within functional limits)/Right LE Active ROM was WFL   (within functional limits)

## 2023-11-08 NOTE — PHYSICAL THERAPY INITIAL EVALUATION ADULT - GAIT PATTERN USED, PT EVAL
pivoting on LE, difficulty lifting LE to take steps, can laterally weight shift
Normal vision: sees adequately in most situations; can see medication labels, newsprint

## 2023-11-08 NOTE — PHYSICAL THERAPY INITIAL EVALUATION ADULT - ADDITIONAL COMMENTS
PTA pt was independent with mobility, ambulating outdoors with a cane. Pt has walk in shower, has grab bars and shower stool, also owns walker. Pt states son is coming this week to assist him at home.

## 2023-11-08 NOTE — OCCUPATIONAL THERAPY INITIAL EVALUATION ADULT - NSOTDMEREC_GEN_A_CORE
TBD at rehab; if home pt will require hospital bed, wheelchair, commode, and rolling walker. Pt will need a RW due to decreased balance for discharge. Pt will need a 3 in 1 commode as the pt is confined to a single level/room without a bathroom.

## 2023-11-08 NOTE — PHYSICAL THERAPY INITIAL EVALUATION ADULT - LIVES WITH, PROFILE
house with 5 steps to enter +1 rail, 1 flight inside home to bedroom however does have pull out couch on 1st floor/spouse

## 2023-11-08 NOTE — DISCHARGE NOTE PROVIDER - NSDCCPCAREPLAN_GEN_ALL_CORE_FT
PRINCIPAL DISCHARGE DIAGNOSIS  Diagnosis: Intertrochanteric fracture of left hip  Assessment and Plan of Treatment:

## 2023-11-08 NOTE — OCCUPATIONAL THERAPY INITIAL EVALUATION ADULT - LIVES WITH, PROFILE
Pt lives with his wife in a hosue +5 SINGH +HR +1 flight inside with HR to bedroom. Pt was previously independent with ADLs and mobility with a cane for outside the house, no AE inside the house./spouse

## 2023-11-08 NOTE — PROGRESS NOTE ADULT - SUBJECTIVE AND OBJECTIVE BOX
DATE OF SERVICE: 11-08-23 @ 15:13    Patient is a 88y old  Male who presents with a chief complaint of Left hip fx (08 Nov 2023 09:54)      INTERVAL HISTORY: Feels ok.     REVIEW OF SYSTEMS:  CONSTITUTIONAL: No weakness  EYES/ENT: No visual changes;  No throat pain   NECK: No pain or stiffness  RESPIRATORY: No cough, wheezing; No shortness of breath  CARDIOVASCULAR: No chest pain or palpitations  GASTROINTESTINAL: No abdominal  pain. No nausea, vomiting, or hematemesis  GENITOURINARY: No dysuria, frequency or hematuria  NEUROLOGICAL: No stroke like symptoms  SKIN: No rashes    	  MEDICATIONS:        PHYSICAL EXAM:  T(C): 36.9 (11-08-23 @ 08:02), Max: 37.2 (11-07-23 @ 15:26)  HR: 73 (11-08-23 @ 11:54) (72 - 98)  BP: 118/75 (11-08-23 @ 11:54) (96/56 - 153/69)  RR: 18 (11-08-23 @ 08:02) (16 - 18)  SpO2: 95% (11-08-23 @ 11:54) (94% - 100%)  Wt(kg): --  I&O's Summary    07 Nov 2023 07:01  -  08 Nov 2023 07:00  --------------------------------------------------------  IN: 0 mL / OUT: 250 mL / NET: -250 mL    08 Nov 2023 07:01  -  08 Nov 2023 15:13  --------------------------------------------------------  IN: 0 mL / OUT: 250 mL / NET: -250 mL      Height (cm): 172.7 (11-07 @ 15:26)  Weight (kg): 74.8 (11-07 @ 15:26)  BMI (kg/m2): 25.1 (11-07 @ 15:26)  BSA (m2): 1.88 (11-07 @ 15:26)    Appearance: In no distress	  HEENT:    PERRL, EOMI	  Cardiovascular:  S1 S2, No JVD  Respiratory: Lungs clear to auscultation	  Gastrointestinal:  Soft, Non-tender, + BS	  Vascularature:  No edema of LE  Psychiatric: Appropriate affect   Neuro: no acute focal deficits                               9.0    9.13  )-----------( 75       ( 08 Nov 2023 06:39 )             27.4     11-08    139  |  108  |  27<H>  ----------------------------<  144<H>  4.3   |  21<L>  |  0.84    Ca    8.1<L>      08 Nov 2023 06:39    TPro  x   /  Alb  3.4  /  TBili  x   /  DBili  x   /  AST  x   /  ALT  x   /  AlkPhos  x   11-07        Labs personally reviewed      ASSESSMENT/PLAN: 	      87M h/o BPH, HLP, hypothyroid, CAD s/p CABG x5 (2006) and cardiac stents x2 (06/2021), prostate cancer s/p radiation therapy and immunotherapy, JOSH, hypothyroid and borderline DM, L3L5 lamiectomy, L4L5 fusion bibems following a fall.  Patient states  that he was walking in his home when he slipped on a magazine that was on the floor–fell onto the linoleum floor.  Is endorsing pain to his left upper leg and hip.  Denies head strike or loss of consciousness.       1. Problem/Plan  Problem: Cardiac Risk Stratification  - EKG with no ischemia noted  - Patient not in decompensated HF  - No hx of tachy/rochelle syndrome  - Patient and family report good functional capicity  - Patient is mod risk for mod risk surgical procedure. No contraindication to proceed     2. Problem/Plan  Problem: Hypertension  - Patient reports BP regimen discontinued by OP cards given hypotension    3. Problem/Plan  Problem: Coronary Artery Disease  - Hx of CABG in 2006  - Hx of Cardiac stents x2 in 2021  - Continue ASA daily   - Continue Statin, currently on simvastatin 20mg every other day. Can transition to Atorvastatin 10mg every other day    4. Problem/Plan  Problem: : Need for prophylactic measure.   ·  Plan: -given surgical planning, currently on hold            Maggi Pineda, RAY-NP   Jordan De León DO [facility]  Cardiovascular Medicine  [address], [address]  Available through call or text on [employer]  Office: [phone]   DATE OF SERVICE: 11-08-23 @ 15:13    Patient is a 88y old  Male who presents with a chief complaint of Left hip fx (08 Nov 2023 09:54)      INTERVAL HISTORY: Feels ok.     REVIEW OF SYSTEMS:  CONSTITUTIONAL: No weakness  EYES/ENT: No visual changes;  No throat pain   NECK: No pain or stiffness  RESPIRATORY: No cough, wheezing; No shortness of breath  CARDIOVASCULAR: No chest pain or palpitations  GASTROINTESTINAL: No abdominal  pain. No nausea, vomiting, or hematemesis  GENITOURINARY: No dysuria, frequency or hematuria  NEUROLOGICAL: No stroke like symptoms  SKIN: No rashes    	  MEDICATIONS:        PHYSICAL EXAM:  T(C): 36.9 (11-08-23 @ 08:02), Max: 37.2 (11-07-23 @ 15:26)  HR: 73 (11-08-23 @ 11:54) (72 - 98)  BP: 118/75 (11-08-23 @ 11:54) (96/56 - 153/69)  RR: 18 (11-08-23 @ 08:02) (16 - 18)  SpO2: 95% (11-08-23 @ 11:54) (94% - 100%)  Wt(kg): --  I&O's Summary    07 Nov 2023 07:01  -  08 Nov 2023 07:00  --------------------------------------------------------  IN: 0 mL / OUT: 250 mL / NET: -250 mL    08 Nov 2023 07:01  -  08 Nov 2023 15:13  --------------------------------------------------------  IN: 0 mL / OUT: 250 mL / NET: -250 mL      Height (cm): 172.7 (11-07 @ 15:26)  Weight (kg): 74.8 (11-07 @ 15:26)  BMI (kg/m2): 25.1 (11-07 @ 15:26)  BSA (m2): 1.88 (11-07 @ 15:26)    Appearance: In no distress	  HEENT:    PERRL, EOMI	  Cardiovascular:  S1 S2, No JVD  Respiratory: Lungs clear to auscultation	  Gastrointestinal:  Soft, Non-tender, + BS	  Vascularature:  No edema of LE  Psychiatric: Appropriate affect   Neuro: no acute focal deficits                               9.0    9.13  )-----------( 75       ( 08 Nov 2023 06:39 )             27.4     11-08    139  |  108  |  27<H>  ----------------------------<  144<H>  4.3   |  21<L>  |  0.84    Ca    8.1<L>      08 Nov 2023 06:39    TPro  x   /  Alb  3.4  /  TBili  x   /  DBili  x   /  AST  x   /  ALT  x   /  AlkPhos  x   11-07        Labs personally reviewed      ASSESSMENT/PLAN: 	      87M h/o BPH, HLP, hypothyroid, CAD s/p CABG x5 (2006) and cardiac stents x2 (06/2021), prostate cancer s/p radiation therapy and immunotherapy, JOSH, hypothyroid and borderline DM, L3L5 lamiectomy, L4L5 fusion bibems following a fall.  Patient states  that he was walking in his home when he slipped on a magazine that was on the floor–fell onto the linoleum floor.  Is endorsing pain to his left upper leg and hip.  Denies head strike or loss of consciousness.       1. Problem/Plan  Problem: Cardiac Risk Stratification  - EKG with no ischemia noted  - Patient not in decompensated HF  - No hx of tachy/rochelle syndrome  - Patient and family report good functional capicity  - Patient is mod risk for mod risk surgical procedure. No contraindication to proceed   - Tolerated surgery well.     2. Problem/Plan  Problem: Hypertension  - Patient reports BP regimen discontinued by OP cards given hypotension    3. Problem/Plan  Problem: Coronary Artery Disease  - Hx of CABG in 2006  - Hx of Cardiac stents x2 in 2021  - Continue ASA daily   - Continue Statin, currently on simvastatin 20mg every other day. Can transition to Atorvastatin 10mg every other day    4. Problem/Plan  Problem: : Need for prophylactic measure.   ·  Plan: Xarelto 10mg PO daily            RAY Balderas-YESENIA De León DO [facility]  Cardiovascular Medicine  [address] [facility] [address], [address]  Available through call or text on [employer] TEAM[employer]  Office: [phone]

## 2023-11-08 NOTE — DISCHARGE NOTE PROVIDER - NSDCMRMEDTOKEN_GEN_ALL_CORE_FT
acetaminophen 325 mg oral tablet: 3 tab(s) orally every 8 hours  aspirin 81 mg oral tablet: 1 tab(s) orally once a day PM  chondroitin: 1500 milligram(s) orally once a day AM LD 12/6/2022  glucosamine: 1 tab(s) orally 3 times a day LD 12/6/2022  Multiple Vitamins oral tablet: 1 tab(s) orally once a day AM LD 12/6/2022  naloxone 4 mg/0.1 mL nasal spray: 4 milligram(s) intranasally into one nostril. Repeart every 2-3 minutes as needed in alternating nostrils for oversedation  oxybutynin 5 mg oral tablet: 1 tab(s) orally 2 times a day  oxyCODONE 5 mg oral tablet: 1-2 tab(s) orally every 4-6 hours, As needed, Moderate Pain (4 - 6) MDD:8  pantoprazole 40 mg oral delayed release tablet: 1 tab(s) orally once a day (before a meal)  physical therapy: Home physical therapy   polyethylene glycol 3350 oral powder for reconstitution: 17 gram(s) orally once a day  senna leaf extract oral tablet: 2 tab(s) orally once a day (at bedtime)  simvastatin 20 mg oral tablet: 1 tab(s) orally every other day PM  Synthroid 100 mcg (0.1 mg) oral tablet: 1 tab(s) orally once a day PM  tamsulosin 0.4 mg oral capsule: 1 cap(s) orally once a day PM  tiZANidine 2 mg oral tablet: 1 tab(s) orally every 8 hours, As Needed   traMADol 50 mg oral tablet: 1 tab(s) orally every 8 hours MDD:3  Vitamin D3: 3000 international unit(s) orally once a day   3-in-1 commode: s/p left hip IMN, BIRD = 99m  acetaminophen 325 mg oral tablet: 3 tab(s) orally every 8 hours  aspirin 81 mg oral tablet: 1 tab(s) orally once a day PM  chondroitin: 1500 milligram(s) orally once a day AM LD 12/6/2022  glucosamine: 1 tab(s) orally 3 times a day  12/6/2022  Hospital Bed with Gel Overlay: s/p left hip IMN, BIRD = 99m  Multiple Vitamins oral tablet: 1 tab(s) orally once a day AM LD 12/6/2022  naloxone 4 mg/0.1 mL nasal spray: 4 milligram(s) intranasally into one nostril. Repeart every 2-3 minutes as needed in alternating nostrils for oversedation  oxybutynin 5 mg oral tablet: 1 tab(s) orally 2 times a day  oxyCODONE 5 mg oral tablet: 1-2 tab(s) orally every 4-6 hours, As needed, Moderate Pain (4 - 6) MDD:8  pantoprazole 40 mg oral delayed release tablet: 1 tab(s) orally once a day (before a meal)  physical therapy: Home physical therapy   Poly fly wheelchair: s/p left hip IMN, BIRD = 99M  polyethylene glycol 3350 oral powder for reconstitution: 17 gram(s) orally once a day  senna leaf extract oral tablet: 2 tab(s) orally once a day (at bedtime)  simvastatin 20 mg oral tablet: 1 tab(s) orally every other day PM  Synthroid 100 mcg (0.1 mg) oral tablet: 1 tab(s) orally once a day PM  tamsulosin 0.4 mg oral capsule: 1 cap(s) orally once a day PM  tiZANidine 2 mg oral tablet: 1 tab(s) orally every 8 hours, As Needed   traMADol 50 mg oral tablet: 1 tab(s) orally every 8 hours MDD:3  Vitamin D3: 3000 international unit(s) orally once a day   3-in-1 commode: s/p left hip IMN, BIRD = 99m  aspirin 81 mg oral delayed release tablet: 1 tab(s) orally once a day  chondroitin: 1500 milligram(s) orally once a day AM LD 12/6/2022  glucosamine: 1 tab(s) orally 3 times a day LD 12/6/2022  Hospital Bed with Gel Overlay: s/p left hip IMN, BIRD = 99m  Multiple Vitamins oral tablet: 1 tab(s) orally once a day AM LD 12/6/2022  oxybutynin 5 mg oral tablet: 1 tab(s) orally 2 times a day  pantoprazole 40 mg oral delayed release tablet: 1 tab(s) orally once a day (before a meal)  physical therapy: Home physical therapy   Poly fly wheelchair: s/p left hip IMN, BIRD = 99M  polyethylene glycol 3350 oral powder for reconstitution: 17 gram(s) orally once a day  rivaroxaban 10 mg oral tablet: 1 tab(s) orally once a day MDD: 1  senna leaf extract oral tablet: 2 tab(s) orally once a day (at bedtime)  simvastatin 20 mg oral tablet: 1 tab(s) orally every other day PM  Synthroid 100 mcg (0.1 mg) oral tablet: 1 tab(s) orally once a day PM  tamsulosin 0.4 mg oral capsule: 1 cap(s) orally once a day PM  tiZANidine 2 mg oral tablet: 1 tab(s) orally every 8 hours, As Needed   traMADol 50 mg oral tablet: 1 tab(s) orally every 8 hours as needed for  moderate pain MDD: 3  Vitamin D3: 3000 international unit(s) orally once a day   3-in-1 commode: s/p left hip IMN, BIRD = 99m  aspirin 81 mg oral delayed release tablet: 1 tab(s) orally once a day  chondroitin: 1500 milligram(s) orally once a day AM LD 12/6/2022  glucosamine: 1 tab(s) orally 3 times a day LD 12/6/2022  Hospital Bed with Gel Overlay: s/p left hip IMN, BIRD = 99m  Multiple Vitamins oral tablet: 1 tab(s) orally once a day AM LD 12/6/2022  oxybutynin 5 mg oral tablet: 1 tab(s) orally 2 times a day  pantoprazole 40 mg oral delayed release tablet: 1 tab(s) orally once a day (before a meal)  physical therapy: Home physical therapy   Poly fly wheelchair: s/p left hip IMN, BIRD = 99M  polyethylene glycol 3350 oral powder for reconstitution: 17 gram(s) orally once a day  rivaroxaban 10 mg oral tablet: 1 tab(s) orally once a day anticoagulation prophylaxis for 6 weeks post op MDD: 1  senna leaf extract oral tablet: 2 tab(s) orally once a day (at bedtime)  simvastatin 20 mg oral tablet: 1 tab(s) orally every other day PM  Synthroid 100 mcg (0.1 mg) oral tablet: 1 tab(s) orally once a day PM  tamsulosin 0.4 mg oral capsule: 1 cap(s) orally once a day PM  tiZANidine 2 mg oral tablet: 1 tab(s) orally every 8 hours, As Needed   traMADol 50 mg oral tablet: 1 tab(s) orally every 8 hours as needed for  moderate pain MDD: 3  Vitamin D3: 3000 international unit(s) orally once a day

## 2023-11-08 NOTE — PHYSICAL THERAPY INITIAL EVALUATION ADULT - PERTINENT HX OF CURRENT PROBLEM, REHAB EVAL
89 yo M h/o BPH, HLP, hypothyroid, CAD s/p CABG x5 (2006) and cardiac stents x2 (06/2021), prostate cancer s/p radiation therapy and immunotherapy, JOSH, hypothyroid and borderline DM, L3L5 lamiectomy, L4L5 fusion bibems following a fall.  Patient states  that he was walking in his home when he slipped on a magazine that was on the floor–fell onto the linoleum floor. Patient states he had pain  to the left upper leg and hip.  He could not ambulate and was brought to [facility] for further evaluation and treatement . In the ED he was found to have a left hip fracture, now s/p L femur IMN on 11/7.

## 2023-11-08 NOTE — CHART NOTE - NSCHARTNOTEFT_GEN_A_CORE
Patient will require a standard manual wheelchair due to s/p left hip fracture ORIF with IMN.  The beneficiary has a mobility limitation that significantly inpairs his ability to participate in one or more MRADs such as toileting, feeding, dressing, grooming, and bathing in customary locations in the home. The patient's mobility cannot be sufficiently resolved by the use of an appropriately fitted cane or walker. The patient is unable to ambulate with a walker. Use of a manual wheelchair will significantly improve the beneficiary's ability to participate in MARDL's and the beneficiary will use it on a regular basis in home. The beneficiary is able and willing to use the wheelchair in the home. The beneficiary has sufficient upper extremity function and other physical and mental capabilities needed to safely self-propel the manual wheelchair that is provided in the home during a typical day.     Barbara Flores PA-C  Orthopedic Surgery  Team Pager: [phone] Screening Questionnaire for Adult Immunization    Are you sick today?   No   Do you have allergies to medications, food, a vaccine component or latex?   Yes   Have you ever had a serious reaction after receiving a vaccination?   No   Do you have a long-term health problem with heart disease, lung disease, asthma, kidney disease, metabolic disease (e.g. diabetes), anemia, or other blood disorder?   No   Do you have cancer, leukemia, HIV/AIDS, or any other immune system problem?   No   In the past 3 months, have you taken medications that affect  your immune system, such as prednisone, other steroids, or anticancer drugs; drugs for the treatment of rheumatoid arthritis, Crohn s disease, or psoriasis; or have you had radiation treatments?   No   Have you had a seizure, or a brain or other nervous system problem?   No   During the past year, have you received a transfusion of blood or blood     products, or been given immune (gamma) globulin or antiviral drug?   No   For women: Are you pregnant or is there a chance you could become        pregnant during the next month?   No   Have you received any vaccinations in the past 4 weeks?   No     Immunization questionnaire was positive for at least one answer.  Notified Dr. Miller.        Per orders of Dr. Miller, Screening performed by Patricia Arceo on 3/27/2019 at 1:47 PM.     Patient will require a polyfly wheelchair due to s/p left hip fracture ORIF with IMN.  The beneficiary has a mobility limitation that significantly inpairs his ability to participate in one or more MRADs such as toileting, feeding, dressing, grooming, and bathing in customary locations in the home. The patient's mobility cannot be sufficiently resolved by the use of an appropriately fitted cane or walker. The patient is unable to ambulate with a walker. Use of a manual wheelchair will significantly improve the beneficiary's ability to participate in MARDL's and the beneficiary will use it on a regular basis in home. The beneficiary is able and willing to use the wheelchair in the home. The beneficiary has sufficient upper extremity function and other physical and mental capabilities needed to safely self-propel the manual wheelchair that is provided in the home during a typical day.     Barbara Flores PA-C  Orthopedic Surgery  Team Pager: [phone] Patient will require a polyfly wheelchair due to left hip fracture, s/p left hip IMN. The beneficiary has a mobility limitation that significantly impairs his ability to participate in one or more MRADLs such as toileting, feeding, dressing, grooming, and bathing in customary locations in the home. The patient’s mobility limitation cannot be sufficiently resolved by the use of an appropriately fitted cane or walker. The patient is unable to ambulated with a walker. Use of a manual wheelchair will significantly improve the beneficiary’s ability to participate in MRADLs and the beneficiary will use it on a regular basis in the home. The beneficiary is able and willing to use the wheelchair in the home. The beneficiary cannot self-propel in a standard wheelchair. The patient can self-propel in a polyfly wheelchair. The beneficiary has sufficient upper extremity function and other physical and mental capabilities needed to safely self-propel the manual lightweight wheelchair that is provided in the home during a typical day.    Barbara Flores PA-C  Orthopedic Surgery  Team Pager: [phone]

## 2023-11-08 NOTE — CHART NOTE - NSCHARTNOTEFT_GEN_A_CORE
Patient will require a semi electric hospital bed due to ________.  Patient requires the head of the bed to be elevated more than 30 degrees.  Pillows and wedges are not effective.  Patient requires positioning of the body in ways not feasible with an ordinary bed.  Patient requires frequent repositioning to alleviate pain.  The member can independently affect the adjustment by operating the control.    The patient requires a gel overlay as The beneficiary has limited mobility – i.e., beneficiary cannot independently make changes in body position significant enough to alleviate pressure and at least one of conditions below    Barbara Flores PA-C  Orthopedic Surgery  Team Pager: [phone] Patient will require a semi electric hospital bed due to left hip fracture, with IMN surgical fixation.  Patient requires the head of the bed to be elevated more than 30 degrees.  Pillows and wedges are not effective.  Patient requires positioning of the body in ways not feasible with an ordinary bed.  Patient requires frequent repositioning to alleviate pain.  The member can independently affect the adjustment by operating the control.    The patient requires a gel overlay as The beneficiary has limited mobility – i.e., beneficiary cannot independently make changes in body position significant enough to alleviate pressure and at least one of conditions below    Barbara Flores PA-C  Orthopedic Surgery  Team Pager: [phone]

## 2023-11-08 NOTE — CHART NOTE - NSCHARTNOTEFT_GEN_A_CORE
Patient requires a patient lift for transfers between bed and (chair, wheelchair, or commode).  Without the use of a lift, the patient would be bed confined.  Patient is status post left hip IMN for surgical fixation of a left hip fracture.     Barbara Flores PA-C  Orthopedic Surgery  Team Pager: [phone]

## 2023-11-08 NOTE — DISCHARGE NOTE PROVIDER - HOSPITAL COURSE
History of Present Illness:  89 yo M h/o BPH, HLP, hypothyroid, CAD s/p CABG x5 (2006) and cardiac stents x2 (06/2021), prostate cancer s/p radiation therapy and immunotherapy, JOSH, hypothyroid and borderline DM, L3L5 lamiectomy, L4L5 fusion bibems following a fall.  Patient states  that he was walking in his home when he slipped on a magazine that was on the floor–fell onto the linoleum floor. Patient states he had pain  to the left upper leg and hip.  He could not ambulate and was brought to [facility] for further evaluation and treatement . In the ED he was found to have a left hip fracture and is scheduled for an Open reduction and internal fixation of the left hip    PAST MEDICAL & SURGICAL HISTORY:  High cholesterol  Hypothyroid  Coronary artery disease involving native coronary artery of native heart, angina presence unspecified  Prostate cancer  S/P radiation therapy  History of immunotherapy  Obstructive sleep apnea pt reports PMH on CPAP in past, after losing weight he stopped using CPAP  Borderline diabetes  S/P CABG x 5  S/P tonsillectomy  S/P primary angioplasty with coronary stent 6/2021    Hospital Course:  Patient admitted on 11/6/23 for surgical intervention of a left hip fracture. Following medical and cardiac clearance, as well as receiving pre-operative parenteral prophylactic antibiotics, the patient underwent an uncomplicated Left hip IMN with Dr. Rahman. Patient tolerated the procedure well and was transferred to the recovery room in stable condition, with a stable neuro / vascular exam of the operated extremity.    Patient was placed on Xarelto as well as continued on ASA 81mg (home dosing) for DVT ppx, and was placed on Protonix for GI protection.   Patient was made weight bearing as tolerated with the operative leg.     Remainder of hospital course: XXXX    Patient evaluated by PT/OT and recommended for disposition for XXXXX    Discharge and Orthopedic Care instructions were delineated in the Discharge Plan and reviewed with the patient. All medications were delineated in the medication reconciliation tool and key points were reviewed with the patient. They were deemed stable from an Orthopedic & medical standpoint for discharge ***           History of Present Illness:  87 yo M h/o BPH, HLP, hypothyroid, CAD s/p CABG x5 (2006) and cardiac stents x2 (06/2021), prostate cancer s/p radiation therapy and immunotherapy, JOSH, hypothyroid and borderline DM, L3L5 lamiectomy, L4L5 fusion bibems following a fall.  Patient states  that he was walking in his home when he slipped on a magazine that was on the floor–fell onto the linoleum floor. Patient states he had pain  to the left upper leg and hip.  He could not ambulate and was brought to [facility] for further evaluation and treatement . In the ED he was found to have a left hip fracture and is scheduled for an Open reduction and internal fixation of the left hip    PAST MEDICAL & SURGICAL HISTORY:  High cholesterol  Hypothyroid  Coronary artery disease involving native coronary artery of native heart, angina presence unspecified  Prostate cancer  S/P radiation therapy  History of immunotherapy  Obstructive sleep apnea pt reports PMH on CPAP in past, after losing weight he stopped using CPAP  Borderline diabetes  S/P CABG x 5  S/P tonsillectomy  S/P primary angioplasty with coronary stent 6/2021    Hospital Course:  Patient admitted on 11/6/23 for surgical intervention of a left hip fracture. Following medical and cardiac clearance, as well as receiving pre-operative parenteral prophylactic antibiotics, the patient underwent an uncomplicated Left hip IMN with Dr. Rahman. Patient tolerated the procedure well and was transferred to the recovery room in stable condition, with a stable neuro / vascular exam of the operated extremity.    Patient was placed on Xarelto as well as continued on ASA 81mg (home dosing) for DVT ppx, and was placed on Protonix for GI protection.   Patient was made weight bearing as tolerated with the operative leg.     Remainder of hospital course: Patient received 1U PRBC on 11/9/23 for ABLA.     Patient evaluated by PT/OT and recommended for disposition for subacute rehab, however patient and family requesting discharge to home. All DME recommended by PT ordered and supplied to patient prior to discharge.     Discharge and Orthopedic Care instructions were delineated in the Discharge Plan and reviewed with the patient. All medications were delineated in the medication reconciliation tool and key points were reviewed with the patient. They were deemed stable from an Orthopedic & medical standpoint for discharge on XXXXX.            History of Present Illness:  89 yo M h/o BPH, HLP, hypothyroid, CAD s/p CABG x5 (2006) and cardiac stents x2 (06/2021), prostate cancer s/p radiation therapy and immunotherapy, JOSH, hypothyroid and borderline DM, L3L5 lamiectomy, L4L5 fusion bibems following a fall.  Patient states  that he was walking in his home when he slipped on a magazine that was on the floor–fell onto the linoleum floor. Patient states he had pain  to the left upper leg and hip.  He could not ambulate and was brought to [facility] for further evaluation and treatement . In the ED he was found to have a left hip fracture and is scheduled for an Open reduction and internal fixation of the left hip    PAST MEDICAL & SURGICAL HISTORY:  High cholesterol  Hypothyroid  Coronary artery disease involving native coronary artery of native heart, angina presence unspecified  Prostate cancer  S/P radiation therapy  History of immunotherapy  Obstructive sleep apnea pt reports PMH on CPAP in past, after losing weight he stopped using CPAP  Borderline diabetes  S/P CABG x 5  S/P tonsillectomy  S/P primary angioplasty with coronary stent 6/2021    Hospital Course:  Patient admitted on 11/6/23 for surgical intervention of a left hip fracture. Following medical and cardiac clearance, as well as receiving pre-operative parenteral prophylactic antibiotics, the patient underwent an uncomplicated Left hip IMN with Dr. Rahman. Patient tolerated the procedure well and was transferred to the recovery room in stable condition, with a stable neuro / vascular exam of the operated extremity.    Patient was placed on Xarelto as well as continued on ASA 81mg (home dosing) for DVT ppx, and was placed on Protonix for GI protection.   Patient was made weight bearing as tolerated with the operative leg.     Remainder of hospital course: Patient received 1U PRBC on 11/9/23 for ABLA.     Patient evaluated by PT/OT and recommended for disposition for subacute rehab, however patient and family requesting discharge to home. All DME recommended by PT ordered and supplied to patient prior to discharge.     Discharge and Orthopedic Care instructions were delineated in the Discharge Plan and reviewed with the patient. All medications were delineated in the medication reconciliation tool and key points were reviewed with the patient. They were deemed stable from an Orthopedic & medical standpoint for discharge on 11/11/2023.

## 2023-11-08 NOTE — OCCUPATIONAL THERAPY INITIAL EVALUATION ADULT - PERTINENT HX OF CURRENT PROBLEM, REHAB EVAL
88y Male community ambulatory with cane presents c/o L hip pain and inability to ambulate sp mechanical fall. Now S/P Left hip IMN

## 2023-11-08 NOTE — DISCHARGE NOTE PROVIDER - NSDCCPTREATMENT_GEN_ALL_CORE_FT
PRINCIPAL PROCEDURE  Procedure: ORIF, hip, with intramedullary nail  Findings and Treatment: Left

## 2023-11-08 NOTE — OCCUPATIONAL THERAPY INITIAL EVALUATION ADULT - DIAGNOSIS, OT EVAL
Pt presents with  decreased strength, ROM, balance, endurance, impacting ability to perform ADL and mobility.

## 2023-11-09 LAB
ANION GAP SERPL CALC-SCNC: 7 MMOL/L — SIGNIFICANT CHANGE UP (ref 5–17)
ANION GAP SERPL CALC-SCNC: 7 MMOL/L — SIGNIFICANT CHANGE UP (ref 5–17)
BUN SERPL-MCNC: 34 MG/DL — HIGH (ref 7–23)
BUN SERPL-MCNC: 34 MG/DL — HIGH (ref 7–23)
CALCIUM SERPL-MCNC: 8.5 MG/DL — SIGNIFICANT CHANGE UP (ref 8.4–10.5)
CALCIUM SERPL-MCNC: 8.5 MG/DL — SIGNIFICANT CHANGE UP (ref 8.4–10.5)
CHLORIDE SERPL-SCNC: 108 MMOL/L — SIGNIFICANT CHANGE UP (ref 96–108)
CHLORIDE SERPL-SCNC: 108 MMOL/L — SIGNIFICANT CHANGE UP (ref 96–108)
CO2 SERPL-SCNC: 23 MMOL/L — SIGNIFICANT CHANGE UP (ref 22–31)
CO2 SERPL-SCNC: 23 MMOL/L — SIGNIFICANT CHANGE UP (ref 22–31)
CREAT SERPL-MCNC: 1 MG/DL — SIGNIFICANT CHANGE UP (ref 0.5–1.3)
CREAT SERPL-MCNC: 1 MG/DL — SIGNIFICANT CHANGE UP (ref 0.5–1.3)
EGFR: 73 ML/MIN/1.73M2 — SIGNIFICANT CHANGE UP
EGFR: 73 ML/MIN/1.73M2 — SIGNIFICANT CHANGE UP
GLUCOSE BLDC GLUCOMTR-MCNC: 107 MG/DL — HIGH (ref 70–99)
GLUCOSE BLDC GLUCOMTR-MCNC: 107 MG/DL — HIGH (ref 70–99)
GLUCOSE BLDC GLUCOMTR-MCNC: 127 MG/DL — HIGH (ref 70–99)
GLUCOSE BLDC GLUCOMTR-MCNC: 127 MG/DL — HIGH (ref 70–99)
GLUCOSE BLDC GLUCOMTR-MCNC: 135 MG/DL — HIGH (ref 70–99)
GLUCOSE BLDC GLUCOMTR-MCNC: 135 MG/DL — HIGH (ref 70–99)
GLUCOSE BLDC GLUCOMTR-MCNC: 154 MG/DL — HIGH (ref 70–99)
GLUCOSE BLDC GLUCOMTR-MCNC: 154 MG/DL — HIGH (ref 70–99)
GLUCOSE SERPL-MCNC: 100 MG/DL — HIGH (ref 70–99)
GLUCOSE SERPL-MCNC: 100 MG/DL — HIGH (ref 70–99)
HCT VFR BLD CALC: 24.5 % — LOW (ref 39–50)
HCT VFR BLD CALC: 24.5 % — LOW (ref 39–50)
HGB BLD-MCNC: 8.2 G/DL — LOW (ref 13–17)
HGB BLD-MCNC: 8.2 G/DL — LOW (ref 13–17)
MCHC RBC-ENTMCNC: 31.8 PG — SIGNIFICANT CHANGE UP (ref 27–34)
MCHC RBC-ENTMCNC: 31.8 PG — SIGNIFICANT CHANGE UP (ref 27–34)
MCHC RBC-ENTMCNC: 33.5 GM/DL — SIGNIFICANT CHANGE UP (ref 32–36)
MCHC RBC-ENTMCNC: 33.5 GM/DL — SIGNIFICANT CHANGE UP (ref 32–36)
MCV RBC AUTO: 95 FL — SIGNIFICANT CHANGE UP (ref 80–100)
MCV RBC AUTO: 95 FL — SIGNIFICANT CHANGE UP (ref 80–100)
NRBC # BLD: 0 /100 WBCS — SIGNIFICANT CHANGE UP (ref 0–0)
NRBC # BLD: 0 /100 WBCS — SIGNIFICANT CHANGE UP (ref 0–0)
PLATELET # BLD AUTO: 66 K/UL — LOW (ref 150–400)
PLATELET # BLD AUTO: 66 K/UL — LOW (ref 150–400)
POTASSIUM SERPL-MCNC: 4 MMOL/L — SIGNIFICANT CHANGE UP (ref 3.5–5.3)
POTASSIUM SERPL-MCNC: 4 MMOL/L — SIGNIFICANT CHANGE UP (ref 3.5–5.3)
POTASSIUM SERPL-SCNC: 4 MMOL/L — SIGNIFICANT CHANGE UP (ref 3.5–5.3)
POTASSIUM SERPL-SCNC: 4 MMOL/L — SIGNIFICANT CHANGE UP (ref 3.5–5.3)
RBC # BLD: 2.58 M/UL — LOW (ref 4.2–5.8)
RBC # BLD: 2.58 M/UL — LOW (ref 4.2–5.8)
RBC # FLD: 14.5 % — SIGNIFICANT CHANGE UP (ref 10.3–14.5)
RBC # FLD: 14.5 % — SIGNIFICANT CHANGE UP (ref 10.3–14.5)
SODIUM SERPL-SCNC: 138 MMOL/L — SIGNIFICANT CHANGE UP (ref 135–145)
SODIUM SERPL-SCNC: 138 MMOL/L — SIGNIFICANT CHANGE UP (ref 135–145)
WBC # BLD: 4.9 K/UL — SIGNIFICANT CHANGE UP (ref 3.8–10.5)
WBC # BLD: 4.9 K/UL — SIGNIFICANT CHANGE UP (ref 3.8–10.5)
WBC # FLD AUTO: 4.9 K/UL — SIGNIFICANT CHANGE UP (ref 3.8–10.5)
WBC # FLD AUTO: 4.9 K/UL — SIGNIFICANT CHANGE UP (ref 3.8–10.5)

## 2023-11-09 RX ADMIN — ATORVASTATIN CALCIUM 10 MILLIGRAM(S): 80 TABLET, FILM COATED ORAL at 21:47

## 2023-11-09 RX ADMIN — Medication 100 MICROGRAM(S): at 05:33

## 2023-11-09 RX ADMIN — RIVAROXABAN 10 MILLIGRAM(S): KIT at 12:29

## 2023-11-09 RX ADMIN — PANTOPRAZOLE SODIUM 40 MILLIGRAM(S): 20 TABLET, DELAYED RELEASE ORAL at 05:34

## 2023-11-09 RX ADMIN — TRAMADOL HYDROCHLORIDE 50 MILLIGRAM(S): 50 TABLET ORAL at 06:50

## 2023-11-09 RX ADMIN — TRAMADOL HYDROCHLORIDE 50 MILLIGRAM(S): 50 TABLET ORAL at 11:20

## 2023-11-09 RX ADMIN — SENNA PLUS 2 TABLET(S): 8.6 TABLET ORAL at 21:46

## 2023-11-09 RX ADMIN — Medication 81 MILLIGRAM(S): at 12:30

## 2023-11-09 RX ADMIN — Medication 2000 UNIT(S): at 12:29

## 2023-11-09 RX ADMIN — TRAMADOL HYDROCHLORIDE 50 MILLIGRAM(S): 50 TABLET ORAL at 06:19

## 2023-11-09 RX ADMIN — POLYETHYLENE GLYCOL 3350 17 GRAM(S): 17 POWDER, FOR SOLUTION ORAL at 12:34

## 2023-11-09 RX ADMIN — Medication 5 MILLIGRAM(S): at 17:38

## 2023-11-09 RX ADMIN — Medication 5 MILLIGRAM(S): at 05:33

## 2023-11-09 RX ADMIN — TRAMADOL HYDROCHLORIDE 50 MILLIGRAM(S): 50 TABLET ORAL at 12:20

## 2023-11-09 RX ADMIN — TAMSULOSIN HYDROCHLORIDE 0.4 MILLIGRAM(S): 0.4 CAPSULE ORAL at 21:48

## 2023-11-09 NOTE — PROGRESS NOTE ADULT - SUBJECTIVE AND OBJECTIVE BOX
Pt seen/examined. Doing well. Pain controlled. No acute overnight complaints or events.    T(C): 36.9 (11-09-23 @ 04:26), Max: 36.9 (11-08-23 @ 08:02)  HR: 79 (11-09-23 @ 04:26) (73 - 79)  BP: 115/62 (11-09-23 @ 04:26) (97/55 - 118/75)  RR: 18 (11-09-23 @ 04:26) (18 - 18)  SpO2: 95% (11-09-23 @ 04:26) (94% - 98%)  Wt(kg): --  - Gen: NAD    Exam:  General: A&Ox3, NAD, resting comfortably in bed.  Laterality: LLE     Dsg/tegaderm x3 are clean, dry, and intact       Sensation intact to light touch     2+ DP pulse     (+) DF/PF/EHL/FHL 5/5  Calves soft, nontender bilaterally      A/P: 88y Male s/p Left Femur IM Nailing    -Pain management  -IS encouraged  -Ice/elevation  -DVT ppx: Xarelto 10mg QD to start POD#1, SCDs, early amb/oob. A81  -PT/OT: WBAT LLE  -f/u AM labs  -DM     -low dose insulin sliding scale with meals and at bedtime  -Appreciate medicine and cardiology recs  -Discharge planning: JOHNNIE

## 2023-11-09 NOTE — PROGRESS NOTE ADULT - SUBJECTIVE AND OBJECTIVE BOX
covering for dr Juárez       89 yo M h/o BPH, HLP, hypothyroid, CAD s/p CABG x5 (2006) and cardiac stents x2 (06/2021), prostate cancer s/p radiation therapy and immunotherapy, JOSH, hypothyroid and borderline DM, L3L5 lamiectomy, L4L5 fusion bibems following a fall.  Patient states  that he was walking in his home when he slipped on a magazine that was on the floor–fell onto the linoleum floor. Patient states he had pain  to the left upper leg and hip.  He could not ambulate and was brought to [facility] for further evaluation and treatement . In the ED he was found to have a left hip fracture and is s/p an Open reduction and internal fixation of the left hip. Patient tolerated the procedure well. pain  has been managed.     MEDICATIONS  (STANDING):  acetaminophen     Tablet .. 975 milliGRAM(s) Oral every 8 hours  aspirin enteric coated 81 milliGRAM(s) Oral daily  atorvastatin 10 milliGRAM(s) Oral <User Schedule>  cholecalciferol 2000 Unit(s) Oral daily  dextrose 5%. 1000 milliLiter(s) (50 mL/Hr) IV Continuous <Continuous>  dextrose 5%. 1000 milliLiter(s) (100 mL/Hr) IV Continuous <Continuous>  dextrose 50% Injectable 25 Gram(s) IV Push once  dextrose 50% Injectable 12.5 Gram(s) IV Push once  dextrose 50% Injectable 25 Gram(s) IV Push once  diclofenac 75 milliGRAM(s) Oral <User Schedule>  glucagon  Injectable 1 milliGRAM(s) IntraMuscular once  insulin lispro (ADMELOG) corrective regimen sliding scale   SubCutaneous three times a day before meals  insulin lispro (ADMELOG) corrective regimen sliding scale   SubCutaneous at bedtime  levothyroxine 100 MICROGram(s) Oral daily  oxybutynin 5 milliGRAM(s) Oral two times a day  pantoprazole    Tablet 40 milliGRAM(s) Oral before breakfast  polyethylene glycol 3350 17 Gram(s) Oral daily  rivaroxaban 10 milliGRAM(s) Oral daily  senna 2 Tablet(s) Oral at bedtime  sodium chloride 0.9%. 1000 milliLiter(s) (125 mL/Hr) IV Continuous <Continuous>  tamsulosin 0.4 milliGRAM(s) Oral at bedtime    MEDICATIONS  (PRN):  dextrose Oral Gel 15 Gram(s) Oral once PRN Blood Glucose LESS THAN 70 milliGRAM(s)/deciliter  melatonin 3 milliGRAM(s) Oral at bedtime PRN Insomnia  oxyCODONE    IR 2.5 milliGRAM(s) Oral every 4 hours PRN Moderate Pain (4 - 6)  oxyCODONE    IR 5 milliGRAM(s) Oral every 4 hours PRN Severe Pain (7 - 10)  traMADol 50 milliGRAM(s) Oral every 6 hours PRN Mild Pain (1 - 3)        Vital Signs Last 24 Hrs  T(C): 36.9 (09 Nov 2023 12:01), Max: 36.9 (09 Nov 2023 00:25)  T(F): 98.5 (09 Nov 2023 12:01), Max: 98.5 (09 Nov 2023 12:01)  HR: 84 (09 Nov 2023 12:01) (76 - 84)  BP: 95/56 (09 Nov 2023 12:01) (95/56 - 115/62)  BP(mean): --  RR: 18 (09 Nov 2023 12:01) (18 - 18)  SpO2: 97% (09 Nov 2023 12:01) (95% - 98%)    Parameters below as of 09 Nov 2023 12:01  Patient On (Oxygen Delivery Method): room air        PHYSICAL EXAM:  GENERAL: NAD, well-groomed, well-developed  HEAD:  Atraumatic, Normocephalic  EYES: EOMI, PERRLA, conjunctiva and sclera clear  ENMT: No tonsillar erythema, exudates, or enlargement; Moist mucous membranes, Good dentition, No lesions  NECK: Supple, No JVD, Normal thyroid  NERVOUS SYSTEM:  Alert & Oriented X3, Good concentration; Motor Strength 5/5 B/L upper and lower extremities; DTRs 2+ intact and symmetric  CHEST/LUNG: Clear to percussion bilaterally; No rales, rhonchi, wheezing, or rubs  HEART: Regular rate and rhythm; No murmurs, rubs, or gallops  ABDOMEN: Soft, Nontender, Nondistended; Bowel sounds present  EXTREMITIES:  2+ Peripheral Pulses, No clubbing, cyanosis, or edema  LYMPH: No lymphadenopathy noted  SKIN: No rashes or lesions    LABS:                         8.2    4.90  )-----------( 66       ( 09 Nov 2023 06:58 )             24.5   11-09    138  |  108  |  34<H>  ----------------------------<  100<H>  4.0   |  23  |  1.00    Ca    8.5      09 Nov 2023 06:58

## 2023-11-09 NOTE — PROGRESS NOTE ADULT - SUBJECTIVE AND OBJECTIVE BOX
DATE OF SERVICE: 11-09-23 @ 14:45    Patient is a 88y old  Male who presents with a chief complaint of Left hip fx (08 Nov 2023 09:54)      INTERVAL HISTORY: Feels ok.     REVIEW OF SYSTEMS:  CONSTITUTIONAL: No weakness  EYES/ENT: No visual changes;  No throat pain   NECK: No pain or stiffness  RESPIRATORY: No cough, wheezing; No shortness of breath  CARDIOVASCULAR: No chest pain or palpitations  GASTROINTESTINAL: No abdominal  pain. No nausea, vomiting, or hematemesis  GENITOURINARY: No dysuria, frequency or hematuria  NEUROLOGICAL: No stroke like symptoms  SKIN: No rashes    	  MEDICATIONS:        PHYSICAL EXAM:  T(C): 36.9 (11-09-23 @ 12:01), Max: 36.9 (11-09-23 @ 00:25)  HR: 84 (11-09-23 @ 12:01) (76 - 84)  BP: 95/56 (11-09-23 @ 12:01) (95/56 - 115/62)  RR: 18 (11-09-23 @ 12:01) (18 - 18)  SpO2: 97% (11-09-23 @ 12:01) (95% - 98%)  Wt(kg): --  I&O's Summary    08 Nov 2023 07:01  -  09 Nov 2023 07:00  --------------------------------------------------------  IN: 250 mL / OUT: 1350 mL / NET: -1100 mL          Appearance: In no distress	  HEENT:    PERRL, EOMI	  Cardiovascular:  S1 S2, No JVD  Respiratory: Lungs clear to auscultation	  Gastrointestinal:  Soft, Non-tender, + BS	  Vascularature:  No edema of LE  Psychiatric: Appropriate affect   Neuro: no acute focal deficits                               8.2    4.90  )-----------( 66       ( 09 Nov 2023 06:58 )             24.5     11-09    138  |  108  |  34<H>  ----------------------------<  100<H>  4.0   |  23  |  1.00    Ca    8.5      09 Nov 2023 06:58          Labs personally reviewed      ASSESSMENT/PLAN: 	    87M h/o BPH, HLP, hypothyroid, CAD s/p CABG x5 (2006) and cardiac stents x2 (06/2021), prostate cancer s/p radiation therapy and immunotherapy, JOSH, hypothyroid and borderline DM, L3L5 lamiectomy, L4L5 fusion bibems following a fall.  Patient states  that he was walking in his home when he slipped on a magazine that was on the floor–fell onto the linoleum floor.  Is endorsing pain to his left upper leg and hip.  Denies head strike or loss of consciousness.       1. Problem/Plan  Problem: Cardiac Risk Stratification  - EKG with no ischemia noted  - Patient not in decompensated HF  - No hx of tachy/rochelle syndrome  - Patient and family report good functional capicity  - Patient is mod risk for mod risk surgical procedure. No contraindication to proceed   - Tolerated surgery well.     2. Problem/Plan  Problem: Hypertension  - Patient reports BP regimen discontinued by OP cards given hypotension    3. Problem/Plan  Problem: Coronary Artery Disease  - Hx of CABG in 2006  - Hx of Cardiac stents x2 in 2021  - Continue ASA daily   - Continue Statin, currently on simvastatin 20mg every other day. Can transition to Atorvastatin 10mg every other day    4. Problem/Plan  Problem: : Need for prophylactic measure.   ·  Plan: Xarelto 10mg PO daily        RAY Balderas-YESENIA De León DO [facility]  Cardiovascular Medicine  [address] [place] [address], [address]  Available through call or text on [employer] TE[employer]  Office: [phone]

## 2023-11-10 LAB
ANION GAP SERPL CALC-SCNC: 12 MMOL/L — SIGNIFICANT CHANGE UP (ref 5–17)
ANION GAP SERPL CALC-SCNC: 12 MMOL/L — SIGNIFICANT CHANGE UP (ref 5–17)
BLD GP AB SCN SERPL QL: NEGATIVE — SIGNIFICANT CHANGE UP
BLD GP AB SCN SERPL QL: NEGATIVE — SIGNIFICANT CHANGE UP
BUN SERPL-MCNC: 24 MG/DL — HIGH (ref 7–23)
BUN SERPL-MCNC: 24 MG/DL — HIGH (ref 7–23)
CALCIUM SERPL-MCNC: 8.7 MG/DL — SIGNIFICANT CHANGE UP (ref 8.4–10.5)
CALCIUM SERPL-MCNC: 8.7 MG/DL — SIGNIFICANT CHANGE UP (ref 8.4–10.5)
CHLORIDE SERPL-SCNC: 105 MMOL/L — SIGNIFICANT CHANGE UP (ref 96–108)
CHLORIDE SERPL-SCNC: 105 MMOL/L — SIGNIFICANT CHANGE UP (ref 96–108)
CO2 SERPL-SCNC: 22 MMOL/L — SIGNIFICANT CHANGE UP (ref 22–31)
CO2 SERPL-SCNC: 22 MMOL/L — SIGNIFICANT CHANGE UP (ref 22–31)
CREAT SERPL-MCNC: 0.8 MG/DL — SIGNIFICANT CHANGE UP (ref 0.5–1.3)
CREAT SERPL-MCNC: 0.8 MG/DL — SIGNIFICANT CHANGE UP (ref 0.5–1.3)
EGFR: 86 ML/MIN/1.73M2 — SIGNIFICANT CHANGE UP
EGFR: 86 ML/MIN/1.73M2 — SIGNIFICANT CHANGE UP
GLUCOSE BLDC GLUCOMTR-MCNC: 118 MG/DL — HIGH (ref 70–99)
GLUCOSE BLDC GLUCOMTR-MCNC: 118 MG/DL — HIGH (ref 70–99)
GLUCOSE BLDC GLUCOMTR-MCNC: 119 MG/DL — HIGH (ref 70–99)
GLUCOSE BLDC GLUCOMTR-MCNC: 119 MG/DL — HIGH (ref 70–99)
GLUCOSE BLDC GLUCOMTR-MCNC: 129 MG/DL — HIGH (ref 70–99)
GLUCOSE BLDC GLUCOMTR-MCNC: 129 MG/DL — HIGH (ref 70–99)
GLUCOSE BLDC GLUCOMTR-MCNC: 141 MG/DL — HIGH (ref 70–99)
GLUCOSE BLDC GLUCOMTR-MCNC: 141 MG/DL — HIGH (ref 70–99)
GLUCOSE SERPL-MCNC: 100 MG/DL — HIGH (ref 70–99)
GLUCOSE SERPL-MCNC: 100 MG/DL — HIGH (ref 70–99)
HCT VFR BLD CALC: 28.8 % — LOW (ref 39–50)
HCT VFR BLD CALC: 28.8 % — LOW (ref 39–50)
HGB BLD-MCNC: 9.7 G/DL — LOW (ref 13–17)
HGB BLD-MCNC: 9.7 G/DL — LOW (ref 13–17)
MCHC RBC-ENTMCNC: 31.3 PG — SIGNIFICANT CHANGE UP (ref 27–34)
MCHC RBC-ENTMCNC: 31.3 PG — SIGNIFICANT CHANGE UP (ref 27–34)
MCHC RBC-ENTMCNC: 33.7 GM/DL — SIGNIFICANT CHANGE UP (ref 32–36)
MCHC RBC-ENTMCNC: 33.7 GM/DL — SIGNIFICANT CHANGE UP (ref 32–36)
MCV RBC AUTO: 92.9 FL — SIGNIFICANT CHANGE UP (ref 80–100)
MCV RBC AUTO: 92.9 FL — SIGNIFICANT CHANGE UP (ref 80–100)
NRBC # BLD: 0 /100 WBCS — SIGNIFICANT CHANGE UP (ref 0–0)
NRBC # BLD: 0 /100 WBCS — SIGNIFICANT CHANGE UP (ref 0–0)
PLATELET # BLD AUTO: 76 K/UL — LOW (ref 150–400)
PLATELET # BLD AUTO: 76 K/UL — LOW (ref 150–400)
POTASSIUM SERPL-MCNC: 4.1 MMOL/L — SIGNIFICANT CHANGE UP (ref 3.5–5.3)
POTASSIUM SERPL-MCNC: 4.1 MMOL/L — SIGNIFICANT CHANGE UP (ref 3.5–5.3)
POTASSIUM SERPL-SCNC: 4.1 MMOL/L — SIGNIFICANT CHANGE UP (ref 3.5–5.3)
POTASSIUM SERPL-SCNC: 4.1 MMOL/L — SIGNIFICANT CHANGE UP (ref 3.5–5.3)
RBC # BLD: 3.1 M/UL — LOW (ref 4.2–5.8)
RBC # BLD: 3.1 M/UL — LOW (ref 4.2–5.8)
RBC # FLD: 14.6 % — HIGH (ref 10.3–14.5)
RBC # FLD: 14.6 % — HIGH (ref 10.3–14.5)
RH IG SCN BLD-IMP: POSITIVE — SIGNIFICANT CHANGE UP
RH IG SCN BLD-IMP: POSITIVE — SIGNIFICANT CHANGE UP
SODIUM SERPL-SCNC: 139 MMOL/L — SIGNIFICANT CHANGE UP (ref 135–145)
SODIUM SERPL-SCNC: 139 MMOL/L — SIGNIFICANT CHANGE UP (ref 135–145)
WBC # BLD: 3.85 K/UL — SIGNIFICANT CHANGE UP (ref 3.8–10.5)
WBC # BLD: 3.85 K/UL — SIGNIFICANT CHANGE UP (ref 3.8–10.5)
WBC # FLD AUTO: 3.85 K/UL — SIGNIFICANT CHANGE UP (ref 3.8–10.5)
WBC # FLD AUTO: 3.85 K/UL — SIGNIFICANT CHANGE UP (ref 3.8–10.5)

## 2023-11-10 RX ADMIN — Medication 100 MICROGRAM(S): at 05:32

## 2023-11-10 RX ADMIN — TRAMADOL HYDROCHLORIDE 50 MILLIGRAM(S): 50 TABLET ORAL at 11:10

## 2023-11-10 RX ADMIN — Medication 5 MILLIGRAM(S): at 17:11

## 2023-11-10 RX ADMIN — TAMSULOSIN HYDROCHLORIDE 0.4 MILLIGRAM(S): 0.4 CAPSULE ORAL at 21:45

## 2023-11-10 RX ADMIN — DICLOFENAC SODIUM 75 MILLIGRAM(S): 75 TABLET, DELAYED RELEASE ORAL at 21:46

## 2023-11-10 RX ADMIN — RIVAROXABAN 10 MILLIGRAM(S): KIT at 11:37

## 2023-11-10 RX ADMIN — Medication 2000 UNIT(S): at 11:36

## 2023-11-10 RX ADMIN — TRAMADOL HYDROCHLORIDE 50 MILLIGRAM(S): 50 TABLET ORAL at 12:15

## 2023-11-10 RX ADMIN — SENNA PLUS 2 TABLET(S): 8.6 TABLET ORAL at 21:45

## 2023-11-10 RX ADMIN — Medication 5 MILLIGRAM(S): at 05:32

## 2023-11-10 RX ADMIN — POLYETHYLENE GLYCOL 3350 17 GRAM(S): 17 POWDER, FOR SOLUTION ORAL at 11:36

## 2023-11-10 RX ADMIN — DICLOFENAC SODIUM 75 MILLIGRAM(S): 75 TABLET, DELAYED RELEASE ORAL at 22:46

## 2023-11-10 RX ADMIN — PANTOPRAZOLE SODIUM 40 MILLIGRAM(S): 20 TABLET, DELAYED RELEASE ORAL at 05:32

## 2023-11-10 RX ADMIN — Medication 81 MILLIGRAM(S): at 11:37

## 2023-11-10 NOTE — PROGRESS NOTE ADULT - SUBJECTIVE AND OBJECTIVE BOX
DATE OF SERVICE: 11-10-23 @ 16:10    Patient is a 88y old  Male who presents with a chief complaint of Left hip fx (08 Nov 2023 09:54)      INTERVAL HISTORY: Feels ok.     REVIEW OF SYSTEMS:  CONSTITUTIONAL: No weakness  EYES/ENT: No visual changes;  No throat pain   NECK: No pain or stiffness  RESPIRATORY: No cough, wheezing; No shortness of breath  CARDIOVASCULAR: No chest pain or palpitations  GASTROINTESTINAL: No abdominal  pain. No nausea, vomiting, or hematemesis  GENITOURINARY: No dysuria, frequency or hematuria  NEUROLOGICAL: No stroke like symptoms  SKIN: No rashes    	  MEDICATIONS:        PHYSICAL EXAM:  T(C): 36.8 (11-10-23 @ 12:22), Max: 37.2 (11-09-23 @ 21:17)  HR: 66 (11-10-23 @ 12:22) (66 - 93)  BP: 116/60 (11-10-23 @ 12:22) (112/68 - 125/69)  RR: 18 (11-10-23 @ 12:22) (18 - 19)  SpO2: 96% (11-10-23 @ 12:22) (95% - 97%)  Wt(kg): --  I&O's Summary    09 Nov 2023 07:01  -  10 Nov 2023 07:00  --------------------------------------------------------  IN: 1180 mL / OUT: 1300 mL / NET: -120 mL    10 Nov 2023 07:01  -  10 Nov 2023 16:10  --------------------------------------------------------  IN: 480 mL / OUT: 1100 mL / NET: -620 mL          Appearance: In no distress	  HEENT:    PERRL, EOMI	  Cardiovascular:  S1 S2, No JVD  Respiratory: Lungs clear to auscultation	  Gastrointestinal:  Soft, Non-tender, + BS	  Vascularature:  No edema of LE  Psychiatric: Appropriate affect   Neuro: no acute focal deficits                               9.7    3.85  )-----------( 76       ( 10 Nov 2023 06:58 )             28.8     11-10    139  |  105  |  24<H>  ----------------------------<  100<H>  4.1   |  22  |  0.80    Ca    8.7      10 Nov 2023 06:57          Labs personally reviewed      ASSESSMENT/PLAN: 	      87M h/o BPH, HLP, hypothyroid, CAD s/p CABG x5 (2006) and cardiac stents x2 (06/2021), prostate cancer s/p radiation therapy and immunotherapy, JOSH, hypothyroid and borderline DM, L3L5 lamiectomy, L4L5 fusion bibems following a fall.  Patient states  that he was walking in his home when he slipped on a magazine that was on the floor–fell onto the linoleum floor.  Is endorsing pain to his left upper leg and hip.  Denies head strike or loss of consciousness.       1. Problem/Plan  Problem: Cardiac Risk Stratification  - EKG with no ischemia noted  - Patient not in decompensated HF  - No hx of tachy/rochelle syndrome  - Patient and family report good functional capicity  - Patient is mod risk for mod risk surgical procedure. No contraindication to proceed   - Tolerated surgery well.     2. Problem/Plan  Problem: Hypertension  - Patient reports BP regimen discontinued by OP cards given hypotension    3. Problem/Plan  Problem: Coronary Artery Disease  - Hx of CABG in 2006  - Hx of Cardiac stents x2 in 2021  - Continue ASA daily   - Continue Statin, currently on simvastatin 20mg every other day. Can transition to Atorvastatin 10mg every other day    4. Problem/Plan  Problem: : Need for prophylactic measure.   ·  Plan: Xarelto 10mg PO daily          RAY Balderas-NP   Jordan De León DO [facility]  Cardiovascular Medicine  [address] [facility], [address]  Available through call or text on [employer] TEAM[employer]  Office: [phone]   DATE OF SERVICE: 11-10-23 @ 16:10    Patient is a 88y old  Male who presents with a chief complaint of Left hip fx (08 Nov 2023 09:54)      INTERVAL HISTORY: Feels ok.     REVIEW OF SYSTEMS:  CONSTITUTIONAL: No weakness  EYES/ENT: No visual changes;  No throat pain   NECK: No pain or stiffness  RESPIRATORY: No cough, wheezing; No shortness of breath  CARDIOVASCULAR: No chest pain or palpitations  GASTROINTESTINAL: No abdominal  pain. No nausea, vomiting, or hematemesis  GENITOURINARY: No dysuria, frequency or hematuria  NEUROLOGICAL: No stroke like symptoms  SKIN: No rashes    	  MEDICATIONS:        PHYSICAL EXAM:  T(C): 36.8 (11-10-23 @ 12:22), Max: 37.2 (11-09-23 @ 21:17)  HR: 66 (11-10-23 @ 12:22) (66 - 93)  BP: 116/60 (11-10-23 @ 12:22) (112/68 - 125/69)  RR: 18 (11-10-23 @ 12:22) (18 - 19)  SpO2: 96% (11-10-23 @ 12:22) (95% - 97%)  Wt(kg): --  I&O's Summary    09 Nov 2023 07:01  -  10 Nov 2023 07:00  --------------------------------------------------------  IN: 1180 mL / OUT: 1300 mL / NET: -120 mL    10 Nov 2023 07:01  -  10 Nov 2023 16:10  --------------------------------------------------------  IN: 480 mL / OUT: 1100 mL / NET: -620 mL          Appearance: In no distress	  HEENT:    PERRL, EOMI	  Cardiovascular:  S1 S2, No JVD  Respiratory: Lungs clear to auscultation	  Gastrointestinal:  Soft, Non-tender, + BS	  Vascularature:  No edema of LE  Psychiatric: Appropriate affect   Neuro: no acute focal deficits                               9.7    3.85  )-----------( 76       ( 10 Nov 2023 06:58 )             28.8     11-10    139  |  105  |  24<H>  ----------------------------<  100<H>  4.1   |  22  |  0.80    Ca    8.7      10 Nov 2023 06:57          Labs personally reviewed      ASSESSMENT/PLAN: 	  87M h/o BPH, HLP, hypothyroid, CAD s/p CABG x5 (2006) and cardiac stents x2 (06/2021), prostate cancer s/p radiation therapy and immunotherapy, JOSH, hypothyroid and borderline DM, L3L5 lamiectomy, L4L5 fusion bibems following a fall.  Patient states  that he was walking in his home when he slipped on a magazine that was on the floor–fell onto the linoleum floor.  Is endorsing pain to his left upper leg and hip.  Denies head strike or loss of consciousness.       1. Problem/Plan  Problem: Cardiac Risk Stratification  - EKG with no ischemia noted  - Patient not in decompensated HF  - No hx of tachy/rochelle syndrome  - Patient and family report good functional capicity  - Patient is mod risk for mod risk surgical procedure. No contraindication to proceed   - Tolerated surgery well.     2. Problem/Plan  Problem: Hypertension  - Patient reports BP regimen discontinued by OP cards given hypotension    3. Problem/Plan  Problem: Coronary Artery Disease  - Hx of CABG in 2006  - Hx of Cardiac stents x2 in 2021  - Continue ASA daily   - Continue Statin, currently on simvastatin 20mg every other day. Can transition to Atorvastatin 10mg every other day    4. Problem/Plan  Problem: : Need for prophylactic measure.   ·  Plan: Xarelto 10mg PO daily          RAY Balderas-NP   Jordan De León DO [facility]  Cardiovascular Medicine  [address] [facility], [address]  Available through call or text on [employer] TEAM[employer]  Office: [phone]

## 2023-11-10 NOTE — PROGRESS NOTE ADULT - SUBJECTIVE AND OBJECTIVE BOX
covering for dr Juárez       89 yo M h/o BPH, HLP, hypothyroid, CAD s/p CABG x5 (2006) and cardiac stents x2 (06/2021), prostate cancer s/p radiation therapy and immunotherapy, JOSH, hypothyroid and borderline DM, L3L5 lamiectomy, L4L5 fusion bibems following a fall.  Patient states  that he was walking in his home when he slipped on a magazine that was on the floor–fell onto the linoleum floor. Patient states he had pain  to the left upper leg and hip.  He could not ambulate and was brought to [facility] for further evaluation and treatement . In the ED he was found to have a left hip fracture and is s/p an Open reduction and internal fixation of the left hip. Patient tolerated the procedure well. pain  has been managed.     MEDICATIONS  (STANDING):  acetaminophen     Tablet .. 975 milliGRAM(s) Oral every 8 hours  aspirin enteric coated 81 milliGRAM(s) Oral daily  atorvastatin 10 milliGRAM(s) Oral <User Schedule>  cholecalciferol 2000 Unit(s) Oral daily  dextrose 5%. 1000 milliLiter(s) (50 mL/Hr) IV Continuous <Continuous>  dextrose 5%. 1000 milliLiter(s) (100 mL/Hr) IV Continuous <Continuous>  dextrose 50% Injectable 25 Gram(s) IV Push once  dextrose 50% Injectable 12.5 Gram(s) IV Push once  dextrose 50% Injectable 25 Gram(s) IV Push once  diclofenac 75 milliGRAM(s) Oral <User Schedule>  glucagon  Injectable 1 milliGRAM(s) IntraMuscular once  insulin lispro (ADMELOG) corrective regimen sliding scale   SubCutaneous three times a day before meals  insulin lispro (ADMELOG) corrective regimen sliding scale   SubCutaneous at bedtime  levothyroxine 100 MICROGram(s) Oral daily  oxybutynin 5 milliGRAM(s) Oral two times a day  pantoprazole    Tablet 40 milliGRAM(s) Oral before breakfast  polyethylene glycol 3350 17 Gram(s) Oral daily  rivaroxaban 10 milliGRAM(s) Oral daily  senna 2 Tablet(s) Oral at bedtime  sodium chloride 0.9%. 1000 milliLiter(s) (125 mL/Hr) IV Continuous <Continuous>  tamsulosin 0.4 milliGRAM(s) Oral at bedtime    MEDICATIONS  (PRN):  dextrose Oral Gel 15 Gram(s) Oral once PRN Blood Glucose LESS THAN 70 milliGRAM(s)/deciliter  melatonin 3 milliGRAM(s) Oral at bedtime PRN Insomnia  oxyCODONE    IR 5 milliGRAM(s) Oral every 4 hours PRN Severe Pain (7 - 10)  oxyCODONE    IR 2.5 milliGRAM(s) Oral every 4 hours PRN Moderate Pain (4 - 6)  traMADol 50 milliGRAM(s) Oral every 6 hours PRN Mild Pain (1 - 3)    Vital Signs Last 24 Hrs  T(C): 36.6 (10 Nov 2023 08:34), Max: 37.2 (09 Nov 2023 21:17)  T(F): 97.8 (10 Nov 2023 08:34), Max: 98.9 (09 Nov 2023 21:17)  HR: 70 (10 Nov 2023 08:34) (70 - 93)  BP: 124/63 (10 Nov 2023 08:34) (95/56 - 149/73)  BP(mean): --  RR: 18 (10 Nov 2023 08:34) (18 - 19)  SpO2: 97% (10 Nov 2023 08:34) (95% - 97%)    Parameters below as of 10 Nov 2023 08:34  Patient On (Oxygen Delivery Method): room air        PHYSICAL EXAM:  GENERAL: NAD, well-groomed, well-developed  HEAD:  Atraumatic, Normocephalic  EYES: EOMI, PERRLA, conjunctiva and sclera clear  ENMT: No tonsillar erythema, exudates, or enlargement; Moist mucous membranes, Good dentition, No lesions  NECK: Supple, No JVD, Normal thyroid  NERVOUS SYSTEM:  Alert & Oriented X3, Good concentration; Motor Strength 5/5 B/L upper and lower extremities; DTRs 2+ intact and symmetric  CHEST/LUNG: Clear to percussion bilaterally; No rales, rhonchi, wheezing, or rubs  HEART: Regular rate and rhythm; No murmurs, rubs, or gallops  ABDOMEN: Soft, Nontender, Nondistended; Bowel sounds present  EXTREMITIES:  2+ Peripheral Pulses, No clubbing, cyanosis, or edema  LYMPH: No lymphadenopathy noted  SKIN: No rashes or lesions    LABS:                                               9.7    3.85  )-----------( 76       ( 10 Nov 2023 06:58 )             28.8   11-10    139  |  105  |  24<H>  ----------------------------<  100<H>  4.1   |  22  |  0.80    Ca    8.7      10 Nov 2023 06:57

## 2023-11-10 NOTE — PROGRESS NOTE ADULT - SUBJECTIVE AND OBJECTIVE BOX
Pt seen/examined. Doing well. Pain controlled. No acute overnight complaints or events.    Vital Signs Last 24 Hrs  T(C): 37 (10 Nov 2023 04:35), Max: 37.2 (09 Nov 2023 21:17)  T(F): 98.6 (10 Nov 2023 04:35), Max: 98.9 (09 Nov 2023 21:17)  HR: 70 (10 Nov 2023 04:35) (70 - 93)  BP: 112/68 (10 Nov 2023 04:35) (95/56 - 149/73)  BP(mean): --  RR: 18 (10 Nov 2023 04:35) (18 - 19)  SpO2: 96% (10 Nov 2023 04:35) (95% - 97%)    Parameters below as of 10 Nov 2023 04:35  Patient On (Oxygen Delivery Method): room air                              8.2    4.90  )-----------( 66       ( 09 Nov 2023 06:58 )             24.5       11-09    138  |  108  |  34<H>  ----------------------------<  100<H>  4.0   |  23  |  1.00    Ca    8.5      09 Nov 2023 06:58          - Gen: NAD    Exam:  General: A&Ox3, NAD, resting comfortably in bed.  Laterality: LLE     Dsg/tegaderm x3 are clean, dry, and intact       Sensation intact to light touch     2+ DP pulse     (+) DF/PF/EHL/FHL 5/5  Calves soft, nontender bilaterally      A/P: 88y Male s/p Left Femur IM Nailing    -Pain management  -IS encouraged  -Ice/elevation  -DVT ppx: Xarelto 10mg QD to start early amb/oob. A81  -PT/OT: WBAT LLE  -f/u AM labs  -DM     -low dose insulin sliding scale with meals and at bedtime  -Appreciate medicine and cardiology recs  -Discharge planning: home

## 2023-11-11 ENCOUNTER — TRANSCRIPTION ENCOUNTER (OUTPATIENT)
Age: 88
End: 2023-11-11

## 2023-11-11 VITALS
SYSTOLIC BLOOD PRESSURE: 124 MMHG | OXYGEN SATURATION: 95 % | RESPIRATION RATE: 18 BRPM | DIASTOLIC BLOOD PRESSURE: 66 MMHG | TEMPERATURE: 98 F | HEART RATE: 77 BPM

## 2023-11-11 LAB
ANION GAP SERPL CALC-SCNC: 11 MMOL/L — SIGNIFICANT CHANGE UP (ref 5–17)
ANION GAP SERPL CALC-SCNC: 11 MMOL/L — SIGNIFICANT CHANGE UP (ref 5–17)
BUN SERPL-MCNC: 33 MG/DL — HIGH (ref 7–23)
BUN SERPL-MCNC: 33 MG/DL — HIGH (ref 7–23)
CALCIUM SERPL-MCNC: 8.8 MG/DL — SIGNIFICANT CHANGE UP (ref 8.4–10.5)
CALCIUM SERPL-MCNC: 8.8 MG/DL — SIGNIFICANT CHANGE UP (ref 8.4–10.5)
CHLORIDE SERPL-SCNC: 105 MMOL/L — SIGNIFICANT CHANGE UP (ref 96–108)
CHLORIDE SERPL-SCNC: 105 MMOL/L — SIGNIFICANT CHANGE UP (ref 96–108)
CO2 SERPL-SCNC: 23 MMOL/L — SIGNIFICANT CHANGE UP (ref 22–31)
CO2 SERPL-SCNC: 23 MMOL/L — SIGNIFICANT CHANGE UP (ref 22–31)
CREAT SERPL-MCNC: 1.09 MG/DL — SIGNIFICANT CHANGE UP (ref 0.5–1.3)
CREAT SERPL-MCNC: 1.09 MG/DL — SIGNIFICANT CHANGE UP (ref 0.5–1.3)
EGFR: 66 ML/MIN/1.73M2 — SIGNIFICANT CHANGE UP
EGFR: 66 ML/MIN/1.73M2 — SIGNIFICANT CHANGE UP
GLUCOSE BLDC GLUCOMTR-MCNC: 128 MG/DL — HIGH (ref 70–99)
GLUCOSE BLDC GLUCOMTR-MCNC: 128 MG/DL — HIGH (ref 70–99)
GLUCOSE BLDC GLUCOMTR-MCNC: 153 MG/DL — HIGH (ref 70–99)
GLUCOSE BLDC GLUCOMTR-MCNC: 153 MG/DL — HIGH (ref 70–99)
GLUCOSE SERPL-MCNC: 103 MG/DL — HIGH (ref 70–99)
GLUCOSE SERPL-MCNC: 103 MG/DL — HIGH (ref 70–99)
HCT VFR BLD CALC: 28.8 % — LOW (ref 39–50)
HCT VFR BLD CALC: 28.8 % — LOW (ref 39–50)
HGB BLD-MCNC: 9.4 G/DL — LOW (ref 13–17)
HGB BLD-MCNC: 9.4 G/DL — LOW (ref 13–17)
MCHC RBC-ENTMCNC: 30.4 PG — SIGNIFICANT CHANGE UP (ref 27–34)
MCHC RBC-ENTMCNC: 30.4 PG — SIGNIFICANT CHANGE UP (ref 27–34)
MCHC RBC-ENTMCNC: 32.6 GM/DL — SIGNIFICANT CHANGE UP (ref 32–36)
MCHC RBC-ENTMCNC: 32.6 GM/DL — SIGNIFICANT CHANGE UP (ref 32–36)
MCV RBC AUTO: 93.2 FL — SIGNIFICANT CHANGE UP (ref 80–100)
MCV RBC AUTO: 93.2 FL — SIGNIFICANT CHANGE UP (ref 80–100)
NRBC # BLD: 0 /100 WBCS — SIGNIFICANT CHANGE UP (ref 0–0)
NRBC # BLD: 0 /100 WBCS — SIGNIFICANT CHANGE UP (ref 0–0)
PLATELET # BLD AUTO: 96 K/UL — LOW (ref 150–400)
PLATELET # BLD AUTO: 96 K/UL — LOW (ref 150–400)
POTASSIUM SERPL-MCNC: 4.2 MMOL/L — SIGNIFICANT CHANGE UP (ref 3.5–5.3)
POTASSIUM SERPL-MCNC: 4.2 MMOL/L — SIGNIFICANT CHANGE UP (ref 3.5–5.3)
POTASSIUM SERPL-SCNC: 4.2 MMOL/L — SIGNIFICANT CHANGE UP (ref 3.5–5.3)
POTASSIUM SERPL-SCNC: 4.2 MMOL/L — SIGNIFICANT CHANGE UP (ref 3.5–5.3)
RBC # BLD: 3.09 M/UL — LOW (ref 4.2–5.8)
RBC # BLD: 3.09 M/UL — LOW (ref 4.2–5.8)
RBC # FLD: 14.5 % — SIGNIFICANT CHANGE UP (ref 10.3–14.5)
RBC # FLD: 14.5 % — SIGNIFICANT CHANGE UP (ref 10.3–14.5)
SODIUM SERPL-SCNC: 139 MMOL/L — SIGNIFICANT CHANGE UP (ref 135–145)
SODIUM SERPL-SCNC: 139 MMOL/L — SIGNIFICANT CHANGE UP (ref 135–145)
WBC # BLD: 3.38 K/UL — LOW (ref 3.8–10.5)
WBC # BLD: 3.38 K/UL — LOW (ref 3.8–10.5)
WBC # FLD AUTO: 3.38 K/UL — LOW (ref 3.8–10.5)
WBC # FLD AUTO: 3.38 K/UL — LOW (ref 3.8–10.5)

## 2023-11-11 RX ORDER — ASPIRIN/CALCIUM CARB/MAGNESIUM 324 MG
1 TABLET ORAL
Qty: 0 | Refills: 0 | DISCHARGE
Start: 2023-11-11

## 2023-11-11 RX ORDER — PANTOPRAZOLE SODIUM 20 MG/1
1 TABLET, DELAYED RELEASE ORAL
Qty: 10 | Refills: 0
Start: 2023-11-11 | End: 2023-11-20

## 2023-11-11 RX ORDER — ACETAMINOPHEN 500 MG
3 TABLET ORAL
Qty: 126 | Refills: 0
Start: 2023-11-11 | End: 2023-11-24

## 2023-11-11 RX ORDER — OXYCODONE HYDROCHLORIDE 5 MG/1
1 TABLET ORAL
Qty: 28 | Refills: 0
Start: 2023-11-11 | End: 2023-11-17

## 2023-11-11 RX ORDER — NALOXONE HYDROCHLORIDE 4 MG/.1ML
4 SPRAY NASAL
Qty: 2 | Refills: 0
Start: 2023-11-11

## 2023-11-11 RX ORDER — OXYCODONE HYDROCHLORIDE 5 MG/1
1 TABLET ORAL
Qty: 42 | Refills: 0
Start: 2023-11-11 | End: 2023-11-17

## 2023-11-11 RX ORDER — ASPIRIN/CALCIUM CARB/MAGNESIUM 324 MG
1 TABLET ORAL
Qty: 14 | Refills: 0
Start: 2023-11-11 | End: 2023-11-24

## 2023-11-11 RX ORDER — TRAMADOL HYDROCHLORIDE 50 MG/1
1 TABLET ORAL
Qty: 21 | Refills: 0
Start: 2023-11-11 | End: 2023-11-17

## 2023-11-11 RX ORDER — OXYCODONE HYDROCHLORIDE 5 MG/1
1 TABLET ORAL
Qty: 14 | Refills: 0
Start: 2023-11-11 | End: 2023-11-17

## 2023-11-11 RX ORDER — RIVAROXABAN 15 MG-20MG
1 KIT ORAL
Qty: 38 | Refills: 0
Start: 2023-11-11 | End: 2023-12-18

## 2023-11-11 RX ORDER — ASPIRIN/CALCIUM CARB/MAGNESIUM 324 MG
1 TABLET ORAL
Qty: 0 | Refills: 0 | DISCHARGE

## 2023-11-11 RX ADMIN — Medication 5 MILLIGRAM(S): at 05:24

## 2023-11-11 RX ADMIN — Medication 5 MILLIGRAM(S): at 17:29

## 2023-11-11 RX ADMIN — TRAMADOL HYDROCHLORIDE 50 MILLIGRAM(S): 50 TABLET ORAL at 18:29

## 2023-11-11 RX ADMIN — Medication 100 MICROGRAM(S): at 05:25

## 2023-11-11 RX ADMIN — Medication 2000 UNIT(S): at 12:06

## 2023-11-11 RX ADMIN — RIVAROXABAN 10 MILLIGRAM(S): KIT at 12:06

## 2023-11-11 RX ADMIN — POLYETHYLENE GLYCOL 3350 17 GRAM(S): 17 POWDER, FOR SOLUTION ORAL at 12:06

## 2023-11-11 RX ADMIN — PANTOPRAZOLE SODIUM 40 MILLIGRAM(S): 20 TABLET, DELAYED RELEASE ORAL at 05:24

## 2023-11-11 RX ADMIN — TRAMADOL HYDROCHLORIDE 50 MILLIGRAM(S): 50 TABLET ORAL at 17:29

## 2023-11-11 RX ADMIN — Medication 81 MILLIGRAM(S): at 12:06

## 2023-11-11 NOTE — PROGRESS NOTE ADULT - PROVIDER SPECIALTY LIST ADULT
Cardiology
Cardiology
Orthopedics
Orthopedics
Cardiology
Cardiology
Orthopedics
Orthopedics
Internal Medicine

## 2023-11-11 NOTE — PROGRESS NOTE ADULT - PROBLEM SELECTOR PLAN 4
Patient with hx pf prostate CA treated with RT  continue tamsulosin and oxybutynin  follow for signs of retention.

## 2023-11-11 NOTE — DISCHARGE NOTE NURSING/CASE MANAGEMENT/SOCIAL WORK - INFLUENZA IMMUNIZATION DATE (APPROXIMATE):
BEN and sent [facility] to schedule appt
Due for follow-up
Last VISIT 04/26/22    Last CPE 04/26/22     Last REFILL 08/23/22 qty 90 w/0 refills    Last LABS 04/26/22 CPE labs done    No future appointments. Per PROTOCOL? Not on protocol      Please Approve or Deny.
01-Nov-2022

## 2023-11-11 NOTE — PROGRESS NOTE ADULT - SUBJECTIVE AND OBJECTIVE BOX
DATE OF SERVICE: 11-11-23 @ 15:17    Patient is a 88y old  Male who presents with a chief complaint of Left hip fracture (11 Nov 2023 07:46)      INTERVAL HISTORY:     REVIEW OF SYSTEMS:  CONSTITUTIONAL: No weakness  EYES/ENT: No visual changes;  No throat pain   NECK: No pain or stiffness  RESPIRATORY: No cough, wheezing; No shortness of breath  CARDIOVASCULAR: No chest pain or palpitations  GASTROINTESTINAL: No abdominal  pain. No nausea, vomiting, or hematemesis  GENITOURINARY: No dysuria, frequency or hematuria  NEUROLOGICAL: No stroke like symptoms  SKIN: No rashes    TELEMETRY Personally reviewed:  	  MEDICATIONS:        PHYSICAL EXAM:  T(C): 36.6 (11-11-23 @ 12:04), Max: 36.7 (11-10-23 @ 21:26)  HR: 77 (11-11-23 @ 12:04) (65 - 80)  BP: 124/66 (11-11-23 @ 12:04) (108/59 - 124/66)  RR: 18 (11-11-23 @ 12:04) (18 - 18)  SpO2: 95% (11-11-23 @ 12:04) (95% - 100%)  Wt(kg): --  I&O's Summary    10 Nov 2023 07:01  -  11 Nov 2023 07:00  --------------------------------------------------------  IN: 1080 mL / OUT: 1700 mL / NET: -620 mL    11 Nov 2023 07:01  -  11 Nov 2023 15:17  --------------------------------------------------------  IN: 660 mL / OUT: 600 mL / NET: 60 mL          Appearance: In no distress	  HEENT:    PERRL, EOMI	  Cardiovascular:  S1 S2, No JVD  Respiratory: Lungs clear to auscultation	  Gastrointestinal:  Soft, Non-tender, + BS	  Vascularature:  No edema of LE  Psychiatric: Appropriate affect   Neuro: no acute focal deficits                               9.4    3.38  )-----------( 96       ( 11 Nov 2023 07:12 )             28.8     11-11    139  |  105  |  33<H>  ----------------------------<  103<H>  4.2   |  23  |  1.09    Ca    8.8      11 Nov 2023 07:09          Labs personally reviewed      ASSESSMENT/PLAN: 	  87M h/o BPH, HLP, hypothyroid, CAD s/p CABG x5 (2006) and cardiac stents x2 (06/2021), prostate cancer s/p radiation therapy and immunotherapy, JOSH, hypothyroid and borderline DM, L3L5 lamiectomy, L4L5 fusion bibems following a fall.  Patient states  that he was walking in his home when he slipped on a magazine that was on the floor–fell onto the linoleum floor.  Is endorsing pain to his left upper leg and hip.  Denies head strike or loss of consciousness.       1. Problem/Plan  Problem: Cardiac Risk Stratification  - EKG with no ischemia noted  - Patient not in decompensated HF  - No hx of tachy/rochelle syndrome  - Patient and family report good functional capicity  - Patient is mod risk for mod risk surgical procedure. No contraindication to proceed   - Tolerated surgery well.     2. Problem/Plan  Problem: Hypertension  - Patient reports BP regimen discontinued by OP cards given hypotension    3. Problem/Plan  Problem: Coronary Artery Disease  - Hx of CABG in 2006  - Hx of Cardiac stents x2 in 2021  - Continue ASA daily   - Continue Statin, currently on simvastatin 20mg every other day. Can transition to Atorvastatin 10mg every other day    4. Problem/Plan  Problem: : Need for prophylactic measure.   ·  Plan: Xarelto 10mg PO daily        YESENIA Saha DO [facility]  Cardiovascular Medicine  [address] [facility], [address]  Available through call or text on [employer]  Office: [phone]

## 2023-11-11 NOTE — PROGRESS NOTE ADULT - ASSESSMENT
89 y/o M s/p left hip fracture fixation with IM nail POD#4, d/c home once all DMEs delivered  Kita Moreno PA-C  Orthopaedic Surgery  Team pager [phone]  [facility] [phone]  [phone]  
89 yo male present s after a fall at home with a left hip fracture. Patient is s/p an Open reduction and internal fixation of the left hip
89 yo male present s after a fall at home with a left hip fracture. Patient is s/p an Open reduction and internal fixation of the left hip
87 yo male present s after a fall at home with a left hip fracture. Patient is s/p an Open reduction and internal fixation of the left hip
89 yo male present s after a fall at home with a left hip fracture. Patient is s/p an Open reduction and internal fixation of the left hip

## 2023-11-11 NOTE — PROGRESS NOTE ADULT - PROBLEM SELECTOR PLAN 2
Patient with a hx of CAD and PTCA with stent placement by Dr Shepard at [facility]  Patient denies any chest pain or palpitations  cardiology following  continue to monitor heart rate and BP.
Patient with a hx of CAD and PTCA with stent placement by Dr Shepard at [facility]  Patient denies any chest pain or palpitations  cardiology following  continue to monitor heart rate and BP.
Patient with a hx of CAD and PTCA with stent placement by Dr Shepard at [facility]  Patient denies any chest pain or palpitations  cardiology following  continue to monitor heart rate and BP.
Patient with a hx of CAD and PTCA with stent placement by Dr Shepard at [facility]  Patient denies any chest pain or palpitations  cardiology following  continue to monitor heart rate and BP.

## 2023-11-11 NOTE — PROGRESS NOTE ADULT - SUBJECTIVE AND OBJECTIVE BOX
covering for dr Juárez       89 yo M h/o BPH, HLP, hypothyroid, CAD s/p CABG x5 (2006) and cardiac stents x2 (06/2021), prostate cancer s/p radiation therapy and immunotherapy, JOSH, hypothyroid and borderline DM, L3L5 lamiectomy, L4L5 fusion bibems following a fall.  Patient states  that he was walking in his home when he slipped on a magazine that was on the floor–fell onto the linoleum floor. Patient states he had pain  to the left upper leg and hip.  He could not ambulate and was brought to [facility] for further evaluation and treatement . In the ED he was found to have a left hip fracture and is s/p an Open reduction and internal fixation of the left hip. Patient tolerated the procedure well. pain  has been managed.     MEDICATIONS  (STANDING):  acetaminophen     Tablet .. 975 milliGRAM(s) Oral every 8 hours  aspirin enteric coated 81 milliGRAM(s) Oral daily  atorvastatin 10 milliGRAM(s) Oral <User Schedule>  cholecalciferol 2000 Unit(s) Oral daily  dextrose 5%. 1000 milliLiter(s) (100 mL/Hr) IV Continuous <Continuous>  dextrose 5%. 1000 milliLiter(s) (50 mL/Hr) IV Continuous <Continuous>  dextrose 50% Injectable 25 Gram(s) IV Push once  dextrose 50% Injectable 12.5 Gram(s) IV Push once  dextrose 50% Injectable 25 Gram(s) IV Push once  diclofenac 75 milliGRAM(s) Oral <User Schedule>  glucagon  Injectable 1 milliGRAM(s) IntraMuscular once  insulin lispro (ADMELOG) corrective regimen sliding scale   SubCutaneous at bedtime  insulin lispro (ADMELOG) corrective regimen sliding scale   SubCutaneous three times a day before meals  levothyroxine 100 MICROGram(s) Oral daily  oxybutynin 5 milliGRAM(s) Oral two times a day  pantoprazole    Tablet 40 milliGRAM(s) Oral before breakfast  polyethylene glycol 3350 17 Gram(s) Oral daily  rivaroxaban 10 milliGRAM(s) Oral daily  senna 2 Tablet(s) Oral at bedtime  sodium chloride 0.9%. 1000 milliLiter(s) (125 mL/Hr) IV Continuous <Continuous>  tamsulosin 0.4 milliGRAM(s) Oral at bedtime    MEDICATIONS  (PRN):  dextrose Oral Gel 15 Gram(s) Oral once PRN Blood Glucose LESS THAN 70 milliGRAM(s)/deciliter  melatonin 3 milliGRAM(s) Oral at bedtime PRN Insomnia  oxyCODONE    IR 2.5 milliGRAM(s) Oral every 4 hours PRN Moderate Pain (4 - 6)  oxyCODONE    IR 5 milliGRAM(s) Oral every 4 hours PRN Severe Pain (7 - 10)  traMADol 50 milliGRAM(s) Oral every 6 hours PRN Mild Pain (1 - 3)    Vital Signs Last 24 Hrs  T(C): 36.6 (11 Nov 2023 12:04), Max: 36.7 (10 Nov 2023 21:26)  T(F): 97.8 (11 Nov 2023 12:04), Max: 98.1 (11 Nov 2023 00:30)  HR: 77 (11 Nov 2023 12:04) (65 - 80)  BP: 124/66 (11 Nov 2023 12:04) (108/59 - 124/66)  BP(mean): --  RR: 18 (11 Nov 2023 12:04) (18 - 18)  SpO2: 95% (11 Nov 2023 12:04) (95% - 100%)    Parameters below as of 11 Nov 2023 12:04  Patient On (Oxygen Delivery Method): room air        PHYSICAL EXAM:  GENERAL: NAD, well-groomed, well-developed  HEAD:  Atraumatic, Normocephalic  EYES: EOMI, PERRLA, conjunctiva and sclera clear  ENMT: No tonsillar erythema, exudates, or enlargement; Moist mucous membranes, Good dentition, No lesions  NECK: Supple, No JVD, Normal thyroid  NERVOUS SYSTEM:  Alert & Oriented X3, Good concentration; Motor Strength 5/5 B/L upper and lower extremities; DTRs 2+ intact and symmetric  CHEST/LUNG: Clear to percussion bilaterally; No rales, rhonchi, wheezing, or rubs  HEART: Regular rate and rhythm; No murmurs, rubs, or gallops  ABDOMEN: Soft, Nontender, Nondistended; Bowel sounds present  EXTREMITIES:  2+ Peripheral Pulses, No clubbing, cyanosis, or edema  LYMPH: No lymphadenopathy noted  SKIN: No rashes or lesions    LABS:                                                         9.4    3.38  )-----------( 96       ( 11 Nov 2023 07:12 )             28.8   11-11    139  |  105  |  33<H>  ----------------------------<  103<H>  4.2   |  23  |  1.09    Ca    8.8      11 Nov 2023 07:09

## 2023-11-11 NOTE — PROGRESS NOTE ADULT - SUBJECTIVE AND OBJECTIVE BOX
Patient is a 88y old  Male who presents with a chief complaint of Left hip fracture  Patient s/p left hip fracture fixation with intermedullary nail POD#4  Patient comfortable  No complaints    T(C): 36.7 (11-11-23 @ 04:10), Max: 36.8 (11-10-23 @ 12:22)  HR: 65 (11-11-23 @ 04:10) (65 - 80)  BP: 110/66 (11-11-23 @ 04:10) (110/66 - 124/63)  RR: 18 (11-11-23 @ 04:10) (18 - 18)  SpO2: 100% (11-11-23 @ 04:10) (95% - 100%)    PHYSICAL EXAM:  NAD, Alert  [Left ] Hip: Dressings C/D/I; sensation grossly intact to light touch; (+) DF/PF; (+) Distal Pulses; No Calf tenderness B/L, PAS     LABS:                     9.7    3.85  )-----------( 76       ( 10 Nov 2023 06:58 )             28.8   11-10  139  |  105  |  24<H>  ----------------------------<  100<H>  4.1   |  22  |  0.80  Ca    8.7      10 Nov 2023 06:57

## 2023-11-11 NOTE — PROGRESS NOTE ADULT - PROBLEM SELECTOR PLAN 3
continue insulin coverage  continue to monitor glucose  will adjust insulin as needed  Consistent carbohydrate diet.

## 2023-11-11 NOTE — DISCHARGE NOTE NURSING/CASE MANAGEMENT/SOCIAL WORK - NSDCPEFALRISK_GEN_ALL_CORE
For information on Fall & Injury Prevention, visit: [email or web address] OR  [email or web address] OR  https://www.cdc.gov/steadi/patient.html

## 2023-11-11 NOTE — PROGRESS NOTE ADULT - PROBLEM SELECTOR PLAN 1
Patient s/p an Open reduction and internal fixation of the left hip  tolerated the procedure well  PO as tolerated  Patient has started working with physical therapy   DVT and GI prophylaxis as per ortho.

## 2023-11-11 NOTE — DISCHARGE NOTE NURSING/CASE MANAGEMENT/SOCIAL WORK - PATIENT PORTAL LINK FT
You can access the FollowMyHealth Patient Portal offered by [facility] by registering at the following website: [email or web address]. By joining [employer]’s FollowMyHealth portal, you will also be able to view your health information using other applications (apps) compatible with our system.

## 2023-12-13 PROCEDURE — 80048 BASIC METABOLIC PNL TOTAL CA: CPT

## 2023-12-13 PROCEDURE — 86923 COMPATIBILITY TEST ELECTRIC: CPT

## 2023-12-13 PROCEDURE — 96374 THER/PROPH/DIAG INJ IV PUSH: CPT

## 2023-12-13 PROCEDURE — 85025 COMPLETE CBC W/AUTO DIFF WBC: CPT

## 2023-12-13 PROCEDURE — 96375 TX/PRO/DX INJ NEW DRUG ADDON: CPT

## 2023-12-13 PROCEDURE — 84295 ASSAY OF SERUM SODIUM: CPT

## 2023-12-13 PROCEDURE — C1713: CPT

## 2023-12-13 PROCEDURE — 85018 HEMOGLOBIN: CPT

## 2023-12-13 PROCEDURE — 97116 GAIT TRAINING THERAPY: CPT

## 2023-12-13 PROCEDURE — 82040 ASSAY OF SERUM ALBUMIN: CPT

## 2023-12-13 PROCEDURE — 73552 X-RAY EXAM OF FEMUR 2/>: CPT

## 2023-12-13 PROCEDURE — 99285 EMERGENCY DEPT VISIT HI MDM: CPT | Mod: 25

## 2023-12-13 PROCEDURE — 73502 X-RAY EXAM HIP UNI 2-3 VIEWS: CPT

## 2023-12-13 PROCEDURE — 86900 BLOOD TYPING SEROLOGIC ABO: CPT

## 2023-12-13 PROCEDURE — 82565 ASSAY OF CREATININE: CPT

## 2023-12-13 PROCEDURE — 76000 FLUOROSCOPY <1 HR PHYS/QHP: CPT

## 2023-12-13 PROCEDURE — 36415 COLL VENOUS BLD VENIPUNCTURE: CPT

## 2023-12-13 PROCEDURE — 82306 VITAMIN D 25 HYDROXY: CPT

## 2023-12-13 PROCEDURE — 85027 COMPLETE CBC AUTOMATED: CPT

## 2023-12-13 PROCEDURE — 97165 OT EVAL LOW COMPLEX 30 MIN: CPT

## 2023-12-13 PROCEDURE — 80053 COMPREHEN METABOLIC PANEL: CPT

## 2023-12-13 PROCEDURE — 84132 ASSAY OF SERUM POTASSIUM: CPT

## 2023-12-13 PROCEDURE — 97530 THERAPEUTIC ACTIVITIES: CPT

## 2023-12-13 PROCEDURE — 82330 ASSAY OF CALCIUM: CPT

## 2023-12-13 PROCEDURE — 85610 PROTHROMBIN TIME: CPT

## 2023-12-13 PROCEDURE — C1769: CPT

## 2023-12-13 PROCEDURE — 82947 ASSAY GLUCOSE BLOOD QUANT: CPT

## 2023-12-13 PROCEDURE — 96376 TX/PRO/DX INJ SAME DRUG ADON: CPT

## 2023-12-13 PROCEDURE — 71045 X-RAY EXAM CHEST 1 VIEW: CPT

## 2023-12-13 PROCEDURE — 85730 THROMBOPLASTIN TIME PARTIAL: CPT

## 2023-12-13 PROCEDURE — 86901 BLOOD TYPING SEROLOGIC RH(D): CPT

## 2023-12-13 PROCEDURE — 86985 SPLIT BLOOD OR PRODUCTS: CPT

## 2023-12-13 PROCEDURE — 82803 BLOOD GASES ANY COMBINATION: CPT

## 2023-12-13 PROCEDURE — 72170 X-RAY EXAM OF PELVIS: CPT

## 2023-12-13 PROCEDURE — 83036 HEMOGLOBIN GLYCOSYLATED A1C: CPT

## 2023-12-13 PROCEDURE — 36430 TRANSFUSION BLD/BLD COMPNT: CPT

## 2023-12-13 PROCEDURE — 85014 HEMATOCRIT: CPT

## 2023-12-13 PROCEDURE — 97162 PT EVAL MOD COMPLEX 30 MIN: CPT

## 2023-12-13 PROCEDURE — P9011: CPT

## 2023-12-13 PROCEDURE — 82962 GLUCOSE BLOOD TEST: CPT

## 2023-12-13 PROCEDURE — 86850 RBC ANTIBODY SCREEN: CPT

## 2023-12-13 PROCEDURE — 83605 ASSAY OF LACTIC ACID: CPT

## 2023-12-13 PROCEDURE — 82435 ASSAY OF BLOOD CHLORIDE: CPT

## 2024-01-01 NOTE — ASU PREOP CHECKLIST - BP NONINVASIVE SYSTOLIC (MM HG)
Physical Therapy    Visit Type: initial evaluation  Born at Gestational Age: 34w0d and now corrected age 34w 3d    Present at bedside: Nurse  Precautions: per protocol and per guidelines    SUBJECTIVE  Patient was born at 34 0/7 weeks Gestational Age via C-sec 2* mono-di twin gestation. Fetal growth restriction of infants.  Patient / Family Goals: meet developmental milestones    OBJECTIVE    Autonomic Stability:    Heart Rate: stable    Blood Pressure: stable    Respiratory: stable    Oxygen Saturations: stable    Color: stable color    Temperature: stable    Visceral/GI: stable    Bed Type: isolette  Respiratory Support: high flow nasal cannula  Respiratory Comments: tolerating well  Current Positioning Device: Nest, small and Frog    Neurobehavioral:    Infant stress cues:      -physiological: yawning      -motor: finger splay, sitting on air/leg extension and stop sign/salute      -behavioral: grimace    Self regulation:       -achieved with assistance of: containment and boundaries    Postural Assessment:   Head posture:     - in supine: head rotated right and head rotated left  Resting trunk posture: trunk midline and symmetrical  UE position:    - Left: shoulder elevated, arm extended and arm at side    - Right: shoulder elevated, arm extended and arm at side  LE position:     - Left: hip extended and hip external rotation    - Right: hip extended and hip external rotation    Muscle Tone: typical tone  Comments / Details: Patient muscle tone is Within normal limits for her Gestational Age but infant with limited movements of her extremities this session.   Range of Motion (noted in % unless otherwise indicated, active reported unless noted):  Comments / Details: Patient displays Within normal limits Range of motion for her Gestational Age.         Movement:  Overall analysis of movement during session: uncoordinated and jerky        ASSESSMENT    - Impairments: strength, physiologic stability, movement  quality, head shape, tolerance to positioning, state regulation, postural control, motor control, head control, activity tolerance and tolerance to handling  - Functional Limitations: transitional movement  Patient is a  infant who displays Within normal limits Range of motion And muscle tone for her Gestational Age. She displays limited movements of her extremities against gravity this session maintaining an extended posture. She had her eyes covered with bili shield and unable to assess state regulation. She displayed jerky movements of her Lower and Upper extremities with moving against gravity. She calmed well with containment and flexion into midline.      Discharge Recommendations        PT Referrals/Discharge Recommendations: Other (comments) (TBA)      Recommend ongoing PT while inpatient to promote age appropriate neuromotor and sensorimotor development and to provide ongoing parent education.  Skilled therapy is required to address these limitations in attempt to maximize the patient's independence.  - Predicted patient presentation: Moderate (evolving) - Patient comorbidities and complexities, as defined above, may have varying impact on steady progress for prescribed plan of care.    End of Session:  Location: isolette  Safety measures: alarm system in place/re-engaged  Handoff to: nurse  PLAN    Suggestions for next session as indicated:   Frequency of Treatment: 3-4x/week, seen 24  Interventions: state regulation, facilitation of symmetrical movement patterns, strengthening, ROM, positioning, motor training, handling, facilitation of transitions and facilitation of head control      GOALS    Long Term Goals: (to be met by time of discharge from hospital)  Clear Airway: Patient will turn head in prone to clear airway  Head midline: Patient able to hold head in midline in supine and supported sitting for 2-3 sec  Head shape: Patient will demonstrate symmetrical rounded head shape  development  Physiological flexion: Patient will assume and maintain physiological flexion for 3 seconds  Tolerate: Patient will tolerate position changes, handling and supported sitting with stable vitals  for 2 min    Documented in the chart in the following areas: Assessment/Plan.      Therapy procedure time and total treatment time can be found documented on the Time Entry flowsheet   145

## 2024-01-12 ENCOUNTER — APPOINTMENT (OUTPATIENT)
Dept: ORTHOPEDIC SURGERY | Facility: CLINIC | Age: 89
End: 2024-01-12
Payer: MEDICARE

## 2024-01-12 VITALS — BODY MASS INDEX: 24.86 KG/M2 | WEIGHT: 164 LBS | HEIGHT: 68 IN

## 2024-01-12 DIAGNOSIS — M43.16 SPONDYLOLISTHESIS, LUMBAR REGION: ICD-10-CM

## 2024-01-12 PROCEDURE — 72100 X-RAY EXAM L-S SPINE 2/3 VWS: CPT

## 2024-01-12 PROCEDURE — 99214 OFFICE O/P EST MOD 30 MIN: CPT

## 2024-01-12 NOTE — DISCUSSION/SUMMARY
[de-identified] : Overall, he is recovered very well with regards to his lumbar surgery.  He is recovering from recent surgery for hip fracture.  He continues to do therapy for this.  He will follow-up with me in 6 months to 1 years time or sooner with any changes or worsening of his symptoms.

## 2024-01-12 NOTE — HISTORY OF PRESENT ILLNESS
[de-identified] : Mr. ELIJAH NOLAN  is a 88 year old male who presents s/p L3-5 laminectomy and L4-5 fusion on 12/13/22.  Leg pain is gone.  He feels some pain related to generalized body arthritis.  He is recovering from recent hip fracture

## 2024-01-12 NOTE — PHYSICAL EXAM
[Antalgic] : not antalgic [Stooped] : not stooped [Walker] : ambulates with walker [de-identified] : Incision is healed.  Stable neurologic exam. [de-identified] : Review of his lumbar spine MRI reveals moderate to severe stenosis from L3-5 with L4-5 spondylolisthesis  AP lateral lumbar x-rays reveals instrumented L4-5 fusion.  Improved posterior lateral arthrodesis.

## 2024-04-18 NOTE — ASU PATIENT PROFILE, ADULT - PAIN SCALE PREFERRED, PROFILE
PAST MEDICAL HISTORY:  Anxiety     Asthma     Depression     Fibroids     HPV in female     Migraines     KYLER (obstructive sleep apnea)      numerical 0-10

## 2024-05-30 ENCOUNTER — OUTPATIENT (OUTPATIENT)
Dept: OUTPATIENT SERVICES | Facility: HOSPITAL | Age: 89
LOS: 1 days | End: 2024-05-30
Payer: MEDICARE

## 2024-05-30 ENCOUNTER — TRANSCRIPTION ENCOUNTER (OUTPATIENT)
Age: 89
End: 2024-05-30

## 2024-05-30 VITALS
TEMPERATURE: 98 F | HEIGHT: 68 IN | DIASTOLIC BLOOD PRESSURE: 65 MMHG | WEIGHT: 162.04 LBS | OXYGEN SATURATION: 100 % | SYSTOLIC BLOOD PRESSURE: 129 MMHG | RESPIRATION RATE: 20 BRPM | HEART RATE: 59 BPM

## 2024-05-30 DIAGNOSIS — I25.10 ATHEROSCLEROTIC HEART DISEASE OF NATIVE CORONARY ARTERY WITHOUT ANGINA PECTORIS: ICD-10-CM

## 2024-05-30 DIAGNOSIS — Z29.9 ENCOUNTER FOR PROPHYLACTIC MEASURES, UNSPECIFIED: ICD-10-CM

## 2024-05-30 DIAGNOSIS — S52.031A DISPLACED FRACTURE OF OLECRANON PROCESS WITH INTRAARTICULAR EXTENSION OF RIGHT ULNA, INITIAL ENCOUNTER FOR CLOSED FRACTURE: ICD-10-CM

## 2024-05-30 DIAGNOSIS — Z98.1 ARTHRODESIS STATUS: Chronic | ICD-10-CM

## 2024-05-30 DIAGNOSIS — Z95.5 PRESENCE OF CORONARY ANGIOPLASTY IMPLANT AND GRAFT: Chronic | ICD-10-CM

## 2024-05-30 DIAGNOSIS — Z01.818 ENCOUNTER FOR OTHER PREPROCEDURAL EXAMINATION: ICD-10-CM

## 2024-05-30 DIAGNOSIS — Z98.890 OTHER SPECIFIED POSTPROCEDURAL STATES: Chronic | ICD-10-CM

## 2024-05-30 DIAGNOSIS — Z95.1 PRESENCE OF AORTOCORONARY BYPASS GRAFT: Chronic | ICD-10-CM

## 2024-05-30 DIAGNOSIS — Z90.89 ACQUIRED ABSENCE OF OTHER ORGANS: Chronic | ICD-10-CM

## 2024-05-30 LAB
ANION GAP SERPL CALC-SCNC: 11 MMOL/L — SIGNIFICANT CHANGE UP (ref 5–17)
BUN SERPL-MCNC: 25 MG/DL — HIGH (ref 7–23)
CALCIUM SERPL-MCNC: 9.5 MG/DL — SIGNIFICANT CHANGE UP (ref 8.4–10.5)
CHLORIDE SERPL-SCNC: 105 MMOL/L — SIGNIFICANT CHANGE UP (ref 96–108)
CO2 SERPL-SCNC: 24 MMOL/L — SIGNIFICANT CHANGE UP (ref 22–31)
CREAT SERPL-MCNC: 0.81 MG/DL — SIGNIFICANT CHANGE UP (ref 0.5–1.3)
EGFR: 85 ML/MIN/1.73M2 — SIGNIFICANT CHANGE UP
GLUCOSE SERPL-MCNC: 92 MG/DL — SIGNIFICANT CHANGE UP (ref 70–99)
HCT VFR BLD CALC: 33 % — LOW (ref 39–50)
HGB BLD-MCNC: 10.8 G/DL — LOW (ref 13–17)
MCHC RBC-ENTMCNC: 30.3 PG — SIGNIFICANT CHANGE UP (ref 27–34)
MCHC RBC-ENTMCNC: 32.7 GM/DL — SIGNIFICANT CHANGE UP (ref 32–36)
MCV RBC AUTO: 92.7 FL — SIGNIFICANT CHANGE UP (ref 80–100)
NRBC # BLD: 0 /100 WBCS — SIGNIFICANT CHANGE UP (ref 0–0)
PLATELET # BLD AUTO: 123 K/UL — LOW (ref 150–400)
POTASSIUM SERPL-MCNC: 4.4 MMOL/L — SIGNIFICANT CHANGE UP (ref 3.5–5.3)
POTASSIUM SERPL-SCNC: 4.4 MMOL/L — SIGNIFICANT CHANGE UP (ref 3.5–5.3)
RBC # BLD: 3.56 M/UL — LOW (ref 4.2–5.8)
RBC # FLD: 14.1 % — SIGNIFICANT CHANGE UP (ref 10.3–14.5)
SODIUM SERPL-SCNC: 140 MMOL/L — SIGNIFICANT CHANGE UP (ref 135–145)
WBC # BLD: 4.05 K/UL — SIGNIFICANT CHANGE UP (ref 3.8–10.5)
WBC # FLD AUTO: 4.05 K/UL — SIGNIFICANT CHANGE UP (ref 3.8–10.5)

## 2024-05-30 RX ORDER — SODIUM CHLORIDE 9 MG/ML
3 INJECTION INTRAMUSCULAR; INTRAVENOUS; SUBCUTANEOUS EVERY 8 HOURS
Refills: 0 | Status: DISCONTINUED | OUTPATIENT
Start: 2024-05-31 | End: 2024-05-31

## 2024-05-30 RX ORDER — CEFAZOLIN SODIUM 1 G
2000 VIAL (EA) INJECTION ONCE
Refills: 0 | Status: COMPLETED | OUTPATIENT
Start: 2024-05-31 | End: 2024-05-31

## 2024-05-30 RX ORDER — CHLORHEXIDINE GLUCONATE 213 G/1000ML
1 SOLUTION TOPICAL ONCE
Refills: 0 | Status: DISCONTINUED | OUTPATIENT
Start: 2024-05-31 | End: 2024-05-31

## 2024-05-30 RX ORDER — LIDOCAINE HCL 20 MG/ML
0.2 VIAL (ML) INJECTION ONCE
Refills: 0 | Status: DISCONTINUED | OUTPATIENT
Start: 2024-05-31 | End: 2024-05-31

## 2024-05-30 NOTE — H&P PST ADULT - PROBLEM SELECTOR PLAN 2
ORIF comminuted right IA olecranon fracture  retrieve Cardiac evaluation done 5/30/24  hold diclofenac preop

## 2024-05-30 NOTE — H&P PST ADULT - NSICDXPASTSURGICALHX_GEN_ALL_CORE_FT
PAST SURGICAL HISTORY:  S/P CABG x 5     S/P hip arthroscopy     S/P lumbar spinal fusion     S/P primary angioplasty with coronary stent 06/2021    S/P tonsillectomy

## 2024-05-30 NOTE — H&P PST ADULT - ASSESSMENT
DASI score: 7  DASI activity:no formal exercise, gardens  Loose teeth or denture:  omid  CAPRINI VTE 2.0 SCORE [CLOT updated 2019]    AGE RELATED RISK FACTORS                                                       MOBILITY RELATED FACTORS  [ ] Age 41-60 years                                            (1 Point)                    [ ] Bed rest                                                        (1 Point)  [ ] Age: 61-74 years                                           (2 Points)                  [ ] Plaster cast                                                   (2 Points)  [x ] Age= 75 years                                              (3 Points)                    [ ] Bed bound for more than 72 hours                 (2 Points)    DISEASE RELATED RISK FACTORS                                               GENDER SPECIFIC FACTORS  [ ] Edema in the lower extremities                       (1 Point)              [ ] Pregnancy                                                     (1 Point)  [ ] Varicose veins                                               (1 Point)                     [ ] Post-partum < 6 weeks                                   (1 Point)             [ ] BMI > 25 Kg/m2                                            (1 Point)                     [ ] Hormonal therapy  or oral contraception          (1 Point)                 [ ] Sepsis (in the previous month)                        (1 Point)               [ ] History of pregnancy complications                 (1 point)  [ ] Pneumonia or serious lung disease                                               [ ] Unexplained or recurrent                     (1 Point)           (in the previous month)                               (1 Point)  [ ] Abnormal pulmonary function test                     (1 Point)                 SURGERY RELATED RISK FACTORS  [ ] Acute myocardial infarction                              (1 Point)               [ ]  Section                                             (1 Point)  [ ] Congestive heart failure (in the previous month)  (1 Point)      [ ] Minor surgery                                                  (1 Point)   [ ] Inflammatory bowel disease                             (1 Point)               [ ] Arthroscopic surgery                                        (2 Points)  [ ] Central venous access                                      (2 Points)                [x ] General surgery lasting more than 45 minutes (2 points)  [x ] Malignancy- Present or previous                   (2 Points)                [ ] Elective arthroplasty                                         (5 points)    [ ] Stroke (in the previous month)                          (5 Points)                                                                                                                                                           HEMATOLOGY RELATED FACTORS                                                 TRAUMA RELATED RISK FACTORS  [ ] Prior episodes of VTE                                     (3 Points)                [ ] Fracture of the hip, pelvis, or leg                       (5 Points)  [ ] Positive family history for VTE                         (3 Points)             [ ] Acute spinal cord injury (in the previous month)  (5 Points)  [ ] Prothrombin 03851 A                                     (3 Points)               [ ] Paralysis  (less than 1 month)                             (5 Points)  [ ] Factor V Leiden                                             (3 Points)                  [ ] Multiple Trauma within 1 month                        (5 Points)  [ ] Lupus anticoagulants                                     (3 Points)                                                           [ ] Anticardiolipin antibodies                               (3 Points)                                                       [ ] High homocysteine in the blood                      (3 Points)                                             [ ] Other congenital or acquired thrombophilia      (3 Points)                                                [ ] Heparin induced thrombocytopenia                  (3 Points)                                     Total Score [        7  ]

## 2024-05-30 NOTE — H&P PST ADULT - HISTORY OF PRESENT ILLNESS
87 yo male with CAD, s/p CABGx5 and several cardiac stents last in 2021, OA, hyothyroid, Prostate cancer, HTN, HLD with recent fall in backyard.  Xray shows fractured right elbow, now for surgical intervention.   87 yo male with CAD, s/p CABGx5 and several cardiac stents last in 2021, thrombocytopenia for few years,  OA, hyothyroid, Prostate cancer, HTN, HLD with recent fall in backyard.  Xray shows fractured right elbow, now for surgical intervention.

## 2024-05-30 NOTE — H&P PST ADULT - WEIGHT IN LBS
HPI:  Patient is a 19 y/o M w/ a PMHx of Stage IV CHL (Dx 2018, s/p multiple lines of therapy which has been c/b nonadherence and patient refusal of treatment) who was transferred to [facility] form [facility] due to concern for cauda equina syndrome. Patient initially presented to [facility] for worsening hip and back pain for 3 weeks. His back pain involves the entire back, but is worse in the lumbar area. His hip pain is worsened with bearing weight. Due to concerns of possible cord compression / cauda equina, patient was transferred to [facility] for further evaluation. At [facility], an MRI C/T/L-Spine was obtained which showed extensive left cervical chain lymphadenopathy which appears to have increased since 2020, multiple marrow replacing lesions throughout the thoracic spine which includes epidural tumor involvement at T2, a possible mild pathologic compression fracture at T4, and a severe pathologic compression fracture with ventral epidural tumor involvement which contributes to mild central canal stenosis at T9. There is also associated cord signal abnormality extending from T8-T11. In addition, this imaging study showed multiple marrow replacing lesions throughout the lumbar spine and sacrum which includes moderate pathologic compression fracture and ventral epidural tumor involvement which contributes to moderate central canal stenosis at L1 as well as a severe pathologic compression fracture with associated levocurvature of the lumbar spine at L4. Patient was ultimately admitted to Medicine for further management. Patient was started on high-dose Decadron due to concern for cord compression. Orthopedics are currently following. Given patient's history of Hodgkin's lymphoma, Hematology was consulted for further evaluation.        Hematologic History:  Patient was diagnosed with stage IV CHL in 2018. He was initially treated following ABVE-PC which was completed in 2018 after 5 cycles due to nonadherence and patient refusal. He then relapsed in 2019. He was started on salvage treatment in 2019 with gemzar/brentuximab which was also stopped due to the patient's refusal and nonadherence. Patient was then recommended to have 2 cycles of ICE with intent to pursue an autologous bone marrow transplant. The patient began the cycle and then left AMA unfortunately and so this was never completed. Patient follows with Dr. Kalie Emery as an outpatient. He was last seen in clinic on 20. An extensive discussion regarding the need for treatment and the need for him to adhere to therapy took place; however, patient continued to not want to pursue further chemotherapy despite the discussion of death.       PAST MEDICAL & SURGICAL HISTORY:  Hodgkins lymphoma in relapse    No significant past surgical history      Review of Systems:   CONSTITUTIONAL: No fever, weight loss, or fatigue  EYES: No eye pain, visual disturbances, or discharge  ENMT:  No difficulty hearing, tinnitus, vertigo; No sinus or throat pain  NECK: No pain or stiffness  BREASTS: No pain, masses, or nipple discharge  RESPIRATORY: No cough, wheezing, chills or hemoptysis; No shortness of breath  CARDIOVASCULAR: No chest pain, palpitations, dizziness, or leg swelling  GASTROINTESTINAL: No abdominal or epigastric pain. No nausea, vomiting, or hematemesis; No diarrhea or constipation. No melena or hematochezia.  GENITOURINARY: No dysuria, frequency, hematuria, or incontinence  NEUROLOGICAL: No headaches, memory loss, loss of strength, numbness, or tremors  SKIN: No itching, burning, rashes, or lesions   LYMPH NODES: No enlarged glands  ENDOCRINE: No heat or cold intolerance; No hair loss  MUSCULOSKELETAL: No joint pain or swelling; No muscle, back, or extremity pain  PSYCHIATRIC: No depression, anxiety, mood swings, or difficulty sleeping  HEME/LYMPH: No easy bruising, or bleeding gums  ALLERY AND IMMUNOLOGIC: No hives or eczema    Allergies    IV Contrast (Vomiting)  Rocephin (Pruritus)    Intolerances    Social History: History of marijuana use, No current smoking or EtOH use    FAMILY HISTORY:  No pertinent family history in first degree relatives    MEDICATIONS  (STANDING):  dexAMETHasone  Injectable 4 milliGRAM(s) IV Push every 6 hours  enoxaparin Injectable 40 milliGRAM(s) SubCutaneous daily  hydrocortisone 1% Cream 1 Application(s) Topical daily  influenza   Vaccine 0.5 milliLiter(s) IntraMuscular once  levoFLOXacin IVPB 500 milliGRAM(s) IV Intermittent every 24 hours  levoFLOXacin IVPB      pantoprazole    Tablet 40 milliGRAM(s) Oral before breakfast  petrolatum white Ointment 1 Application(s) Topical two times a day    MEDICATIONS  (PRN):  acetaminophen   Tablet .. 650 milliGRAM(s) Oral every 6 hours PRN Temp greater or equal to 38C (100.4F), Mild Pain (1 - 3), Moderate Pain (4 - 6)  diphenhydrAMINE 25 milliGRAM(s) Oral every 6 hours PRN Rash and/or Itching  ondansetron Injectable 4 milliGRAM(s) IV Push once PRN Nausea        CAPILLARY BLOOD GLUCOSE        I&O's Summary    17 Oct 2020 07:01  -  18 Oct 2020 07:00  --------------------------------------------------------  IN: 600 mL / OUT: 2200 mL / NET: -1600 mL    18 Oct 2020 07:01  -  18 Oct 2020 10:43  --------------------------------------------------------  IN: 100 mL / OUT: 0 mL / NET: 100 mL    Vital Signs Last 24 Hrs  T(C): 36.9 (18 Oct 2020 04:06), Max: 37.2 (17 Oct 2020 20:49)  T(F): 98.4 (18 Oct 2020 04:06), Max: 98.9 (17 Oct 2020 20:49)  HR: 79 (18 Oct 2020 04:06) (68 - 92)  BP: 100/66 (18 Oct 2020 04:06) (96/58 - 106/70)  BP(mean): --  RR: 18 (18 Oct 2020 04:06) (18 - 20)  SpO2: 98% (18 Oct 2020 04:06) (98% - 99%)    PHYSICAL EXAM:  GENERAL: NAD, well-developed  HEAD:  Atraumatic, Normocephalic  EYES: EOMI, PERRLA, conjunctiva and sclera clear  NECK: Supple, No JVD  CHEST/LUNG: Clear to auscultation bilaterally; No wheeze  HEART: Regular rate and rhythm; No murmurs, rubs, or gallops  ABDOMEN: Soft, Nontender, Nondistended; Bowel sounds present  EXTREMITIES:  2+ Peripheral Pulses, No clubbing, cyanosis, or edema  PSYCH: AAOx3  NEUROLOGY: non-focal  SKIN: No rashes or lesions    LABS:                        8.5    40.06 )-----------( 652      ( 18 Oct 2020 07:04 )             28.9     10-18    137  |  98  |  13  ----------------------------<  78  3.4<L>   |  25  |  0.58    Ca    9.5      18 Oct 2020 07:04  Phos  4.8     10-18  Mg     2.1     10-18    TPro  7.1  /  Alb  2.7<L>  /  TBili  0.3  /  DBili  x   /  AST  31  /  ALT  52<H>  /  AlkPhos  288<H>  10-18    PT/INR - ( 17 Oct 2020 06:51 )   PT: 18.7 sec;   INR: 1.59 ratio         PTT - ( 17 Oct 2020 06:51 )  PTT:40.0 sec      Urinalysis Basic - ( 17 Oct 2020 13:02 )    Color: Light Yellow / Appearance: Clear / S.022 / pH: x  Gluc: x / Ketone: Negative  / Bili: Negative / Urobili: <2 mg/dL   Blood: x / Protein: Trace / Nitrite: Negative   Leuk Esterase: Negative / RBC: 1 /HPF / WBC 1 /HPF   Sq Epi: x / Non Sq Epi: 0 /HPF / Bacteria: Negative    RADIOLOGY & ADDITIONAL TESTS:  Studies reviewed. HPI:  Patient is a 19 y/o M w/ a PMHx of Stage IV CHL (Dx 2018, s/p multiple lines of therapy which has been c/b nonadherence and patient refusal of treatment) who was transferred to [facility] form [facility] due to concern for cauda equina syndrome. Patient initially presented to [facility] for worsening hip and back pain for 3 weeks. His back pain involves the entire back, but is worse in the lumbar area. His hip pain is worsened with bearing weight. At [facility], a CT A+P was obtained which showed diffuse lung and bony metastases, severe pathologic compression fracture deformity of L1 with large ventral epidural soft tissue component results in severe central canal stenosis concerning for cord compression and/or cauda equina syndrome, moderate pathologic fracture deformity of L4 with ventral epidural extension results in moderate central canal narrowing, and multiple bibasilar lung metastases (R>L). Due to concerns of possible cord compression / cauda equina, patient was transferred to [facility] for further evaluation. At [facility], an MRI C/T/L-Spine was obtained which showed extensive left cervical chain lymphadenopathy which appears to have increased since 2020, multiple marrow replacing lesions throughout the thoracic spine which includes epidural tumor involvement at T2, a possible mild pathologic compression fracture at T4, and a severe pathologic compression fracture with ventral epidural tumor involvement which contributes to mild central canal stenosis at T9. There is also associated cord signal abnormality extending from T8-T11. In addition, this imaging study showed multiple marrow replacing lesions throughout the lumbar spine and sacrum which includes moderate pathologic compression fracture and ventral epidural tumor involvement which contributes to moderate central canal stenosis at L1 as well as a severe pathologic compression fracture with associated levocurvature of the lumbar spine at L4. Patient was ultimately admitted to Medicine for further management. Patient was started on high-dose Decadron due to concern for cord compression. Orthopedics are currently following. Given patient's history of Hodgkin's lymphoma, Hematology was consulted for further evaluation.      Hematologic History:  Patient was diagnosed with stage IV CHL in 2018. He was initially treated following ABVE-PC which was completed in 2018 after 5 cycles due to nonadherence and patient refusal. He then relapsed in 2019. He was started on salvage treatment in 2019 with gemzar/brentuximab which was also stopped due to the patient's refusal and nonadherence. Patient was then recommended to have 2 cycles of ICE with intent to pursue an autologous bone marrow transplant. The patient began the cycle and then left AMA unfortunately and so this was never completed. Patient follows with Dr. Kalie Emery as an outpatient. He was last seen in clinic on 20. An extensive discussion regarding the need for treatment and the need for him to adhere to therapy took place; however, patient continued to not want to pursue further chemotherapy despite the discussion of death.       PAST MEDICAL & SURGICAL HISTORY:  Hodgkins lymphoma in relapse    No significant past surgical history      Review of Systems:   CONSTITUTIONAL: No fever, weight loss, or fatigue  EYES: No eye pain, visual disturbances, or discharge  ENMT:  No difficulty hearing, tinnitus, vertigo; No sinus or throat pain  NECK: No pain or stiffness  BREASTS: No pain, masses, or nipple discharge  RESPIRATORY: No cough, wheezing, chills or hemoptysis; No shortness of breath  CARDIOVASCULAR: No chest pain, palpitations, dizziness, or leg swelling  GASTROINTESTINAL: No abdominal or epigastric pain. No nausea, vomiting, or hematemesis; No diarrhea or constipation. No melena or hematochezia.  GENITOURINARY: No dysuria, frequency, hematuria, or incontinence  NEUROLOGICAL: No headaches, memory loss, loss of strength, numbness, or tremors  SKIN: No itching, burning, rashes, or lesions   LYMPH NODES: No enlarged glands  ENDOCRINE: No heat or cold intolerance; No hair loss  MUSCULOSKELETAL: No joint pain or swelling; No muscle, back, or extremity pain  PSYCHIATRIC: No depression, anxiety, mood swings, or difficulty sleeping  HEME/LYMPH: No easy bruising, or bleeding gums  ALLERY AND IMMUNOLOGIC: No hives or eczema    Allergies    IV Contrast (Vomiting)  Rocephin (Pruritus)    Intolerances    Social History: History of marijuana use, No current smoking or EtOH use    FAMILY HISTORY:  No pertinent family history in first degree relatives    MEDICATIONS  (STANDING):  dexAMETHasone  Injectable 4 milliGRAM(s) IV Push every 6 hours  enoxaparin Injectable 40 milliGRAM(s) SubCutaneous daily  hydrocortisone 1% Cream 1 Application(s) Topical daily  influenza   Vaccine 0.5 milliLiter(s) IntraMuscular once  levoFLOXacin IVPB 500 milliGRAM(s) IV Intermittent every 24 hours  levoFLOXacin IVPB      pantoprazole    Tablet 40 milliGRAM(s) Oral before breakfast  petrolatum white Ointment 1 Application(s) Topical two times a day    MEDICATIONS  (PRN):  acetaminophen   Tablet .. 650 milliGRAM(s) Oral every 6 hours PRN Temp greater or equal to 38C (100.4F), Mild Pain (1 - 3), Moderate Pain (4 - 6)  diphenhydrAMINE 25 milliGRAM(s) Oral every 6 hours PRN Rash and/or Itching  ondansetron Injectable 4 milliGRAM(s) IV Push once PRN Nausea        CAPILLARY BLOOD GLUCOSE        I&O's Summary    17 Oct 2020 07:01  -  18 Oct 2020 07:00  --------------------------------------------------------  IN: 600 mL / OUT: 2200 mL / NET: -1600 mL    18 Oct 2020 07:01  -  18 Oct 2020 10:43  --------------------------------------------------------  IN: 100 mL / OUT: 0 mL / NET: 100 mL    Vital Signs Last 24 Hrs  T(C): 36.9 (18 Oct 2020 04:06), Max: 37.2 (17 Oct 2020 20:49)  T(F): 98.4 (18 Oct 2020 04:06), Max: 98.9 (17 Oct 2020 20:49)  HR: 79 (18 Oct 2020 04:06) (68 - 92)  BP: 100/66 (18 Oct 2020 04:06) (96/58 - 106/70)  BP(mean): --  RR: 18 (18 Oct 2020 04:06) (18 - 20)  SpO2: 98% (18 Oct 2020 04:06) (98% - 99%)    PHYSICAL EXAM:  GENERAL: NAD, well-developed  HEAD:  Atraumatic, Normocephalic  EYES: EOMI, PERRLA, conjunctiva and sclera clear  NECK: Supple, No JVD  CHEST/LUNG: Clear to auscultation bilaterally; No wheeze  HEART: Regular rate and rhythm; No murmurs, rubs, or gallops  ABDOMEN: Soft, Nontender, Nondistended; Bowel sounds present  EXTREMITIES:  2+ Peripheral Pulses, No clubbing, cyanosis, or edema  PSYCH: AAOx3  NEUROLOGY: non-focal  SKIN: No rashes or lesions    LABS:                        8.5    40.06 )-----------( 652      ( 18 Oct 2020 07:04 )             28.9     10-18    137  |  98  |  13  ----------------------------<  78  3.4<L>   |  25  |  0.58    Ca    9.5      18 Oct 2020 07:04  Phos  4.8     10-18  Mg     2.1     10-18    TPro  7.1  /  Alb  2.7<L>  /  TBili  0.3  /  DBili  x   /  AST  31  /  ALT  52<H>  /  AlkPhos  288<H>  10-18    PT/INR - ( 17 Oct 2020 06:51 )   PT: 18.7 sec;   INR: 1.59 ratio         PTT - ( 17 Oct 2020 06:51 )  PTT:40.0 sec      Urinalysis Basic - ( 17 Oct 2020 13:02 )    Color: Light Yellow / Appearance: Clear / S.022 / pH: x  Gluc: x / Ketone: Negative  / Bili: Negative / Urobili: <2 mg/dL   Blood: x / Protein: Trace / Nitrite: Negative   Leuk Esterase: Negative / RBC: 1 /HPF / WBC 1 /HPF   Sq Epi: x / Non Sq Epi: 0 /HPF / Bacteria: Negative    RADIOLOGY & ADDITIONAL TESTS:  Studies reviewed. HPI:  Patient is a 21 y/o M w/ a PMHx of Stage IV CHL (Dx 2018, s/p multiple lines of therapy which has been c/b nonadherence and patient refusal of treatment) who was transferred to [facility] form [facility] due to concern for cauda equina syndrome. Patient initially presented to [facility] for worsening hip and back pain for 3 weeks. His back pain involves the entire back, but is worse in the lumbar area. His hip pain is worsened with bearing weight. 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 Due to concerns of possible cord compression / cauda equina, patient was transferred to [facility] for further evaluation. At [facility], an MRI C/T/L-Spine was obtained which showed extensive left cervical chain lymphadenopathy which appears to have increased since 2020, multiple marrow replacing lesions throughout the thoracic spine which includes epidural tumor involvement at T2, a possible mild pathologic compression fracture at T4, and a severe pathologic compression fracture with ventral epidural tumor involvement which contributes to mild central canal stenosis at T9. There is also associated cord signal abnormality extending from T8-T11. In addition, this imaging study showed multiple marrow replacing lesions throughout the lumbar spine and sacrum which includes moderate pathologic compression fracture and ventral epidural tumor involvement which contributes to moderate central canal stenosis at L1 as well as a severe pathologic compression fracture with associated levocurvature of the lumbar spine at L4. Patient was ultimately admitted to Medicine for further management. Patient was started on high-dose Decadron due to concern for cord compression. Orthopedics are currently following. Given patient's history of Hodgkin's lymphoma, Hematology was consulted for further evaluation.    Patient seen this AM. He reports B/L hip pain and lower back pain. He reports that he is able to move his extremities without difficulty; however, he has a very difficult time going from a sitting to standing position and moving quickly. He endorses ~ 40lbs weight loss over the past few months with associated intermittent fevers and night sweats. No complaints of chest pain or shortness of breath. He does endorse occasional, mild R-sided abdominal pain. No nausea or vomiting. Patient was afebrile overnight.    Hematologic History:  Patient was diagnosed with stage IV CHL in 2018. He was initially treated following ABVE-PC which was completed in 2018 after 5 cycles due to nonadherence and patient refusal. He then relapsed in 2019. He was started on salvage treatment in 2019 with gemzar/brentuximab which was also stopped due to the patient's refusal and nonadherence. Patient was then recommended to have 2 cycles of ICE with intent to pursue an autologous bone marrow transplant. The patient began the cycle and then left AMA unfortunately and so this was never completed. Patient follows with Dr. Kalie Emery as an outpatient. He was last seen in clinic on 20. An extensive discussion regarding the need for treatment and the need for him to adhere to therapy took place; however, patient continued to not want to pursue further chemotherapy despite the discussion of death.       PAST MEDICAL & SURGICAL HISTORY:  Hodgkins lymphoma in relapse    No significant past surgical history      Review of Systems:   CONSTITUTIONAL: + Fever, + Weight loss, + Night sweats  EYES: No eye pain or discharge  ENMT: No sinus or throat pain  NECK: No pain or stiffness  RESPIRATORY: No shortness of breath or cough  CARDIOVASCULAR: No chest pain or palpitations  GASTROINTESTINAL: + Occasional, mild R-sided abdominal pain, No vomiting  GENITOURINARY: No dysuria or hematuria  NEUROLOGICAL: No headache or syncope  SKIN: No itching or rash  MUSCULOSKELETAL: + Back pain, + Hip pain    Allergies    IV Contrast (Vomiting)  Rocephin (Pruritus)    Intolerances    Social History: History of marijuana use, No current smoking or EtOH use    FAMILY HISTORY:  No pertinent family history in first degree relatives    MEDICATIONS  (STANDING):  dexAMETHasone  Injectable 4 milliGRAM(s) IV Push every 6 hours  enoxaparin Injectable 40 milliGRAM(s) SubCutaneous daily  hydrocortisone 1% Cream 1 Application(s) Topical daily  influenza   Vaccine 0.5 milliLiter(s) IntraMuscular once  levoFLOXacin IVPB 500 milliGRAM(s) IV Intermittent every 24 hours  levoFLOXacin IVPB      pantoprazole    Tablet 40 milliGRAM(s) Oral before breakfast  petrolatum white Ointment 1 Application(s) Topical two times a day    MEDICATIONS  (PRN):  acetaminophen   Tablet .. 650 milliGRAM(s) Oral every 6 hours PRN Temp greater or equal to 38C (100.4F), Mild Pain (1 - 3), Moderate Pain (4 - 6)  diphenhydrAMINE 25 milliGRAM(s) Oral every 6 hours PRN Rash and/or Itching  ondansetron Injectable 4 milliGRAM(s) IV Push once PRN Nausea        CAPILLARY BLOOD GLUCOSE        I&O's Summary    17 Oct 2020 07:  -  18 Oct 2020 07:00  --------------------------------------------------------  IN: 600 mL / OUT: 2200 mL / NET: -1600 mL    18 Oct 2020 07:01  -  18 Oct 2020 10:43  --------------------------------------------------------  IN: 100 mL / OUT: 0 mL / NET: 100 mL    Vital Signs Last 24 Hrs  T(C): 36.9 (18 Oct 2020 04:06), Max: 37.2 (17 Oct 2020 20:49)  T(F): 98.4 (18 Oct 2020 04:06), Max: 98.9 (17 Oct 2020 20:49)  HR: 79 (18 Oct 2020 04:06) (68 - 92)  BP: 100/66 (18 Oct 2020 04:06) (96/58 - 106/70)  BP(mean): --  RR: 18 (18 Oct 2020 04:06) (18 - 20)  SpO2: 98% (18 Oct 2020 04:06) (98% - 99%)    PHYSICAL EXAM:  GENERAL: NAD, Laying in bed, + Cachectic  HEENT: NC/AT, Slightly dry mucous membranes  NECK: Supple  CHEST/LUNG: Grossly CTAB; No wheeze appreciated  HEART: RRR; +S1/S2  ABDOMEN: +BS, Soft, Mild RLQ TTP, No rigidity  EXTREMITIES: No LE edema  NEUROLOGY: Awake and alert, Answering questions and following commands appropriately  SKIN: Warm and dry  PSYCH: Calm and cooperative    LABS:                        8.5    40.06 )-----------( 652      ( 18 Oct 2020 07:04 )             28.9     10-18    137  |  98  |  13  ----------------------------<  78  3.4<L>   |  25  |  0.58    Ca    9.5      18 Oct 2020 07:04  Phos  4.8     10-18  Mg     2.1     10-18    TPro  7.1  /  Alb  2.7<L>  /  TBili  0.3  /  DBili  x   /  AST  31  /  ALT  52<H>  /  AlkPhos  288<H>  1018    PT/INR - ( 17 Oct 2020 06:51 )   PT: 18.7 sec;   INR: 1.59 ratio         PTT - ( 17 Oct 2020 06:51 )  PTT:40.0 sec      Urinalysis Basic - ( 17 Oct 2020 13:02 )    Color: Light Yellow / Appearance: Clear / S.022 / pH: x  Gluc: x / Ketone: Negative  / Bili: Negative / Urobili: <2 mg/dL   Blood: x / Protein: Trace / Nitrite: Negative   Leuk Esterase: Negative / RBC: 1 /HPF / WBC 1 /HPF   Sq Epi: x / Non Sq Epi: 0 /HPF / Bacteria: Negative    RADIOLOGY & ADDITIONAL TESTS:  Studies reviewed. HPI:  Patient is a 19 y/o M w/ a PMHx of Stage IV CHL (Dx 2018, s/p multiple lines of therapy which has been c/b nonadherence and patient refusal of treatment) who was transferred to [facility] form [facility] due to concern for cauda equina syndrome. Patient initially presented to [facility] for worsening hip and back pain for 3 weeks. His back pain involves the entire back, but is worse in the lumbar area. His hip pain is worsened with bearing weight. At [facility], a CT A+P was obtained which showed diffuse lung and bony metastases, severe pathologic compression fracture deformity of L1 with large ventral epidural soft tissue component results in severe central canal stenosis concerning for cord compression and/or cauda equina syndrome, moderate pathologic fracture deformity of L4 with ventral epidural extension results in moderate central canal narrowing, and multiple bibasilar lung metastases (R>L). Due to concerns of possible cord compression / cauda equina, patient was transferred to [facility] for further evaluation. At [facility], an MRI C/T/L-Spine was obtained which showed extensive left cervical chain lymphadenopathy which appears to have increased since 2020, multiple marrow replacing lesions throughout the thoracic spine which includes epidural tumor involvement at T2, a possible mild pathologic compression fracture at T4, and a severe pathologic compression fracture with ventral epidural tumor involvement which contributes to mild central canal stenosis at T9. There is also associated cord signal abnormality extending from T8-T11. In addition, this imaging study showed multiple marrow replacing lesions throughout the lumbar spine and sacrum which includes moderate pathologic compression fracture and ventral epidural tumor involvement which contributes to moderate central canal stenosis at L1 as well as a severe pathologic compression fracture with associated levocurvature of the lumbar spine at L4. Patient was ultimately admitted to Medicine for further management. Patient was started on high-dose Decadron due to concern for cord compression. Orthopedics are currently following. Given patient's history of Hodgkin's lymphoma, Hematology was consulted for further evaluation.    Patient seen this AM. He reports B/L hip pain and lower back pain. He reports that he is able to move his extremities without difficulty; however, he has a very difficult time going from a sitting to standing position and moving quickly. He endorses ~ 40lbs weight loss over the past few months with associated intermittent fevers and night sweats. No complaints of chest pain or shortness of breath. He does endorse occasional, mild R-sided abdominal pain. No nausea or vomiting. Patient was afebrile overnight.    Hematologic History:  Patient was diagnosed with stage IV CHL in 2018. He was initially treated following ABVE-PC which was completed in 2018 after 5 cycles due to nonadherence and patient refusal. He then relapsed in 2019. He was started on salvage treatment in 2019 with gemzar/brentuximab which was also stopped due to the patient's refusal and nonadherence. Patient was then recommended to have 2 cycles of ICE with intent to pursue an autologous bone marrow transplant. The patient began the cycle and then left AMA unfortunately and so this was never completed. Patient follows with Dr. Kalie Emery as an outpatient. He was last seen in clinic on 20. An extensive discussion regarding the need for treatment and the need for him to adhere to therapy took place; however, patient continued to not want to pursue further chemotherapy despite the discussion of death.       PAST MEDICAL & SURGICAL HISTORY:  Hodgkins lymphoma in relapse    No significant past surgical history      Review of Systems:   CONSTITUTIONAL: + Fever, + Weight loss, + Night sweats  EYES: No eye pain or discharge  ENMT: No sinus or throat pain  NECK: No pain or stiffness  RESPIRATORY: No shortness of breath or cough  CARDIOVASCULAR: No chest pain or palpitations  GASTROINTESTINAL: + Occasional, mild R-sided abdominal pain, No vomiting  GENITOURINARY: No dysuria or hematuria  NEUROLOGICAL: No headache or syncope  SKIN: No itching or rash  MUSCULOSKELETAL: + Back pain, + Hip pain    Allergies    IV Contrast (Vomiting)  Rocephin (Pruritus)    Intolerances    Social History: History of marijuana use, No current smoking or EtOH use    FAMILY HISTORY:  No pertinent family history in first degree relatives    MEDICATIONS  (STANDING):  dexAMETHasone  Injectable 4 milliGRAM(s) IV Push every 6 hours  enoxaparin Injectable 40 milliGRAM(s) SubCutaneous daily  hydrocortisone 1% Cream 1 Application(s) Topical daily  influenza   Vaccine 0.5 milliLiter(s) IntraMuscular once  levoFLOXacin IVPB 500 milliGRAM(s) IV Intermittent every 24 hours  levoFLOXacin IVPB      pantoprazole    Tablet 40 milliGRAM(s) Oral before breakfast  petrolatum white Ointment 1 Application(s) Topical two times a day    MEDICATIONS  (PRN):  acetaminophen   Tablet .. 650 milliGRAM(s) Oral every 6 hours PRN Temp greater or equal to 38C (100.4F), Mild Pain (1 - 3), Moderate Pain (4 - 6)  diphenhydrAMINE 25 milliGRAM(s) Oral every 6 hours PRN Rash and/or Itching  ondansetron Injectable 4 milliGRAM(s) IV Push once PRN Nausea        CAPILLARY BLOOD GLUCOSE        I&O's Summary    17 Oct 2020 07:  -  18 Oct 2020 07:00  --------------------------------------------------------  IN: 600 mL / OUT: 2200 mL / NET: -1600 mL    18 Oct 2020 07:01  -  18 Oct 2020 10:43  --------------------------------------------------------  IN: 100 mL / OUT: 0 mL / NET: 100 mL    Vital Signs Last 24 Hrs  T(C): 36.9 (18 Oct 2020 04:06), Max: 37.2 (17 Oct 2020 20:49)  T(F): 98.4 (18 Oct 2020 04:06), Max: 98.9 (17 Oct 2020 20:49)  HR: 79 (18 Oct 2020 04:06) (68 - 92)  BP: 100/66 (18 Oct 2020 04:06) (96/58 - 106/70)  BP(mean): --  RR: 18 (18 Oct 2020 04:06) (18 - 20)  SpO2: 98% (18 Oct 2020 04:06) (98% - 99%)    PHYSICAL EXAM:  GENERAL: NAD, Laying in bed, + Cachectic  HEENT: NC/AT, Slightly dry mucous membranes  NECK: Supple  CHEST/LUNG: Grossly CTAB; No wheeze appreciated  HEART: RRR; +S1/S2  ABDOMEN: +BS, Soft, Mild RLQ TTP, No rigidity  EXTREMITIES: No LE edema  NEUROLOGY: Awake and alert, Answering questions and following commands appropriately  SKIN: Warm and dry  PSYCH: Calm and cooperative    LABS:                        8.5    40.06 )-----------( 652      ( 18 Oct 2020 07:04 )             28.9     10-18    137  |  98  |  13  ----------------------------<  78  3.4<L>   |  25  |  0.58    Ca    9.5      18 Oct 2020 07:04  Phos  4.8     1018  Mg     2.1     10-18    TPro  7.1  /  Alb  2.7<L>  /  TBili  0.3  /  DBili  x   /  AST  31  /  ALT  52<H>  /  AlkPhos  288<H>  1018    PT/INR - ( 17 Oct 2020 06:51 )   PT: 18.7 sec;   INR: 1.59 ratio         PTT - ( 17 Oct 2020 06:51 )  PTT:40.0 sec      Urinalysis Basic - ( 17 Oct 2020 13:02 )    Color: Light Yellow / Appearance: Clear / S.022 / pH: x  Gluc: x / Ketone: Negative  / Bili: Negative / Urobili: <2 mg/dL   Blood: x / Protein: Trace / Nitrite: Negative   Leuk Esterase: Negative / RBC: 1 /HPF / WBC 1 /HPF   Sq Epi: x / Non Sq Epi: 0 /HPF / Bacteria: Negative    RADIOLOGY & ADDITIONAL TESTS:  Studies reviewed.    Peripheral smear reviewed: Slightly hypochromic RBCs, No schistocytes seen, Thrombocytosis, Neutrophilic-predominant leukocytosis, PMNs appears grossly unremarkable 162

## 2024-05-30 NOTE — H&P PST ADULT - NSICDXPASTMEDICALHX_GEN_ALL_CORE_FT
PAST MEDICAL HISTORY:  Borderline diabetes     Coronary artery disease involving native coronary artery of native heart, angina presence unspecified     High cholesterol     History of immunotherapy     Hypothyroid     Osteoarthritis     Prostate cancer     S/P radiation therapy      PAST MEDICAL HISTORY:  Borderline diabetes     Coronary artery disease involving native coronary artery of native heart, angina presence unspecified     High cholesterol     History of immunotherapy     History of thrombocytopenia     Hypothyroid     Osteoarthritis     Prostate cancer     S/P radiation therapy

## 2024-05-31 ENCOUNTER — TRANSCRIPTION ENCOUNTER (OUTPATIENT)
Age: 89
End: 2024-05-31

## 2024-05-31 ENCOUNTER — OUTPATIENT (OUTPATIENT)
Dept: INPATIENT UNIT | Facility: HOSPITAL | Age: 89
LOS: 1 days | End: 2024-05-31
Payer: MEDICARE

## 2024-05-31 VITALS
HEART RATE: 52 BPM | TEMPERATURE: 98 F | DIASTOLIC BLOOD PRESSURE: 67 MMHG | OXYGEN SATURATION: 100 % | RESPIRATION RATE: 18 BRPM | WEIGHT: 162.04 LBS | HEIGHT: 68 IN | SYSTOLIC BLOOD PRESSURE: 147 MMHG

## 2024-05-31 VITALS
SYSTOLIC BLOOD PRESSURE: 125 MMHG | HEART RATE: 56 BPM | OXYGEN SATURATION: 98 % | DIASTOLIC BLOOD PRESSURE: 58 MMHG | RESPIRATION RATE: 16 BRPM

## 2024-05-31 DIAGNOSIS — Z98.1 ARTHRODESIS STATUS: Chronic | ICD-10-CM

## 2024-05-31 DIAGNOSIS — S52.031A DISPLACED FRACTURE OF OLECRANON PROCESS WITH INTRAARTICULAR EXTENSION OF RIGHT ULNA, INITIAL ENCOUNTER FOR CLOSED FRACTURE: ICD-10-CM

## 2024-05-31 DIAGNOSIS — Z95.1 PRESENCE OF AORTOCORONARY BYPASS GRAFT: Chronic | ICD-10-CM

## 2024-05-31 DIAGNOSIS — Z90.89 ACQUIRED ABSENCE OF OTHER ORGANS: Chronic | ICD-10-CM

## 2024-05-31 DIAGNOSIS — Z98.890 OTHER SPECIFIED POSTPROCEDURAL STATES: Chronic | ICD-10-CM

## 2024-05-31 DIAGNOSIS — Z95.5 PRESENCE OF CORONARY ANGIOPLASTY IMPLANT AND GRAFT: Chronic | ICD-10-CM

## 2024-05-31 LAB
HCV AB S/CO SERPL IA: 0.04 S/CO — SIGNIFICANT CHANGE UP (ref 0–0.99)
HCV AB SERPL-IMP: SIGNIFICANT CHANGE UP

## 2024-05-31 PROCEDURE — 80048 BASIC METABOLIC PNL TOTAL CA: CPT

## 2024-05-31 PROCEDURE — C9399: CPT

## 2024-05-31 PROCEDURE — 85027 COMPLETE CBC AUTOMATED: CPT

## 2024-05-31 PROCEDURE — G0463: CPT

## 2024-05-31 PROCEDURE — C1713: CPT

## 2024-05-31 PROCEDURE — 88311 DECALCIFY TISSUE: CPT | Mod: 26

## 2024-05-31 PROCEDURE — 88307 TISSUE EXAM BY PATHOLOGIST: CPT | Mod: 26

## 2024-05-31 PROCEDURE — 76000 FLUOROSCOPY <1 HR PHYS/QHP: CPT

## 2024-05-31 PROCEDURE — 86803 HEPATITIS C AB TEST: CPT

## 2024-05-31 PROCEDURE — 88311 DECALCIFY TISSUE: CPT

## 2024-05-31 PROCEDURE — 24685 OPTX ULNAR FX PROX END W/FIX: CPT | Mod: RT

## 2024-05-31 PROCEDURE — 88307 TISSUE EXAM BY PATHOLOGIST: CPT

## 2024-05-31 DEVICE — IMPLANTABLE DEVICE: Type: IMPLANTABLE DEVICE | Site: RIGHT | Status: FUNCTIONAL

## 2024-05-31 DEVICE — SCREW LKG VA SLF TAP W/ T8 STARDRIVE 2.7X42MM: Type: IMPLANTABLE DEVICE | Site: RIGHT | Status: FUNCTIONAL

## 2024-05-31 DEVICE — K-WIRE DEPUY (SMOOTH) TROCAR POINT 0.62 X 9": Type: IMPLANTABLE DEVICE | Site: RIGHT | Status: FUNCTIONAL

## 2024-05-31 DEVICE — K-WIRE DEPUY (SMOOTH) TROCAR POINT 0.45 X 9": Type: IMPLANTABLE DEVICE | Site: RIGHT | Status: FUNCTIONAL

## 2024-05-31 DEVICE — SCREW LKG VA SLF TAP W/ T8 STARDRIVE 2.7X60MM: Type: IMPLANTABLE DEVICE | Site: RIGHT | Status: FUNCTIONAL

## 2024-05-31 DEVICE — SCREW LKG VA SLF TAP W/ T8 STARDRIVE 2.7X32MM: Type: IMPLANTABLE DEVICE | Site: RIGHT | Status: FUNCTIONAL

## 2024-05-31 DEVICE — SCREW LOKG SLF-TPNG W/ STARDRIVE RECESS 3.5X30MM: Type: IMPLANTABLE DEVICE | Site: RIGHT | Status: FUNCTIONAL

## 2024-05-31 DEVICE — SCREW CORT S-T 3.5X34MM: Type: IMPLANTABLE DEVICE | Site: RIGHT | Status: FUNCTIONAL

## 2024-05-31 RX ORDER — OXYBUTYNIN CHLORIDE 5 MG
1 TABLET ORAL
Qty: 0 | Refills: 0 | DISCHARGE

## 2024-05-31 RX ORDER — ACETAMINOPHEN 500 MG
975 TABLET ORAL EVERY 8 HOURS
Refills: 0 | Status: DISCONTINUED | OUTPATIENT
Start: 2024-05-31 | End: 2024-05-31

## 2024-05-31 RX ORDER — OXYCODONE HYDROCHLORIDE 5 MG/1
1 TABLET ORAL
Qty: 28 | Refills: 0
Start: 2024-05-31 | End: 2024-06-06

## 2024-05-31 RX ORDER — ONDANSETRON 8 MG/1
1 TABLET, FILM COATED ORAL
Qty: 28 | Refills: 0
Start: 2024-05-31 | End: 2024-06-06

## 2024-05-31 RX ORDER — ONDANSETRON 8 MG/1
4 TABLET, FILM COATED ORAL ONCE
Refills: 0 | Status: DISCONTINUED | OUTPATIENT
Start: 2024-05-31 | End: 2024-05-31

## 2024-05-31 RX ORDER — HYDROMORPHONE HYDROCHLORIDE 2 MG/ML
0.25 INJECTION INTRAMUSCULAR; INTRAVENOUS; SUBCUTANEOUS
Refills: 0 | Status: DISCONTINUED | OUTPATIENT
Start: 2024-05-31 | End: 2024-05-31

## 2024-05-31 RX ORDER — DICLOFENAC SODIUM/MISOPROSTOL 50 MG-200
0 TABLET,IMMEDIATE,DELAY RELEASE,BIPHASE ORAL
Qty: 0 | Refills: 0 | DISCHARGE

## 2024-05-31 RX ORDER — TAMSULOSIN HYDROCHLORIDE 0.4 MG/1
1 CAPSULE ORAL
Qty: 0 | Refills: 0 | DISCHARGE

## 2024-05-31 RX ORDER — CEFAZOLIN SODIUM 1 G
2000 VIAL (EA) INJECTION ONCE
Refills: 0 | Status: COMPLETED | OUTPATIENT
Start: 2024-05-31 | End: 2024-05-31

## 2024-05-31 RX ORDER — ACETAMINOPHEN 500 MG
3 TABLET ORAL
Qty: 0 | Refills: 0 | DISCHARGE
Start: 2024-05-31

## 2024-05-31 RX ORDER — DOCUSATE SODIUM 100 MG
1 CAPSULE ORAL
Qty: 14 | Refills: 0
Start: 2024-05-31 | End: 2024-06-06

## 2024-05-31 RX ORDER — SIMVASTATIN 20 MG/1
1 TABLET, FILM COATED ORAL
Qty: 0 | Refills: 0 | DISCHARGE

## 2024-05-31 RX ORDER — CHOLECALCIFEROL (VITAMIN D3) 125 MCG
3000 CAPSULE ORAL
Qty: 0 | Refills: 0 | DISCHARGE

## 2024-05-31 RX ORDER — LEVOTHYROXINE SODIUM 125 MCG
1 TABLET ORAL
Qty: 0 | Refills: 0 | DISCHARGE

## 2024-05-31 RX ORDER — CHONDROITIN SULFATE A SODIUM 100 %
1500 POWDER (GRAM) MISCELLANEOUS
Qty: 0 | Refills: 0 | DISCHARGE

## 2024-05-31 RX ADMIN — Medication 100 MILLIGRAM(S): at 14:23

## 2024-05-31 NOTE — PRE-ANESTHESIA EVALUATION ADULT - NSANTHAIRWAYFT_ENT_ALL_CORE
FROM [facility] Pregnancy And Lactation Text: This medication is Pregnancy Category X and contraindicated in pregnancy and in women who may become pregnant. It is unknown if this medication is excreted in breast milk.

## 2024-05-31 NOTE — ASU DISCHARGE PLAN (ADULT/PEDIATRIC) - MODE OF TRANSPORTATION
Wheelchair/Stroller Post-Care Instructions: Patient instructed to not lie down for 4 hours and limit physical activity for 24 hours.

## 2024-05-31 NOTE — BRIEF OPERATIVE NOTE - NSICDXBRIEFPREOP_GEN_ALL_CORE_FT
PRE-OP DIAGNOSIS:  Fracture of olecranon process, right, closed 31-May-2024 10:40:32  Thomas Ewing

## 2024-05-31 NOTE — PRE-ANESTHESIA EVALUATION ADULT - NSANTHADDINFOFT_GEN_ALL_CORE
Discussed risks and benefits of GA with patient including n/v, sore throat, dental injury, and cardiopulmonary complications.  All questions answered.  Risks and benefits of regionl anesthesia discussed including bleeding, infection, nerve damage.

## 2024-05-31 NOTE — PRE-OP CHECKLIST - HEIGHT IN INCHES
8 [facility] Pregnancy And Lactation Text: There is insufficient data to evaluate drug-associated risk for major birth defects, miscarriage, or other adverse maternal or fetal outcomes.  There is a pregnancy registry that monitors pregnancy outcomes in pregnant persons exposed to the medication during pregnancy.  It is unknown if this medication is excreted in breast milk.  Do not breastfeed during treatment and for about 4 weeks after the last dose.

## 2024-05-31 NOTE — ASU DISCHARGE PLAN (ADULT/PEDIATRIC) - CARE PROVIDER_API CALL
Rodrick Rahman  Orthopaedic Surgery  [address] [place] [address], [address]  [place] [address]  Phone: [phone]  Fax: [phone]  Follow Up Time: 1 week

## 2024-05-31 NOTE — CHART NOTE - NSCHARTNOTEFT_GEN_A_CORE
POC    Seen in PACU    Block/Anesthesia still in effect  ; Denies SOB/CP/N/V;    T(C): 36.7 (05-31-24 @ 13:00)  T(F): 98.1 (05-31-24 @ 13:00)  HR: 59 (05-31-24 @ 13:30)  BP: 135/62 (05-31-24 @ 13:30)  RR: 19 (05-31-24 @ 13:30)  SpO2: 95% (05-31-24 @ 13:30)  Wt(kg): --    Physical Exam  Gen: NAD    UUE:   Aquacel Dressing CDI;  Sling on  Drain yes [ ]  No [x ]  Decreased motor and sensory 2/2 anesthesia / block  (+) swelling w/ ecchymosis noted L hand  digits: warm / well-perfused  cap refill brisk @ 2-3 secs   2 + Radial pulse         A/P: 88y Male s/p ORIF r olecranon    -Pain control; Ice prn; Elevate  -DVT ppx; IS  -NWB in sling/immobilizer  -Dispo planning: d/c home later today      ***See Above  Edy DIAMOND  Orthopedics  B: [phone]  S: [phone]

## 2024-05-31 NOTE — ASU DISCHARGE PLAN (ADULT/PEDIATRIC) - NS MD DC FALL RISK RISK
For information on Fall & Injury Prevention, visit: [email or web address] OR  [email or web address] OR  https://www.cdc.gov/steadi/patient.html

## 2024-05-31 NOTE — ASU DISCHARGE PLAN (ADULT/PEDIATRIC) - ASU DC SPECIAL INSTRUCTIONSFT
1. Keep bandage on for 5 days, until Wednesday. Then you can remove and get it wet. Otherwise cover hand with plastic bag for showering.    2. If you have a splint on, keep it on until you see Dr. Rahman in the office. Do not get the splint wet at all.    3. Elevate hand as much as possible and wiggle fingers as much as you can, as often as you think of it (wiggle 10 times every commercial  if you are watching TV, or reading a book after every 5 pages read). The exception is if you have a splint you will not be able to wiggle the affected digit. Try to wiggle the free ones though.    4. Walk plenty, no sitting around.    5. See Dr. Rahman in the office in about 7 days. Call to schedule. You will have you wound checked then, any sutures will be removed, and your splint (if you have one on) will be changed. 1. Keep bandage on for 5 days, until Wednesday. Then you can remove and get it wet. Otherwise cover hand with plastic bag for showering.    2. If you have a splint on, keep it on until you see Dr. Rahman in the office. Do not get the splint wet at all.    3. Elevate hand as much as possible and wiggle fingers as much as you can, as often as you think of it (wiggle 10 times every commercial  if you are watching TV, or reading a book after every 5 pages read). The exception is if you have a splint you will not be able to wiggle the affected digit. Try to wiggle the free ones though.   3a. ice to affected incision every 4-6 hours x 72 hours     4. Walk plenty, no sitting around.    5. Take extra-strength tylenol every 8 hours x 1 week for better pain control    6. See Dr. Rahman in the office in about 7 days. Call to schedule. You will have you wound checked then, any sutures will be removed, and your splint (if you have one on) will be changed.

## 2024-05-31 NOTE — PATIENT PROFILE ADULT - FALL HARM RISK - RISK INTERVENTIONS

## 2024-06-06 LAB — SURGICAL PATHOLOGY STUDY: SIGNIFICANT CHANGE UP

## 2024-06-25 PROBLEM — M19.90 UNSPECIFIED OSTEOARTHRITIS, UNSPECIFIED SITE: Chronic | Status: ACTIVE | Noted: 2024-05-30

## 2024-06-25 PROBLEM — Z86.2 PERSONAL HISTORY OF DISEASES OF THE BLOOD AND BLOOD-FORMING ORGANS AND CERTAIN DISORDERS INVOLVING THE IMMUNE MECHANISM: Chronic | Status: ACTIVE | Noted: 2024-05-30

## 2024-06-28 ENCOUNTER — APPOINTMENT (OUTPATIENT)
Dept: ORTHOPEDIC SURGERY | Facility: CLINIC | Age: 89
End: 2024-06-28

## 2024-07-15 ENCOUNTER — APPOINTMENT (OUTPATIENT)
Dept: ORTHOPEDIC SURGERY | Facility: CLINIC | Age: 89
End: 2024-07-15

## 2024-09-14 NOTE — PHYSICAL THERAPY INITIAL EVALUATION ADULT - IMPAIRMENTS CONTRIBUTING TO GAIT DEVIATIONS, PT EVAL
impaired balance/impaired motor control/pain/impaired postural control/decreased ROM/decreased strength
Adequate

## 2024-10-16 ENCOUNTER — APPOINTMENT (OUTPATIENT)
Dept: ORTHOPEDIC SURGERY | Facility: CLINIC | Age: 89
End: 2024-10-16
Payer: MEDICARE

## 2024-10-16 VITALS — WEIGHT: 161 LBS | BODY MASS INDEX: 24.4 KG/M2 | HEIGHT: 68 IN

## 2024-10-16 DIAGNOSIS — M43.16 SPONDYLOLISTHESIS, LUMBAR REGION: ICD-10-CM

## 2024-10-16 PROCEDURE — 99214 OFFICE O/P EST MOD 30 MIN: CPT

## 2024-10-16 PROCEDURE — 72100 X-RAY EXAM L-S SPINE 2/3 VWS: CPT

## 2025-01-13 NOTE — PATIENT PROFILE ADULT - FUNCTIONAL ASSESSMENT - DAILY ACTIVITY 4.
4 = No assist / stand by assistance regular rate and rhythm/S1 S2 present/no gallops negative details… regular rate and rhythm/S1 S2 present/no gallops/murmur

## 2025-03-17 NOTE — PATIENT PROFILE ADULT - FUNCTIONAL ASSESSMENT - DAILY ACTIVITY 3.
Pt d/c home ambulatory with friend. Pt given d/c instructions and signed d/c paper. Pt educated on follow up plan and medication usage, pt verbalized understanding of d/c instructions. PT iv removed with bleeding controlled tip intact. PT vs stable. Pt had all belongings at d/c.      4 = No assist / stand by assistance

## 2025-04-07 ENCOUNTER — OFFICE (OUTPATIENT)
Dept: URBAN - METROPOLITAN AREA CLINIC 27 | Facility: CLINIC | Age: 89
Setting detail: OPHTHALMOLOGY
End: 2025-04-07
Payer: MEDICARE

## 2025-04-07 DIAGNOSIS — H25.13: ICD-10-CM

## 2025-04-07 DIAGNOSIS — H35.439: ICD-10-CM

## 2025-04-07 DIAGNOSIS — H35.371: ICD-10-CM

## 2025-04-07 DIAGNOSIS — H43.812: ICD-10-CM

## 2025-04-07 PROBLEM — H50.51 ESOPHORIA: Status: ACTIVE | Noted: 2025-04-07

## 2025-04-07 PROBLEM — D31.40 IRIS NEVUS: Status: ACTIVE | Noted: 2025-04-07

## 2025-04-07 PROCEDURE — 99204 OFFICE O/P NEW MOD 45 MIN: CPT | Performed by: OPHTHALMOLOGY

## 2025-04-07 PROCEDURE — 92134 CPTRZ OPH DX IMG PST SGM RTA: CPT | Performed by: OPHTHALMOLOGY

## 2025-04-07 ASSESSMENT — REFRACTION_CURRENTRX
OD_AXIS: 1
OS_VPRISM_DIRECTION: PROGS
OS_CYLINDER: +6.00
OS_SPHERE: -2.00
OD_SPHERE: -2.50
OS_OVR_VA: 20/
OD_ADD: +3.50
OD_CYLINDER: +5.00
OS_ADD: +3.50
OD_VPRISM_DIRECTION: PROGS
OS_AXIS: 172
OD_OVR_VA: 20/

## 2025-04-07 ASSESSMENT — KERATOMETRY
OS_AXISANGLE_DEGREES: 171
OD_K2POWER_DIOPTERS: 45.25
OS_K1POWER_DIOPTERS: 39.25
OD_AXISANGLE_DEGREES: 6
OS_K2POWER_DIOPTERS: 45.00
METHOD_AUTO_MANUAL: AUTO
OD_K1POWER_DIOPTERS: 40.50

## 2025-04-07 ASSESSMENT — REFRACTION_AUTOREFRACTION
OD_AXIS: 7
OD_CYLINDER: +6.00
OS_CYLINDER: +6.75
OS_AXIS: 174
OS_SPHERE: -2.00
OD_SPHERE: -3.25

## 2025-04-07 ASSESSMENT — CONFRONTATIONAL VISUAL FIELD TEST (CVF)
OD_FINDINGS: FULL
OS_FINDINGS: FULL

## 2025-04-07 ASSESSMENT — VISUAL ACUITY
OS_BCVA: 20/40-2
OD_BCVA: 20/40-1

## 2025-04-07 ASSESSMENT — TONOMETRY
OD_IOP_MMHG: 15
OS_IOP_MMHG: 14

## 2025-08-18 ENCOUNTER — NON-APPOINTMENT (OUTPATIENT)
Age: 89
End: 2025-08-18

## 2025-08-18 ENCOUNTER — APPOINTMENT (OUTPATIENT)
Dept: ORTHOPEDIC SURGERY | Facility: CLINIC | Age: 89
End: 2025-08-18
Payer: MEDICARE

## 2025-08-18 VITALS — HEIGHT: 68 IN | WEIGHT: 162 LBS | BODY MASS INDEX: 24.55 KG/M2

## 2025-08-18 DIAGNOSIS — M43.16 SPONDYLOLISTHESIS, LUMBAR REGION: ICD-10-CM

## 2025-08-18 PROCEDURE — 72100 X-RAY EXAM L-S SPINE 2/3 VWS: CPT

## 2025-08-18 PROCEDURE — 99214 OFFICE O/P EST MOD 30 MIN: CPT

## (undated) DEVICE — WARMING BLANKET LOWER ADULT

## (undated) DEVICE — ELCTR BOVIE PENCIL SMOKE EVACUATION

## (undated) DEVICE — LIJ-KMEDIC KERRISON RONGUER: Type: DURABLE MEDICAL EQUIPMENT

## (undated) DEVICE — POSITIONER FOAM EGG CRATE ULNAR 2PCS (PINK)

## (undated) DEVICE — GOWN TRIMAX LG

## (undated) DEVICE — DRAPE 3/4 SHEET 52X76"

## (undated) DEVICE — FOLEY TRAY 16FR 5CC LF UMETER CLOSED

## (undated) DEVICE — DRAPE C ARM C-ARMOUR

## (undated) DEVICE — DRSG BENZOIN 0.6CC

## (undated) DEVICE — PREP BETADINE KIT

## (undated) DEVICE — NDL HYPO SAFE 21G X 1.5" (GREEN)

## (undated) DEVICE — SOL IRR POUR H2O 1000ML

## (undated) DEVICE — SUT VICRYL 2-0 27" FS-1 UNDYED

## (undated) DEVICE — CANISTER DISPOSABLE THIN WALL 3000CC

## (undated) DEVICE — POSITIONER BLUE BOLSTER

## (undated) DEVICE — DRAPE LAPAROTOMY W VELCRO CORD TABS

## (undated) DEVICE — WOUND IRR SURGIPHOR

## (undated) DEVICE — GLV 7.5 PROTEXIS (BLUE)

## (undated) DEVICE — POSITIONER FOAM HEADREST (PINK)

## (undated) DEVICE — NDL SPINAL 18G X 3.5" (PINK)

## (undated) DEVICE — ELCTR GROUNDING PAD ADULT COVIDIEN

## (undated) DEVICE — GLV 6.5 PROTEXIS (WHITE)

## (undated) DEVICE — SUT MONOCRYL 3-0 18" PS-2 UNDYED

## (undated) DEVICE — SOL IRR POUR H2O 1500ML

## (undated) DEVICE — WARMING BLANKET UPPER ADULT

## (undated) DEVICE — DRAPE INSTRUMENT POUCH 6.75" X 11"

## (undated) DEVICE — GLV 9 DERMAPRENE ULTRA

## (undated) DEVICE — SUT POLYSORB 3-0 30" P-12 UNDYED

## (undated) DEVICE — DRAPE C ARM UNIVERSAL

## (undated) DEVICE — DRAPE SURGICAL #1010

## (undated) DEVICE — MIDAS REX LEGEND LUBRICANT DIFFUSER CARTRIDGE

## (undated) DEVICE — DRSG STERISTRIPS 0.5 X 4"

## (undated) DEVICE — DRAPE MAYO STAND 30"

## (undated) DEVICE — DRILL PILOT SHORT 4.0MM

## (undated) DEVICE — BIPOLAR FORCEP STRYKER STANDARD 8" X 1MM (YELLOW)

## (undated) DEVICE — DRSG TEGADERM 4X4.75"

## (undated) DEVICE — PACK HIP PINNING

## (undated) DEVICE — SOL IRR POUR H2O 250ML

## (undated) DEVICE — GLV 7.5 PROTEXIS (WHITE)

## (undated) DEVICE — DRAPE 3/4 SHEET W REINFORCEMENT 56X77"

## (undated) DEVICE — Device

## (undated) DEVICE — DRSG COMBINE 5X9"

## (undated) DEVICE — STAPLER SKIN VISI-STAT 35 WIDE

## (undated) DEVICE — DRAPE MINOR PROCEDURE

## (undated) DEVICE — SOL IRR POUR NS 0.9% 500ML

## (undated) DEVICE — SLING SHOULDER IMMOBILIZER CLINIC LARGE

## (undated) DEVICE — DRSG ACE BANDAGE 6"

## (undated) DEVICE — POSITIONER STRAP ARMBOARD VELCRO TS-30

## (undated) DEVICE — SPONGE X-RAY 4X8"

## (undated) DEVICE — DRSG WEBRIL 6"

## (undated) DEVICE — ELCTR BIPOLAR CORD J&J 12FT DISP

## (undated) DEVICE — MARKING PEN W RULER

## (undated) DEVICE — TAPE SILK 3"

## (undated) DEVICE — ELCTR PEDICLE SCREW PROBE 3MM BALL 1.8MM X 100MM

## (undated) DEVICE — DRSG CURITY GAUZE SPONGE 4 X 4" 12-PLY

## (undated) DEVICE — DRSG TEGADERM 1.75X1.75"

## (undated) DEVICE — BIPOLAR FORCEP SYMMETRY BAYONET 7" X 1.5MM SMOOTH (SILVER)

## (undated) DEVICE — SUT VICRYL 1 36" CT-1 UNDYED

## (undated) DEVICE — DRILL PILOT LONG 4.0MM

## (undated) DEVICE — SUT MONOCRYL 3-0 27" PS-2 UNDYED

## (undated) DEVICE — SOL IRR POUR NS 0.9% 1500ML

## (undated) DEVICE — ELCTR BOVIE TIP BLADE INSULATED 2.75" EDGE

## (undated) DEVICE — MEDICATION LABELS W MARKER

## (undated) DEVICE — GLV 8.5 PROTEXIS (WHITE)

## (undated) DEVICE — DRAPE TOWEL BLUE 17" X 24"

## (undated) DEVICE — PROTECTOR HEEL / ELBOW FLUFFY

## (undated) DEVICE — FOLEY TRAY 16FR 5CC LTX UMETER CLOSED

## (undated) DEVICE — ELCTR BOVIE PENCIL BLADE 10FT

## (undated) DEVICE — VENODYNE/SCD SLEEVE CALF MEDIUM

## (undated) DEVICE — DRSG WEBRIL 4"

## (undated) DEVICE — SUT POLYSORB 4-0 18" P-13 UNDYED

## (undated) DEVICE — TOURNIQUET CUFF 18" DUAL PORT SINGLE BLADDER LUER LOCK (BLACK)

## (undated) DEVICE — DRAPE TOWEL WHITE 17" X 27"

## (undated) DEVICE — DRSG TELFA 3 X 8

## (undated) DEVICE — DRSG KLING 6"

## (undated) DEVICE — PREP BETADINE SPONGE STICKS

## (undated) DEVICE — DRAIN JACKSON PRATT 3 SPRING RESERVOIR W 10FR PVC DRAIN

## (undated) DEVICE — DRSG ACE BANDAGE 4" NS

## (undated) DEVICE — POSITIONER BOOT CRADLE

## (undated) DEVICE — POSITIONER CUSHION INSERT PRONE VIEW LG

## (undated) DEVICE — SUT SURGIPRO 3-0 18" P-12

## (undated) DEVICE — SUT VICRYL 0 27" OS-6 UNDYED

## (undated) DEVICE — SUT VICRYL 1 36" CTX UNDYED

## (undated) DEVICE — SUCTION YANKAUER NO CONTROL VENT

## (undated) DEVICE — SPONGE DISSECTOR PEANUT

## (undated) DEVICE — SUT BIOSYN 4-0 18" P-13

## (undated) DEVICE — LABELS BLANK W PEN

## (undated) DEVICE — SYR ASEPTO

## (undated) DEVICE — GLV 8 PROTEXIS (WHITE)

## (undated) DEVICE — PREP DURAPREP 26CC

## (undated) DEVICE — DRILL BIT SYNTHES ORTHO QC 2.5X110MM

## (undated) DEVICE — DRILL BIT SYNTHES ORTHO 2.0MM W DEPTH MARK QC 110MM

## (undated) DEVICE — DRSG BIOPATCH DISK W CHG 1" W 4.0MM HOLE

## (undated) DEVICE — DRAPE SPLIT SHEET 77" X 108"

## (undated) DEVICE — PACK NEURO MINOR

## (undated) DEVICE — MIDAS REX LEGEND BALL FLUTED SM BORE 4.0MM X 10CM

## (undated) DEVICE — GLV 9 PROTEXIS (WHITE)

## (undated) DEVICE — BLADE SURGICAL #15 CARBON

## (undated) DEVICE — DRSG STOCKINETTE IMPERVIOUS MED

## (undated) DEVICE — DRSG DERMABOND 0.7ML

## (undated) DEVICE — VENODYNE/SCD SLEEVE CALF LARGE

## (undated) DEVICE — CONN 5 IN 1 FOR SUCTION TUBING

## (undated) DEVICE — GOWN XL

## (undated) DEVICE — GLV 7 PROTEXIS (WHITE)

## (undated) DEVICE — PACK EXTREMITY

## (undated) DEVICE — DRSG TAPE MICROFOAM 3"